# Patient Record
Sex: MALE | Race: WHITE | NOT HISPANIC OR LATINO | Employment: FULL TIME | URBAN - METROPOLITAN AREA
[De-identification: names, ages, dates, MRNs, and addresses within clinical notes are randomized per-mention and may not be internally consistent; named-entity substitution may affect disease eponyms.]

---

## 2017-01-12 ENCOUNTER — MYC REFILL (OUTPATIENT)
Dept: FAMILY MEDICINE | Facility: CLINIC | Age: 57
End: 2017-01-12

## 2017-01-12 DIAGNOSIS — I10 BENIGN ESSENTIAL HYPERTENSION: ICD-10-CM

## 2017-01-12 DIAGNOSIS — E78.5 HYPERLIPIDEMIA LDL GOAL <160: ICD-10-CM

## 2017-01-12 RX ORDER — METOPROLOL SUCCINATE 25 MG/1
25 TABLET, EXTENDED RELEASE ORAL DAILY
Qty: 90 TABLET | Refills: 1 | Status: SHIPPED | OUTPATIENT
Start: 2017-01-12 | End: 2017-04-19

## 2017-01-12 RX ORDER — SIMVASTATIN 40 MG
40 TABLET ORAL AT BEDTIME
Qty: 90 TABLET | Refills: 1 | Status: SHIPPED | OUTPATIENT
Start: 2017-01-12 | End: 2017-04-19

## 2017-01-12 NOTE — TELEPHONE ENCOUNTER
We talked with patient's spouse, she too uses same pharmacy and needed and Rx so we verified pharmacy.   Rx's sent. Pt will scheduled fasting OV on or shortly after after 3/3/17.    Óscar Otto RN

## 2017-01-12 NOTE — TELEPHONE ENCOUNTER
Message from MyChart:  Original authorizing provider: MD Kvein Rolon would like a refill of the following medications:  metoprolol (TOPROL-XL) 25 MG 24 hr tablet [Jody Blackburn MD]  simvastatin (ZOCOR) 40 MG tablet [Jody Blackburn MD]    Preferred pharmacy: Other - [facility] [place] [address]    Comment:  Please send renewal notices to my new rx drug company that started 01- for both medications. [facility] RX BIN: [id number] RX PCN: [facility]. [address] [place] [address] Thank you, Kevin Simeon

## 2017-04-19 ENCOUNTER — OFFICE VISIT (OUTPATIENT)
Dept: FAMILY MEDICINE | Facility: CLINIC | Age: 57
End: 2017-04-19
Payer: COMMERCIAL

## 2017-04-19 VITALS
SYSTOLIC BLOOD PRESSURE: 144 MMHG | HEIGHT: 71 IN | DIASTOLIC BLOOD PRESSURE: 84 MMHG | OXYGEN SATURATION: 96 % | BODY MASS INDEX: 25.76 KG/M2 | RESPIRATION RATE: 20 BRPM | TEMPERATURE: 97.4 F | HEART RATE: 90 BPM | WEIGHT: 184 LBS

## 2017-04-19 DIAGNOSIS — I10 BENIGN ESSENTIAL HYPERTENSION: ICD-10-CM

## 2017-04-19 DIAGNOSIS — E78.5 HYPERLIPIDEMIA LDL GOAL <160: ICD-10-CM

## 2017-04-19 DIAGNOSIS — M48.10 DISH (DIFFUSE IDIOPATHIC SKELETAL HYPEROSTOSIS): Primary | ICD-10-CM

## 2017-04-19 PROCEDURE — 99214 OFFICE O/P EST MOD 30 MIN: CPT | Performed by: FAMILY MEDICINE

## 2017-04-19 RX ORDER — METOPROLOL SUCCINATE 50 MG/1
50 TABLET, EXTENDED RELEASE ORAL DAILY
Qty: 90 TABLET | Refills: 1 | Status: SHIPPED | OUTPATIENT
Start: 2017-04-19 | End: 2017-06-06

## 2017-04-19 RX ORDER — SIMVASTATIN 40 MG
40 TABLET ORAL AT BEDTIME
Qty: 90 TABLET | Refills: 3 | Status: SHIPPED | OUTPATIENT
Start: 2017-04-19 | End: 2017-07-16

## 2017-04-19 NOTE — MR AVS SNAPSHOT
"              After Visit Summary   4/19/2017    Kevin Simeon    MRN: [id number]           Patient Information     Date Of Birth          1960        Visit Information        Provider Department      4/19/2017 8:00 AM Jody Blackburn MD [facility]        Today's Diagnoses     DISH (diffuse idiopathic skeletal hyperostosis)    -  1    Benign essential hypertension        Hyperlipidemia LDL goal <160           Follow-ups after your visit        Who to contact     If you have questions or need follow up information about today's clinic visit or your schedule please contact [facility] directly at [phone].  Normal or non-critical lab and imaging results will be communicated to you by [employer]hart, letter or phone within 4 business days after the clinic has received the results. If you do not hear from us within 7 days, please contact the clinic through [employer]hart or phone. If you have a critical or abnormal lab result, we will notify you by phone as soon as possible.  Submit refill requests through [employer] or call your pharmacy and they will forward the refill request to us. Please allow 3 business days for your refill to be completed.          Additional Information About Your Visit        MyChart Information     [employer] gives you secure access to your electronic health record. If you see a primary care provider, you can also send messages to your care team and make appointments. If you have questions, please call your primary care clinic.  If you do not have a primary care provider, please call [phone] and they will assist you.        Care EveryWhere ID     This is your Care EveryWhere ID. This could be used by other organizations to access your [place] medical records  [id number]        Your Vitals Were     Pulse Temperature Respirations Height Pulse Oximetry BMI (Body Mass Index)    90 97.4  F (36.3  C) (Oral) 20 5' 11\" (1.803 m) 96% 25.66 kg/m2       Blood Pressure from Last " 3 Encounters:   04/19/17 144/84   10/10/16 138/78   09/26/16 (!) 138/95    Weight from Last 3 Encounters:   04/19/17 184 lb (83.5 kg)   09/26/16 177 lb 3.2 oz (80.4 kg)   09/02/16 179 lb (81.2 kg)              Today, you had the following     No orders found for display         Today's Medication Changes          These changes are accurate as of: 4/19/17  8:33 AM.  If you have any questions, ask your nurse or doctor.               These medicines have changed or have updated prescriptions.        Dose/Directions    metoprolol 50 MG 24 hr tablet   Commonly known as:  TOPROL-XL   This may have changed:    - medication strength  - how much to take   Used for:  Benign essential hypertension   Changed by:  Jody Blackburn MD        Dose:  50 mg   Take 1 tablet (50 mg) by mouth daily   Quantity:  90 tablet   Refills:  1            Where to get your medicines      These medications were sent to [facility] [address] [place] [address] [address] AT [place] [address] & [facility]  [address], [facility] [address]     Phone:  [phone]     metoprolol 50 MG 24 hr tablet    simvastatin 40 MG tablet                Primary Care Provider Office Phone # Fax #    Jody Blackburn [phone]       [address] [address]        Thank you!     Thank you for choosing [facility]  for your care. Our goal is always to provide you with excellent care. Hearing back from our patients is one way we can continue to improve our services. Please take a few minutes to complete the written survey that you may receive in the mail after your visit with us. Thank you!             Your Updated Medication List - Protect others around you: Learn how to safely use, store and throw away your medicines at www.disposemymeds.org.          This list is accurate as of: 4/19/17  8:33 AM.  Always use your most recent med list.                   Brand Name Dispense  Instructions for use    metoprolol 50 MG 24 hr tablet    TOPROL-XL    90 tablet    Take 1 tablet (50 mg) by mouth daily       simvastatin 40 MG tablet    ZOCOR    90 tablet    Take 1 tablet (40 mg) by mouth At Bedtime

## 2017-04-19 NOTE — PROGRESS NOTES
SUBJECTIVE:                                                    Kevin Simeon is a 56 year old male who presents to clinic today for the following health issues:      Hyperlipidemia Follow-Up      Rate your low fat/cholesterol diet?: fair    Taking statin?  Yes, no muscle aches from statin    Other lipid medications/supplements?:  none     Hypertension Follow-up      Outpatient blood pressures are not being checked.    Low Salt Diet: low salt       Amount of exercise or physical activity: 6-7 days/week for an average of 45-60 minutes    Problems taking medications regularly: No    Medication side effects: none    Diet: regular (no restrictions)      He still complaining of back pain mainly in the Rt side of the mid back. Hx of chronic back pain. Aggravated by certain activities like golfing.    Problem list and histories reviewed & adjusted, as indicated.  Additional history: as documented    Patient Active Problem List   Diagnosis     Bloody diarrhea     CARDIOVASCULAR SCREENING; LDL GOAL LESS THAN 160     Swelling of joint     Inflammatory arthritis     Campylobacter diarrhea     Joint swelling     DISH (diffuse idiopathic skeletal hyperostosis)     Seborrheic keratosis     Benign essential hypertension     No past surgical history on file.    Social History   Substance Use Topics     Smoking status: Never Smoker     Smokeless tobacco: Never Used     Alcohol use 0.0 oz/week     0 Standard drinks or equivalent per week      Comment: 15 beers per week     Family History   Problem Relation Age of Onset     HEART DISEASE Mother      Hypertension Brother          Current Outpatient Prescriptions   Medication Sig Dispense Refill     metoprolol (TOPROL-XL) 50 MG 24 hr tablet Take 1 tablet (50 mg) by mouth daily 90 tablet 1     simvastatin (ZOCOR) 40 MG tablet Take 1 tablet (40 mg) by mouth At Bedtime 90 tablet 3     [DISCONTINUED] metoprolol (TOPROL-XL) 25 MG 24 hr tablet Take 1 tablet (25 mg) by mouth daily 90 tablet 1  "    [DISCONTINUED] simvastatin (ZOCOR) 40 MG tablet Take 1 tablet (40 mg) by mouth At Bedtime 90 tablet 1     Allergies   Allergen Reactions     Penicillins        Reviewed and updated as needed this visit by clinical staff  Tobacco  Allergies  Fam Hx  Soc Hx      Reviewed and updated as needed this visit by Provider         ROS:  C: NEGATIVE for fever, chills, change in weight  R: NEGATIVE for significant cough or SOB  CV: NEGATIVE for chest pain, palpitations or peripheral edema    OBJECTIVE:                                                    /84 (BP Location: Right arm, Patient Position: Chair, Cuff Size: Adult Large)  Pulse 90  Temp 97.4  F (36.3  C) (Oral)  Resp 20  Ht 5' 11\" (1.803 m)  Wt 184 lb (83.5 kg)  SpO2 96%  BMI 25.66 kg/m2  Body mass index is 25.66 kg/(m^2).  GENERAL: healthy, alert and no distress  NECK: no adenopathy, no asymmetry, masses, or scars and thyroid normal to palpation  RESP: lungs clear to auscultation - no rales, rhonchi or wheezes  CV: regular rate and rhythm, normal S1 S2, no S3 or S4, no murmur, click or rub, no peripheral edema and peripheral pulses strong  ABDOMEN: soft, nontender, no hepatosplenomegaly, no masses and bowel sounds normal  MS: no gross musculoskeletal defects noted, no edema         ASSESSMENT/PLAN:                                                            1. Benign essential hypertension  Increase dose to  - metoprolol (TOPROL-XL) 50 MG 24 hr tablet; Take 1 tablet (50 mg) by mouth daily  Dispense: 90 tablet; Refill: 1  Recheck BP in 1 week with nurse only.    2. Hyperlipidemia LDL goal <160  continue  - simvastatin (ZOCOR) 40 MG tablet; Take 1 tablet (40 mg) by mouth At Bedtime  Dispense: 90 tablet; Refill: 3    3. DISH (diffuse idiopathic skeletal hyperostosis)  Pt is still having back pain occasionally, mainly after exercise.        Supportive treatment with tylenol and NSAID's/ massage     Jody Blackburn MD  [facility]"

## 2017-04-19 NOTE — NURSING NOTE
"Chief Complaint   Patient presents with     Recheck Medication     fasting labs       Initial /84 (BP Location: Right arm, Patient Position: Chair, Cuff Size: Adult Large)  Pulse 90  Temp 97.4  F (36.3  C) (Oral)  Resp 20  Ht 5' 11\" (1.803 m)  Wt 184 lb (83.5 kg)  SpO2 96%  BMI 25.66 kg/m2 Estimated body mass index is 25.66 kg/(m^2) as calculated from the following:    Height as of this encounter: 5' 11\" (1.803 m).    Weight as of this encounter: 184 lb (83.5 kg).  Medication Reconciliation: complete   Radha Ortiz, LIYAH      "

## 2017-04-25 PROBLEM — E78.5 HYPERLIPIDEMIA LDL GOAL <130: Status: ACTIVE | Noted: 2017-04-25

## 2017-04-26 ENCOUNTER — ALLIED HEALTH/NURSE VISIT (OUTPATIENT)
Dept: NURSING | Facility: CLINIC | Age: 57
End: 2017-04-26
Payer: COMMERCIAL

## 2017-04-26 VITALS — DIASTOLIC BLOOD PRESSURE: 98 MMHG | SYSTOLIC BLOOD PRESSURE: 160 MMHG

## 2017-04-26 DIAGNOSIS — I10 BENIGN ESSENTIAL HYPERTENSION: Primary | ICD-10-CM

## 2017-04-26 PROCEDURE — 99207 ZZC NO CHARGE NURSE ONLY: CPT

## 2017-04-26 NOTE — MR AVS SNAPSHOT
After Visit Summary   4/26/2017    Kevin Simeon    MRN: [id number]           Patient Information     Date Of Birth          1960        Visit Information        Provider Department      4/26/2017 8:00 AM CR SILVER MA/LPN [facility]        Today's Diagnoses     Benign essential hypertension    -  1       Follow-ups after your visit        Your next 10 appointments already scheduled     Apr 29, 2017  8:30 AM CDT   SHORT with Jody Blackburn MD   [facility]    [address] [place] [address] [phone]              Who to contact     If you have questions or need follow up information about today's clinic visit or your schedule please contact [facility] directly at [phone].  Normal or non-critical lab and imaging results will be communicated to you by MyChart, letter or phone within 4 business days after the clinic has received the results. If you do not hear from us within 7 days, please contact the clinic through MyChart or phone. If you have a critical or abnormal lab result, we will notify you by phone as soon as possible.  Submit refill requests through [employer] or call your pharmacy and they will forward the refill request to us. Please allow 3 business days for your refill to be completed.          Additional Information About Your Visit        MyChart Information     [employer] gives you secure access to your electronic health record. If you see a primary care provider, you can also send messages to your care team and make appointments. If you have questions, please call your primary care clinic.  If you do not have a primary care provider, please call [phone] and they will assist you.        Care EveryWhere ID     This is your Care EveryWhere ID. This could be used by other organizations to access your [place] medical records  [id number]         Blood Pressure from Last 3  Encounters:   04/26/17 (!) 160/98   04/19/17 144/84   10/10/16 138/78    Weight from Last 3 Encounters:   04/19/17 184 lb (83.5 kg)   09/26/16 177 lb 3.2 oz (80.4 kg)   09/02/16 179 lb (81.2 kg)              Today, you had the following     No orders found for display       Primary Care Provider Office Phone # Fax #    Jody Blackburn [phone]       [facility] [address] [facility] [address]        Thank you!     Thank you for choosing [facility]  for your care. Our goal is always to provide you with excellent care. Hearing back from our patients is one way we can continue to improve our services. Please take a few minutes to complete the written survey that you may receive in the mail after your visit with us. Thank you!             Your Updated Medication List - Protect others around you: Learn how to safely use, store and throw away your medicines at www.disposemymeds.org.          This list is accurate as of: 4/26/17  9:01 AM.  Always use your most recent med list.                   Brand Name Dispense Instructions for use    metoprolol 50 MG 24 hr tablet    TOPROL-XL    90 tablet    Take 1 tablet (50 mg) by mouth daily       simvastatin 40 MG tablet    ZOCOR    90 tablet    Take 1 tablet (40 mg) by mouth At Bedtime

## 2017-04-26 NOTE — NURSING NOTE
Per Dr. Blackburn, continue current medication. No change. GI likely viral.  Follow up in one week for nurse only BP check.   Pt informed. He prefers to see PCP. Diarrhea every night a few hours after taking metoprolol. Pt believes this side effect of medication.   Scheduled OV 4/29/17.   Message handled by Nurse Triage with [employer] - provider name: Jody Blackburn MD.  Óscar Otto RN

## 2017-04-26 NOTE — PROGRESS NOTES
"Chief Complaint   Patient presents with     Allied Health Visit     bp       Initial BP (!) 160/98 Estimated body mass index is 25.66 kg/(m^2) as calculated from the following:    Height as of 4/19/17: 5' 11\" (1.803 m).    Weight as of 4/19/17: 184 lb (83.5 kg).  Medication Reconciliation: unable or not appropriate to perform      Kevin Simeon is a 56 year old male who comes in today for a Blood Pressure check because of medication change.    *Document pulse and BP  *Use new set of vitals button for multiple readings.  *Use extended vitals for orthostatic    Vitals as recorded, a regular cuff was used.    Patient is taking medication as prescribed  Patient is not tolerating medications well.  Patient is not monitoring Blood Pressure at home.  Average readings if yes are     Current complaints: diarrhea    Disposition: MD/AP notified while patient in the clinic      Shari Schoenberger, CMA    "

## 2017-04-28 NOTE — PROGRESS NOTES
SUBJECTIVE:                                                    Kevin Simeon is a 56 year old male who presents to clinic today for the following health issues:      Hypertension Follow-up      Outpatient blood pressures are not being checked.    Low Salt Diet: low salt       Amount of exercise or physical activity: 6-7 days/week for an average of 45-60 minutes    Problems taking medications regularly: No    Medication side effects: none    Diet: regular (no restrictions)      When he increased the dose to 50 mg, he had diarrhea, multiple nights, last night his symptoms got better.   Denies any fever, admits to gas .    Problem list and histories reviewed & adjusted, as indicated.  Additional history: as documented    Patient Active Problem List   Diagnosis     Bloody diarrhea     CARDIOVASCULAR SCREENING; LDL GOAL LESS THAN 160     Swelling of joint     Inflammatory arthritis     Campylobacter diarrhea     Joint swelling     DISH (diffuse idiopathic skeletal hyperostosis)     Seborrheic keratosis     Benign essential hypertension     Hyperlipidemia LDL goal <130     No past surgical history on file.    Social History   Substance Use Topics     Smoking status: Never Smoker     Smokeless tobacco: Never Used     Alcohol use 0.0 oz/week     0 Standard drinks or equivalent per week      Comment: 15 beers per week     Family History   Problem Relation Age of Onset     HEART DISEASE Mother      Hypertension Brother          Current Outpatient Prescriptions   Medication Sig Dispense Refill     metoprolol (TOPROL-XL) 50 MG 24 hr tablet Take 1 tablet (50 mg) by mouth daily 90 tablet 1     simvastatin (ZOCOR) 40 MG tablet Take 1 tablet (40 mg) by mouth At Bedtime 90 tablet 3     Allergies   Allergen Reactions     Penicillins        Reviewed and updated as needed this visit by clinical staff  Tobacco  Allergies  Fam Hx       Reviewed and updated as needed this visit by Provider         ROS:  C: NEGATIVE for fever, chills,  "change in weight  R: NEGATIVE for significant cough or SOB  CV: NEGATIVE for chest pain, palpitations or peripheral edema    OBJECTIVE:                                                    /74 (BP Location: Right arm, Patient Position: Chair, Cuff Size: Adult Large)  Pulse 89  Temp 97.8  F (36.6  C) (Oral)  Resp 20  Ht 5' 11\" (1.803 m)  Wt 181 lb (82.1 kg)  SpO2 98%  BMI 25.24 kg/m2  Body mass index is 25.24 kg/(m^2).  GENERAL: healthy, alert and no distress  NECK: no adenopathy, no asymmetry, masses, or scars and thyroid normal to palpation  RESP: lungs clear to auscultation - no rales, rhonchi or wheezes  CV: regular rate and rhythm, normal S1 S2, no S3 or S4, no murmur, click or rub, no peripheral edema and peripheral pulses strong  ABDOMEN: soft, nontender, no hepatosplenomegaly, no masses and bowel sounds normal  MS: no gross musculoskeletal defects noted, no edema         ASSESSMENT/PLAN:                                                            1. Benign essential hypertension  Controlled, continue on same dose.     2. Diarrhea, unspecified type  Mostly related to medication, symptoms has resolved, will continue monitoroing, Follow up in 5 days if symptoms persist, sooner if symptoms worsen or new ones develops, pt may contact us over the phone for any questions or concerns.            Jody Blackburn MD  [facility]"

## 2017-04-29 ENCOUNTER — OFFICE VISIT (OUTPATIENT)
Dept: FAMILY MEDICINE | Facility: CLINIC | Age: 57
End: 2017-04-29
Payer: COMMERCIAL

## 2017-04-29 VITALS
TEMPERATURE: 97.8 F | HEART RATE: 89 BPM | HEIGHT: 71 IN | DIASTOLIC BLOOD PRESSURE: 74 MMHG | WEIGHT: 181 LBS | OXYGEN SATURATION: 98 % | RESPIRATION RATE: 20 BRPM | BODY MASS INDEX: 25.34 KG/M2 | SYSTOLIC BLOOD PRESSURE: 124 MMHG

## 2017-04-29 DIAGNOSIS — R19.7 DIARRHEA, UNSPECIFIED TYPE: ICD-10-CM

## 2017-04-29 DIAGNOSIS — I10 BENIGN ESSENTIAL HYPERTENSION: Primary | ICD-10-CM

## 2017-04-29 PROCEDURE — 99214 OFFICE O/P EST MOD 30 MIN: CPT | Performed by: FAMILY MEDICINE

## 2017-04-29 NOTE — MR AVS SNAPSHOT
"              After Visit Summary   4/29/2017    Kevin Simeon    MRN: [id number]           Patient Information     Date Of Birth          1960        Visit Information        Provider Department      4/29/2017 8:30 AM Jody Blackburn MD [facility]        Today's Diagnoses     Benign essential hypertension    -  1    Diarrhea, unspecified type           Follow-ups after your visit        Who to contact     If you have questions or need follow up information about today's clinic visit or your schedule please contact [facility] directly at [phone].  Normal or non-critical lab and imaging results will be communicated to you by [employer], letter or phone within 4 business days after the clinic has received the results. If you do not hear from us within 7 days, please contact the clinic through [employer] or phone. If you have a critical or abnormal lab result, we will notify you by phone as soon as possible.  Submit refill requests through [employer] or call your pharmacy and they will forward the refill request to us. Please allow 3 business days for your refill to be completed.          Additional Information About Your Visit        MyChart Information     [employer] gives you secure access to your electronic health record. If you see a primary care provider, you can also send messages to your care team and make appointments. If you have questions, please call your primary care clinic.  If you do not have a primary care provider, please call [phone] and they will assist you.        Care EveryWhere ID     This is your Care EveryWhere ID. This could be used by other organizations to access your [place] medical records  [id number]        Your Vitals Were     Pulse Temperature Respirations Height Pulse Oximetry BMI (Body Mass Index)    89 97.8  F (36.6  C) (Oral) 20 5' 11\" (1.803 m) 98% 25.24 kg/m2       Blood Pressure from Last 3 Encounters:   04/29/17 124/74   04/26/17 (!) 160/98 "   04/19/17 144/84    Weight from Last 3 Encounters:   04/29/17 181 lb (82.1 kg)   04/19/17 184 lb (83.5 kg)   09/26/16 177 lb 3.2 oz (80.4 kg)              Today, you had the following     No orders found for display       Primary Care Provider Office Phone # Fax #    Jody Blackburn [phone]       [facility] [id number] [place] [id number]        Thank you!     Thank you for choosing [facility]  for your care. Our goal is always to provide you with excellent care. Hearing back from our patients is one way we can continue to improve our services. Please take a few minutes to complete the written survey that you may receive in the mail after your visit with us. Thank you!             Your Updated Medication List - Protect others around you: Learn how to safely use, store and throw away your medicines at www.disposemymeds.org.          This list is accurate as of: 4/29/17  8:56 AM.  Always use your most recent med list.                   Brand Name Dispense Instructions for use    metoprolol 50 MG 24 hr tablet    TOPROL-XL    90 tablet    Take 1 tablet (50 mg) by mouth daily       simvastatin 40 MG tablet    ZOCOR    90 tablet    Take 1 tablet (40 mg) by mouth At Bedtime

## 2017-04-29 NOTE — NURSING NOTE
"Chief Complaint   Patient presents with     Hypertension       Initial /74 (BP Location: Right arm, Patient Position: Chair, Cuff Size: Adult Large)  Pulse 89  Temp 97.8  F (36.6  C) (Oral)  Resp 20  Ht 5' 11\" (1.803 m)  Wt 181 lb (82.1 kg)  SpO2 98%  BMI 25.24 kg/m2 Estimated body mass index is 25.24 kg/(m^2) as calculated from the following:    Height as of this encounter: 5' 11\" (1.803 m).    Weight as of this encounter: 181 lb (82.1 kg).  Medication Reconciliation: complete   Radha Ortiz, LIYAH      "

## 2017-06-06 DIAGNOSIS — I10 BENIGN ESSENTIAL HYPERTENSION: ICD-10-CM

## 2017-06-06 NOTE — TELEPHONE ENCOUNTER
metoprolol (TOPROL-XL) 50 MG 24 hr tablet     Last Written Prescription Date: 04/19/17  Last Fill Quantity: 90, # refills: 0    Last Office Visit with [facility], [facility] or [facility] prescribing provider:  04/29/17     Future Office Visit:        BP Readings from Last 3 Encounters:   04/29/17 124/74   04/26/17 (!) 160/98   04/19/17 144/84

## 2017-06-09 RX ORDER — METOPROLOL SUCCINATE 50 MG/1
50 TABLET, EXTENDED RELEASE ORAL DAILY
Qty: 90 TABLET | Refills: 2 | Status: SHIPPED | OUTPATIENT
Start: 2017-06-09 | End: 2017-11-15

## 2017-06-09 NOTE — TELEPHONE ENCOUNTER
Prescription approved per [facility] Refill Protocol.   We called Kevin to verify this mail order pharmacy is correct, this writer not familiar with pharmacy.   Verified.   Óscar Otto RN

## 2017-07-16 ENCOUNTER — MYC REFILL (OUTPATIENT)
Dept: FAMILY MEDICINE | Facility: CLINIC | Age: 57
End: 2017-07-16

## 2017-07-16 DIAGNOSIS — E78.5 HYPERLIPIDEMIA LDL GOAL <160: ICD-10-CM

## 2017-07-17 RX ORDER — SIMVASTATIN 40 MG
40 TABLET ORAL AT BEDTIME
Qty: 90 TABLET | Refills: 2 | Status: SHIPPED | OUTPATIENT
Start: 2017-07-17 | End: 2018-03-29

## 2017-07-17 NOTE — TELEPHONE ENCOUNTER
Message from [employer]:  Original authorizing provider: MD Kevin Rolon would like a refill of the following medications:  simvastatin (ZOCOR) 40 MG tablet [Jody Blackburn MD]    [facility] [address]    Comment:

## 2017-09-14 ENCOUNTER — OFFICE VISIT (OUTPATIENT)
Dept: FAMILY MEDICINE | Facility: CLINIC | Age: 57
End: 2017-09-14
Payer: COMMERCIAL

## 2017-09-14 VITALS
SYSTOLIC BLOOD PRESSURE: 138 MMHG | DIASTOLIC BLOOD PRESSURE: 86 MMHG | RESPIRATION RATE: 12 BRPM | WEIGHT: 183 LBS | HEART RATE: 104 BPM | BODY MASS INDEX: 25.52 KG/M2 | TEMPERATURE: 98.4 F

## 2017-09-14 DIAGNOSIS — Z23 NEED FOR PROPHYLACTIC VACCINATION AND INOCULATION AGAINST INFLUENZA: ICD-10-CM

## 2017-09-14 DIAGNOSIS — M25.512 ACUTE PAIN OF LEFT SHOULDER: Primary | ICD-10-CM

## 2017-09-14 PROCEDURE — 90686 IIV4 VACC NO PRSV 0.5 ML IM: CPT | Performed by: FAMILY MEDICINE

## 2017-09-14 PROCEDURE — 90471 IMMUNIZATION ADMIN: CPT | Performed by: FAMILY MEDICINE

## 2017-09-14 PROCEDURE — 99214 OFFICE O/P EST MOD 30 MIN: CPT | Mod: 25 | Performed by: FAMILY MEDICINE

## 2017-09-14 PROCEDURE — 93000 ELECTROCARDIOGRAM COMPLETE: CPT | Performed by: FAMILY MEDICINE

## 2017-09-14 NOTE — MR AVS SNAPSHOT
After Visit Summary   9/14/2017    Kevin Simeon    MRN: [id number]           Patient Information     Date Of Birth          1960        Visit Information        Provider Department      9/14/2017 11:30 AM Jody Blackburn MD [facility]        Today's Diagnoses     Acute pain of left shoulder    -  1       Follow-ups after your visit        Future tests that were ordered for you today     Open Future Orders        Priority Expected Expires Ordered    Exercise Stress Echocardiogram Routine  9/14/2018 9/14/2017            Who to contact     If you have questions or need follow up information about today's clinic visit or your schedule please contact [facility] directly at [phone].  Normal or non-critical lab and imaging results will be communicated to you by MyChart, letter or phone within 4 business days after the clinic has received the results. If you do not hear from us within 7 days, please contact the clinic through [employer] or phone. If you have a critical or abnormal lab result, we will notify you by phone as soon as possible.  Submit refill requests through [employer] or call your pharmacy and they will forward the refill request to us. Please allow 3 business days for your refill to be completed.          Additional Information About Your Visit        MyChart Information     [employer] gives you secure access to your electronic health record. If you see a primary care provider, you can also send messages to your care team and make appointments. If you have questions, please call your primary care clinic.  If you do not have a primary care provider, please call [phone] and they will assist you.        Care EveryWhere ID     This is your Care EveryWhere ID. This could be used by other organizations to access your [facility] medical records  [id number]        Your Vitals Were     Pulse Temperature Respirations BMI (Body Mass Index)          104 98.4  F (36.9   C) 12 25.52 kg/m2         Blood Pressure from Last 3 Encounters:   09/14/17 138/86   04/29/17 124/74   04/26/17 (!) 160/98    Weight from Last 3 Encounters:   09/14/17 183 lb (83 kg)   04/29/17 181 lb (82.1 kg)   04/19/17 184 lb (83.5 kg)              We Performed the Following     EKG 12-lead complete w/read - Clinics        Primary Care Provider Office Phone # Fax #    Jody Blackburn [phone] [address] [facility] [address]        Equal Access to Services     [facility]: Hadii aad faina hadasho [employer], waaxda luqadaha, qaybta kaalmada adeabelyada, carlos persaud . So [facility] [phone].    ATENCIÓN: Si habla español, tiene a evans disposición servicios gratuitos de asistencia lingüística. Llame al [phone].    We comply with applicable federal civil rights laws and [place] laws. We do not discriminate on the basis of race, color, national origin, age, disability sex, sexual orientation or gender identity.            Thank you!     Thank you for choosing [facility]  for your care. Our goal is always to provide you with excellent care. Hearing back from our patients is one way we can continue to improve our services. Please take a few minutes to complete the written survey that you may receive in the mail after your visit with us. Thank you!             Your Updated Medication List - Protect others around you: Learn how to safely use, store and throw away your medicines at www.disposemymeds.org.          This list is accurate as of: 9/14/17 12:18 PM.  Always use your most recent med list.                   Brand Name Dispense Instructions for use Diagnosis    metoprolol 50 MG 24 hr tablet    TOPROL-XL    90 tablet    Take 1 tablet (50 mg) by mouth daily    Benign essential hypertension       simvastatin 40 MG tablet    ZOCOR    90 tablet    Take 1 tablet (40 mg) by mouth At Bedtime    Hyperlipidemia LDL goal <160

## 2017-09-14 NOTE — PROGRESS NOTES
Injectable Influenza Immunization Documentation    1.  Are you sick today? (Fever of 100.5 or higher on the day of the clinic)   No    2.  Have you ever had Guillain-Marion Syndrome within 6 weeks of an influenza vaccionation?  No    3. Do you have a life-threatening allergy to eggs?  No    4. Do you have a life-threatening allergy to a component of the vaccine? May include antibiotics, gelatin or latex.  No     5. Have you ever had a reaction to a dose of flu vaccine that needed immediate medical attention?  No     Form completed by Darci Stevens MA

## 2017-09-14 NOTE — NURSING NOTE
"Chief Complaint   Patient presents with     Shoulder Pain     Left shoulder pain X 7 days, Right shoulder pain as well, but not as bad as left shoulder       Initial /86 (BP Location: Right arm, Patient Position: Chair, Cuff Size: Adult Regular)  Pulse 104  Temp 98.4  F (36.9  C)  Resp 12  Wt 183 lb (83 kg)  BMI 25.52 kg/m2 Estimated body mass index is 25.52 kg/(m^2) as calculated from the following:    Height as of 4/29/17: 5' 11\" (1.803 m).    Weight as of this encounter: 183 lb (83 kg).  Medication Reconciliation: complete   Darci Stevens MA      "

## 2017-09-14 NOTE — PROGRESS NOTES
SUBJECTIVE:   Kevin Simeon is a 56 year old male who presents to clinic today for the following health issues:      Joint Pain    Onset: X 7 days    Description:   Location: left shoulder primarily, but Right shoulder as well  Character: Burning and nagging     Intensity: moderate, severe    Progression of Symptoms: same    Accompanying Signs & Symptoms:  Other symptoms: Left sided cervical pain    History:   Previous similar pain: no       Precipitating factors:   Trauma or overuse: YES- has injured shoulders bilaterally at work for repetitive motion, injured Left shoulder favoring his Right shoulder    Alleviating factors:  Improved by: ice, massage and Ibuprofen    Therapies Tried and outcome: rest seems to help              Problem list and histories reviewed & adjusted, as indicated.  Additional history: as documented    Patient Active Problem List   Diagnosis     Bloody diarrhea     CARDIOVASCULAR SCREENING; LDL GOAL LESS THAN 160     Swelling of joint     Inflammatory arthritis     Campylobacter diarrhea     Joint swelling     DISH (diffuse idiopathic skeletal hyperostosis)     Seborrheic keratosis     Benign essential hypertension     Hyperlipidemia LDL goal <130     History reviewed. No pertinent surgical history.    Social History   Substance Use Topics     Smoking status: Never Smoker     Smokeless tobacco: Never Used     Alcohol use 0.0 oz/week     0 Standard drinks or equivalent per week      Comment: 15 beers per week     Family History   Problem Relation Age of Onset     HEART DISEASE Mother      Hypertension Brother          Current Outpatient Prescriptions   Medication Sig Dispense Refill     simvastatin (ZOCOR) 40 MG tablet Take 1 tablet (40 mg) by mouth At Bedtime 90 tablet 2     metoprolol (TOPROL-XL) 50 MG 24 hr tablet Take 1 tablet (50 mg) by mouth daily 90 tablet 2     Allergies   Allergen Reactions     Penicillins          Reviewed and updated as needed this visit by clinical staffTobacco   Allergies  Med Hx  Surg Hx  Fam Hx  Soc Hx      Reviewed and updated as needed this visit by Provider         ROS:  C: NEGATIVE for fever, chills, change in weight   R: NEGATIVE for significant cough or SOB  CV: NEGATIVE for chest pain, palpitations or peripheral edema    OBJECTIVE:     /86 (BP Location: Right arm, Patient Position: Chair, Cuff Size: Adult Regular)  Pulse 104  Temp 98.4  F (36.9  C)  Resp 12  Wt 183 lb (83 kg)  BMI 25.52 kg/m2  Body mass index is 25.52 kg/(m^2).  GENERAL: healthy, alert and no distress  RESP: lungs clear to auscultation - no rales, rhonchi or wheezes  CV: regular rate and rhythm, normal S1 S2, no S3 or S4, no murmur, click or rub, no peripheral edema and peripheral pulses strong  MS: no gross musculoskeletal defects noted, no edema  Shoulder exam:inspection: normal.  Palpation : mild anterior joint tenderness.  ROM showed :Abduction:nl  Anterior rotation:nl  Internal rotation:nl   Rotator cuff/supraspinatous strength normal  Drop arm sign negative  Impingement test : Rony Crawford-  Instability test : sulcus sign (more than Half inch) -, apprehension,release sign :-        ASSESSMENT/PLAN:             1. Acute pain of left shoulder  Mostly musculoskeletal, pt is very worried about heart disease causing these symptoms, although unlikely, will check for EKG and stress echo  - EKG 12-lead complete w/read - Clinics  - Exercise Stress Echocardiogram; Future  Advised about rest, heat, and Motrin as needed, Follow up in 14 days if symptoms persist, sooner if symptoms worsen or new ones develops, pt may contact us over the phone for any questions or concerns.      2. Need for prophylactic vaccination and inoculation against influenza    - FLU VAC, SPLIT VIRUS IM > 3 YO (QUADRIVALENT) [87533]  - Vaccine Administration, Initial [33995]        Jody Blackburn MD  [facility]

## 2017-09-26 ENCOUNTER — TELEPHONE (OUTPATIENT)
Dept: FAMILY MEDICINE | Facility: CLINIC | Age: 57
End: 2017-09-26

## 2017-09-26 ENCOUNTER — HOSPITAL ENCOUNTER (OUTPATIENT)
Dept: CARDIOLOGY | Facility: CLINIC | Age: 57
Discharge: HOME | End: 2017-09-26
Attending: FAMILY MEDICINE | Admitting: FAMILY MEDICINE
Payer: COMMERCIAL

## 2017-09-26 DIAGNOSIS — M25.512 ACUTE PAIN OF LEFT SHOULDER: ICD-10-CM

## 2017-09-26 DIAGNOSIS — R94.39 ABNORMAL CARDIOVASCULAR STRESS TEST: Primary | ICD-10-CM

## 2017-09-26 PROCEDURE — 93321 DOPPLER ECHO F-UP/LMTD STD: CPT | Mod: 26 | Performed by: INTERNAL MEDICINE

## 2017-09-26 PROCEDURE — 93018 CV STRESS TEST I&R ONLY: CPT | Performed by: INTERNAL MEDICINE

## 2017-09-26 PROCEDURE — 93325 DOPPLER ECHO COLOR FLOW MAPG: CPT | Mod: 26 | Performed by: INTERNAL MEDICINE

## 2017-09-26 PROCEDURE — 93016 CV STRESS TEST SUPVJ ONLY: CPT | Performed by: INTERNAL MEDICINE

## 2017-09-26 PROCEDURE — 93350 STRESS TTE ONLY: CPT | Mod: TC

## 2017-09-26 PROCEDURE — 93350 STRESS TTE ONLY: CPT | Mod: 26 | Performed by: INTERNAL MEDICINE

## 2017-09-26 NOTE — TELEPHONE ENCOUNTER
Pt returned call, explained testing and referral  Will set up appt for further evaluation    Britney Delgado RN, BS  Clinical Nurse Triage.

## 2017-09-26 NOTE — TELEPHONE ENCOUNTER
Please call Kevin, there is some abnormality on the stress test done today, I do recommend that he sees Cardiology about it, I will put the order in and they should be calling him today.  Jody Blackburn MD  [facility]  [phone]

## 2017-09-29 ENCOUNTER — OFFICE VISIT (OUTPATIENT)
Dept: CARDIOLOGY | Facility: CLINIC | Age: 57
End: 2017-09-29
Attending: FAMILY MEDICINE
Payer: COMMERCIAL

## 2017-09-29 VITALS
SYSTOLIC BLOOD PRESSURE: 170 MMHG | DIASTOLIC BLOOD PRESSURE: 98 MMHG | OXYGEN SATURATION: 98 % | HEART RATE: 99 BPM | BODY MASS INDEX: 26.1 KG/M2 | HEIGHT: 71 IN | WEIGHT: 186.4 LBS

## 2017-09-29 DIAGNOSIS — I25.10 CORONARY ARTERY DISEASE INVOLVING NATIVE CORONARY ARTERY OF NATIVE HEART, ANGINA PRESENCE UNSPECIFIED: ICD-10-CM

## 2017-09-29 DIAGNOSIS — R94.39 ABNORMAL CARDIOVASCULAR STRESS TEST: Primary | ICD-10-CM

## 2017-09-29 DIAGNOSIS — I10 BENIGN ESSENTIAL HYPERTENSION: ICD-10-CM

## 2017-09-29 DIAGNOSIS — E78.2 MIXED HYPERLIPIDEMIA: ICD-10-CM

## 2017-09-29 PROCEDURE — 99204 OFFICE O/P NEW MOD 45 MIN: CPT | Performed by: INTERNAL MEDICINE

## 2017-09-29 RX ORDER — LISINOPRIL 10 MG/1
10 TABLET ORAL DAILY
Qty: 90 TABLET | Refills: 3 | Status: SHIPPED | OUTPATIENT
Start: 2017-09-29 | End: 2018-10-15

## 2017-09-29 RX ORDER — ASPIRIN 81 MG/1
81 TABLET ORAL DAILY
Qty: 1 TABLET | Refills: 0 | COMMUNITY
Start: 2017-09-29 | End: 2017-10-24

## 2017-09-29 NOTE — PROGRESS NOTES
HPI and Plan:   See dictation:[id number]    No orders of the defined types were placed in this encounter.      Orders Placed This Encounter   Medications     aspirin EC 81 MG EC tablet     Sig: Take 1 tablet (81 mg) by mouth daily     Dispense:  1 tablet     Refill:  0     lisinopril (PRINIVIL/ZESTRIL) 10 MG tablet     Sig: Take 1 tablet (10 mg) by mouth daily     Dispense:  90 tablet     Refill:  3       There are no discontinued medications.      Encounter Diagnoses   Name Primary?     Coronary artery disease involving native coronary artery of native heart, angina presence unspecified Yes     Benign essential hypertension      Abnormal cardiovascular stress test        CURRENT MEDICATIONS:  Current Outpatient Prescriptions   Medication Sig Dispense Refill     aspirin EC 81 MG EC tablet Take 1 tablet (81 mg) by mouth daily 1 tablet 0     lisinopril (PRINIVIL/ZESTRIL) 10 MG tablet Take 1 tablet (10 mg) by mouth daily 90 tablet 3     simvastatin (ZOCOR) 40 MG tablet Take 1 tablet (40 mg) by mouth At Bedtime 90 tablet 2     metoprolol (TOPROL-XL) 50 MG 24 hr tablet Take 1 tablet (50 mg) by mouth daily 90 tablet 2       ALLERGIES     Allergies   Allergen Reactions     Penicillins        PAST MEDICAL HISTORY:  Past Medical History:   Diagnosis Date     Campylobacter diarrhea 3/19/2012     DISH (diffuse idiopathic skeletal hyperostosis) 9/2/2016     Hyperlipidemia LDL goal <130 4/25/2017     Inflammatory arthritis 3/14/2012     Joint swelling 9/12/2013     Seborrheic keratosis 9/2/2016       PAST SURGICAL HISTORY:  History reviewed. No pertinent surgical history.    FAMILY HISTORY:  Family History   Problem Relation Age of Onset     HEART DISEASE Mother      Hypertension Brother        SOCIAL HISTORY:  Social History     Social History     Marital status:      Spouse name: N/A     Number of children: N/A     Years of education: N/A     Social History Main Topics     Smoking status: Never Smoker     Smokeless tobacco:  "Never Used     Alcohol use 0.0 oz/week     0 Standard drinks or equivalent per week      Comment: 15 beers per week     Drug use: No     Sexual activity: Yes     Partners: Female     Other Topics Concern     None     Social History Narrative       Review of Systems:  Skin:  Negative       Eyes:  Positive for glasses reading  ENT:  Positive for nasal congestion    Respiratory:  Negative       Cardiovascular:  Negative      Gastroenterology: Positive for abdominal pain feels pain in stomach area possibly is due to bp medication  Genitourinary:  Negative      Musculoskeletal:  Positive for joint pain;back pain;arthritis has brusitis in L shoulder  has arthritis in spine  Neurologic:  Negative      Psychiatric:  Positive for sleep disturbances wakes up early often  Heme/Lymph/Imm:  Negative      Endocrine:  Negative        Physical Exam:  Vitals: BP (!) 170/98 (BP Location: Right arm, Patient Position: Sitting, Cuff Size: Adult Regular)  Pulse 99  Ht 1.803 m (5' 11\")  Wt 84.6 kg (186 lb 6.4 oz)  SpO2 98%  BMI 26 kg/m2    Constitutional:  cooperative, alert and oriented, well developed, well nourished, in no acute distress        Skin:  warm and dry to the touch, no apparent skin lesions or masses noted        Head:  normocephalic, no masses or lesions        Eyes:  pupils equal and round, conjunctivae and lids unremarkable, sclera white, no xanthalasma, EOMS intact, no nystagmus        ENT:  no pallor or cyanosis, dentition good        Neck:  carotid pulses are full and equal bilaterally, JVP normal, no carotid bruit, no thyromegaly        Chest:  normal breath sounds, clear to auscultation, normal A-P diameter, normal symmetry, normal respiratory excursion, no use of accessory muscles          Cardiac: regular rhythm, normal S1/S2, no S3 or S4, apical impulse not displaced, no murmurs, gallops or rubs tachycardic                Abdomen:  abdomen soft, non-tender, BS normoactive, no mass, no HSM, no bruits    "     Vascular: pulses full and equal, no bruits auscultated                                        Extremities and Back:  no deformities, clubbing, cyanosis, erythema observed;no edema              Neurological:  affect appropriate, oriented to time, person and place;no gross motor deficits              CC  Jody Blackburn MD  [address] [place] [address]

## 2017-09-29 NOTE — MR AVS SNAPSHOT
After Visit Summary   9/29/2017    Kevin Simeon    MRN: [id number]           Patient Information     Date Of Birth          1960        Visit Information        Provider Department      9/29/2017 1:30 PM Noé Tejada MD [facility]        Today's Diagnoses     Abnormal cardiovascular stress test    -  1    Coronary artery disease involving native coronary artery of native heart, angina presence unspecified        Benign essential hypertension        Mixed hyperlipidemia           Follow-ups after your visit        Your next 10 appointments already scheduled     Oct 09, 2017  7:30 AM CDT   LAB with RU LAB   [facility] ([facility])    [address]  [facility] [address] [phone]           Patient must bring picture ID. Patient should be prepared to give a urine specimen  Please do not eat 10-12 hours before your appointment if you are coming in fasting for labs on lipids, cholesterol, or glucose (sugar). Pregnant women should follow their Care Team instructions. Water with medications is okay. Do not drink coffee or other fluids. If you have concerns about taking  your medications, please ask at office or if scheduling via [employer], send a message by clicking on Secure Messaging, Message Your Care Team.            Oct 10, 2017 11:30 AM CDT   (Arrive by 9:30 AM)   Cath 90 Minute with BIBI LECHUGA   [facility] ([facility])    [address]  [facility] [id number] [phone]            Oct 24, 2017 12:30 PM CDT   Return Visit with FABIAN Whitley CNP   [facility] ([facility])    [id number] [place] [address] [address]  [facility] [id number] [phone]              Future tests that were ordered for you today     Open Future Orders        Priority Expected Expires Ordered    Cardiac  "Cath: Coronary Angiography Adult Order Routine 10/6/2017 9/29/2018 9/29/2017            Who to contact     If you have questions or need follow up information about today's clinic visit or your schedule please contact [facility] [place] directly at [phone].  Normal or non-critical lab and imaging results will be communicated to you by MyChart, letter or phone within 4 business days after the clinic has received the results. If you do not hear from us within 7 days, please contact the clinic through [employer] or phone. If you have a critical or abnormal lab result, we will notify you by phone as soon as possible.  Submit refill requests through [employer] or call your pharmacy and they will forward the refill request to us. Please allow 3 business days for your refill to be completed.          Additional Information About Your Visit        MyChart Information     [employer] gives you secure access to your electronic health record. If you see a primary care provider, you can also send messages to your care team and make appointments. If you have questions, please call your primary care clinic.  If you do not have a primary care provider, please call [phone] and they will assist you.        Care EveryWhere ID     This is your Care EveryWhere ID. This could be used by other organizations to access your [facility] medical records  [id number]        Your Vitals Were     Pulse Height Pulse Oximetry BMI (Body Mass Index)          99 1.803 m (5' 11\") 98% 26 kg/m2         Blood Pressure from Last 3 Encounters:   09/29/17 (!) 170/98   09/14/17 138/86   04/29/17 124/74    Weight from Last 3 Encounters:   09/29/17 84.6 kg (186 lb 6.4 oz)   09/14/17 83 kg (183 lb)   04/29/17 82.1 kg (181 lb)                 Today's Medication Changes          These changes are accurate as of: 9/29/17  2:35 PM.  If you have any questions, ask your nurse or doctor.               Start taking these medicines.     "    Dose/Directions    lisinopril 10 MG tablet   Commonly known as:  PRINIVIL/ZESTRIL   Used for:  Benign essential hypertension   Started by:  Noé Tejada MD        Dose:  10 mg   Take 1 tablet (10 mg) by mouth daily   Quantity:  90 tablet   Refills:  3            Where to get your medicines      [facility] [place] [place] - [address]  [address], [facility] [address]    Hours:  24-hours Phone:  [phone]     lisinopril 10 MG tablet                Primary Care Provider Office Phone # Fax #    Jody Blackburn [phone] [address] [facility] [address]        Equal Access to Services     [facility] : Hadii shirin leono [employer], waaxda luqadaha, qaybta kaalmada adeegyada, carlos persaud . So [facility] [phone].    ATENCIÓN: Si habla español, tiene a evans disposición servicios gratuitos de asistencia lingüística. [facility] [phone].    We comply with applicable federal civil rights laws and [place] laws. We do not discriminate on the basis of race, color, national origin, age, disability, sex, sexual orientation, or gender identity.            Thank you!     Thank you for choosing [facility] [place]  for your care. Our goal is always to provide you with excellent care. Hearing back from our patients is one way we can continue to improve our services. Please take a few minutes to complete the written survey that you may receive in the mail after your visit with us. Thank you!             Your Updated Medication List - Protect others around you: Learn how to safely use, store and throw away your medicines at www.disposemymeds.org.          This list is accurate as of: 9/29/17  2:35 PM.  Always use your most recent med list.                   Brand Name Dispense Instructions for use Diagnosis    aspirin EC 81 MG EC tablet     1 tablet     Take 1 tablet (81 mg) by mouth daily    Coronary artery disease involving native coronary artery of native heart, angina presence unspecified       lisinopril 10 MG tablet    PRINIVIL/ZESTRIL    90 tablet    Take 1 tablet (10 mg) by mouth daily    Benign essential hypertension       metoprolol 50 MG 24 hr tablet    TOPROL-XL    90 tablet    Take 1 tablet (50 mg) by mouth daily    Benign essential hypertension       simvastatin 40 MG tablet    ZOCOR    90 tablet    Take 1 tablet (40 mg) by mouth At Bedtime    Hyperlipidemia LDL goal <160

## 2017-09-29 NOTE — LETTER
9/29/2017    Jody Blackburn MD  [address] [place] [address]    RE: Kevin Blanco       Dear Colleague,    PRIMARY CARE PHYSICIAN:  Dr. Jody Blackburn.      I had the pleasure of seeing your patient, Kevin Simeon, at [facility] for evaluation of left shoulder burning and throbbing and an abnormal stress echocardiogram.  This patient states that he has injured both shoulders while working as an .  More recently, the left arm became more tender especially to palpation.  He would have times where this was burning and throbbing and radiating down the arm.  He saw Dr. Blackburn and a stress echocardiogram was ordered.  The stress echocardiogram showed below average functional capacity with target heart rate achieved.  The baseline shoulder aching went away during exercise and he did not have chest pain.  There was abnormal stress EKG with 2 mm ST depression in the inferior and precordial lateral leads.  The resting echo showed no wall motion abnormalities and ejection fraction of 55%-60%.  There was mild lateral wall hypokinesis with exercise but ejection fraction improved to greater than 70%.  An EKG from 09/14/2017 showed heart rate of 91 beats per minute but no ST or T-wave abnormalities.  This was a normal EKG.  This patient has multiple cardiovascular risk factors including a mother with left arm pain which was diagnosed later as an anginal equivalent.  She went on to have bypass surgery at age 47 years old and eventually needed a cardiac transplant.  The patient has a 1-year history of hypertension and hyperlipidemia.  He has been placed on Toprol-XL 50 mg per day along with simvastatin 40 mg at bedtime.  His fasting lipid profile from 09/2016 showed total cholesterol 202, HDL 40,  and triglycerides 224.  The patient has never had a myocardial infarction or stroke.  He generally exercises twice a day walking anywhere from 20-25 minutes on weekdays and 45 minutes on weekends.  He is a  nonsmoker.  He drinks approximately 12-15 beers per week.      PAST MEDICAL HISTORY:  Includes surgery on a right finger with repair.  No other hospitalizations or surgery.      SOCIAL HISTORY:  The patient is  and has 6 stepchildren.  He is an .      PHYSICAL EXAMINATION:  As listed below.     Outpatient Encounter Prescriptions as of 9/29/2017   Medication Sig Dispense Refill     aspirin EC 81 MG EC tablet Take 1 tablet (81 mg) by mouth daily 1 tablet 0     lisinopril (PRINIVIL/ZESTRIL) 10 MG tablet Take 1 tablet (10 mg) by mouth daily 90 tablet 3     simvastatin (ZOCOR) 40 MG tablet Take 1 tablet (40 mg) by mouth At Bedtime 90 tablet 2     metoprolol (TOPROL-XL) 50 MG 24 hr tablet Take 1 tablet (50 mg) by mouth daily 90 tablet 2     No facility-administered encounter medications on file as of 9/29/2017.       ASSESSMENT:   1.  Kevin Simeon is a pleasant 57-year-old male with left shoulder pain at rest and with activities.  Some of this is undoubtedly musculoskeletal.  However, the patient has an abnormal stress echocardiogram with possible lateral wall ischemia.  Given these findings coronary angiography is suggested.  His blood pressure is elevated and perhaps that is adding to possible cardiac ischemia.  We will treat his blood pressure a bit more aggressively.   2.  Hypertension.  The patient's systolic and diastolic blood pressure is elevated.  I have taken the liberty of adding lisinopril 10 mg.  We can also increase his Toprol-XL dose to try to attain a better heart rate control.  We will wait to do so until he has started the lisinopril.   3.  Hyperlipidemia.  I do not know whether the LDL cholesterol was taken while he was on the simvastatin.  This should probably be done at some time in the near future.      PLAN:   1.  Coronary angiogram, possible angioplasty, stenting or bypass surgery.  All of this has been discussed with the patient.  Risk factors including death, heart attack, stroke,  bleeding diathesis including the need for blood transfusion, arrhythmia including the need for cardioversion, dye nephropathy, allergic reaction to the x-ray dye or infection have all been discussed.  We also discussed intracoronary stenting and the risks thereof.  The patient wishes to proceed.   2.  We will treat his blood pressure a bit more aggressively trying to attain blood pressure below 140/90 and heart rate 60s-70s.   3.  We have added aspirin 81 mg daily.   4.  I have added lisinopril 10 mg daily.  We did discuss possible allergic reaction to this including facial swelling or cough.      It is my pleasure to assist in the care of Kevin Simeon.  All his questions were answered to his satisfaction.  Followup will be determined based on the results of the coronary angiogram.     Sincerely,    Noé Tejada MD     [facility]

## 2017-09-29 NOTE — PROGRESS NOTES
PRIMARY CARE PHYSICIAN:  Dr. Jody Blackburn.      HISTORY OF PRESENT ILLNESS:  I had the pleasure of seeing your patient, Kevin Simeon, at [facility] for evaluation of left shoulder burning and throbbing and an abnormal stress echocardiogram.  This patient states that he has injured both shoulders while working as an .  More recently, the left arm became more tender especially to palpation.  He would have times where this was burning and throbbing and radiating down the arm.  He saw Dr. Blackburn and a stress echocardiogram was ordered.  The stress echocardiogram showed below average functional capacity with target heart rate achieved.  The baseline shoulder aching went away during exercise and he did not have chest pain.  There was abnormal stress EKG with 2 mm ST depression in the inferior and precordial lateral leads.  The resting echo showed no wall motion abnormalities and ejection fraction of 55%-60%.  There was mild lateral wall hypokinesis with exercise but ejection fraction improved to greater than 70%.  An EKG from 09/14/2017 showed heart rate of 91 beats per minute but no ST or T-wave abnormalities.  This was a normal EKG.  This patient has multiple cardiovascular risk factors including a mother with left arm pain which was diagnosed later as an anginal equivalent.  She went on to have bypass surgery at age 47 years old and eventually needed a cardiac transplant.  The patient has a 1-year history of hypertension and hyperlipidemia.  He has been placed on Toprol-XL 50 mg per day along with simvastatin 40 mg at bedtime.  His fasting lipid profile from 09/2016 showed total cholesterol 202, HDL 40,  and triglycerides 224.  The patient has never had a myocardial infarction or stroke.  He generally exercises twice a day walking anywhere from 20-25 minutes on weekdays and 45 minutes on weekends.  He is a nonsmoker.  He drinks approximately 12-15 beers per week.      PAST MEDICAL  HISTORY:  Includes surgery on a right finger with repair.  No other hospitalizations or surgery.      SOCIAL HISTORY:  The patient is  and has 6 stepchildren.  He is an .      PHYSICAL EXAMINATION:  As listed below.      ASSESSMENT:   1.  Kevin Simeon is a pleasant 57-year-old male with left shoulder pain at rest and with activities.  Some of this is undoubtedly musculoskeletal.  However, the patient has an abnormal stress echocardiogram with possible lateral wall ischemia.  Given these findings coronary angiography is suggested.  His blood pressure is elevated and perhaps that is adding to possible cardiac ischemia.  We will treat his blood pressure a bit more aggressively.   2.  Hypertension.  The patient's systolic and diastolic blood pressure is elevated.  I have taken the liberty of adding lisinopril 10 mg.  We can also increase his Toprol-XL dose to try to attain a better heart rate control.  We will wait to do so until he has started the lisinopril.   3.  Hyperlipidemia.  I do not know whether the LDL cholesterol was taken while he was on the simvastatin.  This should probably be done at some time in the near future.      PLAN:   1.  Coronary angiogram, possible angioplasty, stenting or bypass surgery.  All of this has been discussed with the patient.  Risk factors including death, heart attack, stroke, bleeding diathesis including the need for blood transfusion, arrhythmia including the need for cardioversion, dye nephropathy, allergic reaction to the x-ray dye or infection have all been discussed.  We also discussed intracoronary stenting and the risks thereof.  The patient wishes to proceed.   2.  We will treat his blood pressure a bit more aggressively trying to attain blood pressure below 140/90 and heart rate 60s-70s.   3.  We have added aspirin 81 mg daily.   4.  I have added lisinopril 10 mg daily.  We did discuss possible allergic reaction to this including facial swelling or cough.       It is my pleasure to assist in the care of Kevin Ferrari.  All his questions were answered to his satisfaction.  Followup will be determined based on the results of the coronary angiogram.      Bárbara Mae MD       cc:   [place] [address]         BÁRBARA MAE MD, [facility]             D: 2017 14:31   T: 2017 16:15   MT: RAEANN      Name:     KEVIN FERRARI   MRN:      [id number]        Account:      [id number]   :      1960           Service Date: 2017      Document: [id number]

## 2017-10-03 DIAGNOSIS — R94.39 ABNORMAL CARDIOVASCULAR STRESS TEST: Primary | ICD-10-CM

## 2017-10-06 ENCOUNTER — CARE COORDINATION (OUTPATIENT)
Dept: CARDIOLOGY | Facility: CLINIC | Age: 57
End: 2017-10-06

## 2017-10-06 RX ORDER — LIDOCAINE 40 MG/G
CREAM TOPICAL
Status: CANCELLED | OUTPATIENT
Start: 2017-10-06

## 2017-10-06 RX ORDER — POTASSIUM CHLORIDE 750 MG/1
20 TABLET, EXTENDED RELEASE ORAL
Status: CANCELLED | OUTPATIENT
Start: 2017-10-06

## 2017-10-06 RX ORDER — ASPIRIN 81 MG/1
81 TABLET ORAL DAILY
Status: CANCELLED | OUTPATIENT
Start: 2017-10-06

## 2017-10-06 RX ORDER — SODIUM CHLORIDE 9 MG/ML
INJECTION, SOLUTION INTRAVENOUS CONTINUOUS
Status: CANCELLED | OUTPATIENT
Start: 2017-10-06

## 2017-10-06 NOTE — PROGRESS NOTES
LEFT HEART CATH PREP INSTRUCTIONS    Patient is scheduled for a left heart cath at [facility], on 10/10/17.  Check in time is at 0930 and procedure time is at 1130.  Patient should not eat or drink after midnight on 10/9/17 .    Patient is not taking a diuretic, insulin, metformin or anticoagulant.   Patient to continue his current dose of Aspirin. Patient can take his medications in the morning with small sips of water.   Patient does not have an allergy to contrast dye.   Patient has someone available to drive him home from the hospital and can stay with him for 24 hours after the procedure.  This is a same day procedure. Patient should pack an overnight bag just in case he may need to stay overnight.  Patient is scheduled to have pre-cath labs drawn 10/9/17 at 0730. Patient is scheduled for a nurse only visit 10/13/17 and f/u visit w/ Tameka Álvarez CNP 10/24/17.  Cath order set entered.      Carroll WARREN  [facility]

## 2017-10-09 ENCOUNTER — TELEPHONE (OUTPATIENT)
Dept: CARDIOLOGY | Facility: CLINIC | Age: 57
End: 2017-10-09

## 2017-10-09 DIAGNOSIS — R94.39 ABNORMAL CARDIOVASCULAR STRESS TEST: ICD-10-CM

## 2017-10-09 LAB
ANION GAP SERPL CALCULATED.3IONS-SCNC: 3 MMOL/L (ref 3–14)
APTT PPP: 25 SEC (ref 22–37)
BUN SERPL-MCNC: 20 MG/DL (ref 7–30)
CALCIUM SERPL-MCNC: 8.8 MG/DL (ref 8.5–10.1)
CHLORIDE SERPL-SCNC: 103 MMOL/L (ref 94–109)
CO2 SERPL-SCNC: 31 MMOL/L (ref 20–32)
CREAT SERPL-MCNC: 0.92 MG/DL (ref 0.66–1.25)
ERYTHROCYTE [DISTWIDTH] IN BLOOD BY AUTOMATED COUNT: 12.1 % (ref 10–15)
GFR SERPL CREATININE-BSD FRML MDRD: 85 ML/MIN/1.7M2
GLUCOSE SERPL-MCNC: 99 MG/DL (ref 70–99)
HCT VFR BLD AUTO: 42.6 % (ref 40–53)
HGB BLD-MCNC: 14.5 G/DL (ref 13.3–17.7)
INR PPP: 1.02 (ref 0.86–1.14)
MCH RBC QN AUTO: 32.1 PG (ref 26.5–33)
MCHC RBC AUTO-ENTMCNC: 34 G/DL (ref 31.5–36.5)
MCV RBC AUTO: 94 FL (ref 78–100)
PLATELET # BLD AUTO: 252 10E9/L (ref 150–450)
POTASSIUM SERPL-SCNC: 4.2 MMOL/L (ref 3.4–5.3)
RBC # BLD AUTO: 4.52 10E12/L (ref 4.4–5.9)
SODIUM SERPL-SCNC: 137 MMOL/L (ref 133–144)
WBC # BLD AUTO: 7 10E9/L (ref 4–11)

## 2017-10-09 PROCEDURE — 85610 PROTHROMBIN TIME: CPT | Performed by: INTERNAL MEDICINE

## 2017-10-09 PROCEDURE — 85730 THROMBOPLASTIN TIME PARTIAL: CPT | Performed by: INTERNAL MEDICINE

## 2017-10-09 PROCEDURE — 36415 COLL VENOUS BLD VENIPUNCTURE: CPT | Performed by: INTERNAL MEDICINE

## 2017-10-09 PROCEDURE — 80048 BASIC METABOLIC PNL TOTAL CA: CPT | Performed by: INTERNAL MEDICINE

## 2017-10-09 PROCEDURE — 85027 COMPLETE CBC AUTOMATED: CPT | Performed by: INTERNAL MEDICINE

## 2017-10-09 NOTE — TELEPHONE ENCOUNTER
Patient is undergoing a coronary angiogram on 10-10-17. Patient will be gone the next week for vacation, patient is going to [place]. I was going to have patient come see me on Friday to check his cath insertion site. Instead, I will have patient call me if he is having any concerns about the healing of his insertion site. He is going to keep his appt with Tameka Álvarez NP on 10-24-17. Patient had no questions regarding his f/u.     Cuco WARREN  [facility]

## 2017-10-10 ENCOUNTER — APPOINTMENT (OUTPATIENT)
Dept: CARDIOLOGY | Facility: CLINIC | Age: 57
End: 2017-10-10
Attending: INTERNAL MEDICINE
Payer: COMMERCIAL

## 2017-10-10 ENCOUNTER — HOSPITAL ENCOUNTER (OUTPATIENT)
Facility: CLINIC | Age: 57
Discharge: HOME | End: 2017-10-10
Attending: INTERNAL MEDICINE | Admitting: INTERNAL MEDICINE
Payer: COMMERCIAL

## 2017-10-10 VITALS
DIASTOLIC BLOOD PRESSURE: 75 MMHG | BODY MASS INDEX: 26.08 KG/M2 | HEART RATE: 86 BPM | OXYGEN SATURATION: 96 % | HEIGHT: 71 IN | WEIGHT: 186.29 LBS | SYSTOLIC BLOOD PRESSURE: 125 MMHG | TEMPERATURE: 98 F

## 2017-10-10 DIAGNOSIS — R94.39 ABNORMAL CARDIOVASCULAR STRESS TEST: ICD-10-CM

## 2017-10-10 DIAGNOSIS — Z98.61 POSTSURGICAL PERCUTANEOUS TRANSLUMINAL CORONARY ANGIOPLASTY STATUS: ICD-10-CM

## 2017-10-10 DIAGNOSIS — Z98.61 STATUS POST CORONARY ANGIOPLASTY: ICD-10-CM

## 2017-10-10 DIAGNOSIS — I25.10 CORONARY ARTERY DISEASE INVOLVING NATIVE HEART, ANGINA PRESENCE UNSPECIFIED, UNSPECIFIED VESSEL OR LESION TYPE: ICD-10-CM

## 2017-10-10 PROBLEM — Z98.890 STATUS POST CORONARY ANGIOGRAM: Status: ACTIVE | Noted: 2017-10-10

## 2017-10-10 LAB
INTERPRETATION ECG - MUSE: NORMAL
INTERPRETATION ECG - MUSE: NORMAL
KCT BLD-ACNC: 303 SEC (ref 105–167)

## 2017-10-10 PROCEDURE — 93454 CORONARY ARTERY ANGIO S&I: CPT | Mod: 26 | Performed by: INTERNAL MEDICINE

## 2017-10-10 PROCEDURE — 93010 ELECTROCARDIOGRAM REPORT: CPT | Mod: 76 | Performed by: INTERNAL MEDICINE

## 2017-10-10 PROCEDURE — 25000125 ZZHC RX 250: Performed by: INTERNAL MEDICINE

## 2017-10-10 PROCEDURE — 93454 CORONARY ARTERY ANGIO S&I: CPT

## 2017-10-10 PROCEDURE — C9600 PERC DRUG-EL COR STENT SING: HCPCS | Mod: LD

## 2017-10-10 PROCEDURE — B2111ZZ FLUOROSCOPY OF MULTIPLE CORONARY ARTERIES USING LOW OSMOLAR CONTRAST: ICD-10-PCS | Performed by: INTERNAL MEDICINE

## 2017-10-10 PROCEDURE — 27210856 ZZH ACCESS HEART CATH CR2

## 2017-10-10 PROCEDURE — 027034Z DILATION OF CORONARY ARTERY, ONE ARTERY WITH DRUG-ELUTING INTRALUMINAL DEVICE, PERCUTANEOUS APPROACH: ICD-10-PCS | Performed by: INTERNAL MEDICINE

## 2017-10-10 PROCEDURE — C1874 STENT, COATED/COV W/DEL SYS: HCPCS

## 2017-10-10 PROCEDURE — 27210827 ZZH KIT ACIST INJECTOR CR6

## 2017-10-10 PROCEDURE — 27210946 ZZH KIT HC TOTES DISP CR8

## 2017-10-10 PROCEDURE — 25000128 H RX IP 250 OP 636: Performed by: INTERNAL MEDICINE

## 2017-10-10 PROCEDURE — C1725 CATH, TRANSLUMIN NON-LASER: HCPCS

## 2017-10-10 PROCEDURE — 99153 MOD SED SAME PHYS/QHP EA: CPT

## 2017-10-10 PROCEDURE — 85347 COAGULATION TIME ACTIVATED: CPT

## 2017-10-10 PROCEDURE — 25000132 ZZH RX MED GY IP 250 OP 250 PS 637: Performed by: INTERNAL MEDICINE

## 2017-10-10 PROCEDURE — C1769 GUIDE WIRE: HCPCS

## 2017-10-10 PROCEDURE — 99152 MOD SED SAME PHYS/QHP 5/>YRS: CPT | Performed by: INTERNAL MEDICINE

## 2017-10-10 PROCEDURE — 92928 PRQ TCAT PLMT NTRAC ST 1 LES: CPT | Mod: LD | Performed by: INTERNAL MEDICINE

## 2017-10-10 PROCEDURE — 27210759 ZZH DEVICE INFLATION CR6

## 2017-10-10 PROCEDURE — C1887 CATHETER, GUIDING: HCPCS

## 2017-10-10 PROCEDURE — 27210742 ZZH CATH CR1

## 2017-10-10 PROCEDURE — 99152 MOD SED SAME PHYS/QHP 5/>YRS: CPT

## 2017-10-10 PROCEDURE — C1894 INTRO/SHEATH, NON-LASER: HCPCS

## 2017-10-10 RX ORDER — ASPIRIN 81 MG/1
81 TABLET ORAL DAILY
Status: DISCONTINUED | OUTPATIENT
Start: 2017-10-10 | End: 2017-10-10 | Stop reason: HOSPADM

## 2017-10-10 RX ORDER — ACETAMINOPHEN 325 MG/1
325-650 TABLET ORAL EVERY 4 HOURS PRN
Status: DISCONTINUED | OUTPATIENT
Start: 2017-10-10 | End: 2017-10-10 | Stop reason: HOSPADM

## 2017-10-10 RX ORDER — ONDANSETRON 2 MG/ML
4 INJECTION INTRAMUSCULAR; INTRAVENOUS EVERY 4 HOURS PRN
Status: DISCONTINUED | OUTPATIENT
Start: 2017-10-10 | End: 2017-10-10 | Stop reason: HOSPADM

## 2017-10-10 RX ORDER — NITROGLYCERIN 20 MG/100ML
.07-2 INJECTION INTRAVENOUS CONTINUOUS PRN
Status: DISCONTINUED | OUTPATIENT
Start: 2017-10-10 | End: 2017-10-10 | Stop reason: HOSPADM

## 2017-10-10 RX ORDER — CLOPIDOGREL 300 MG/1
TABLET, FILM COATED ORAL
Status: DISCONTINUED
Start: 2017-10-10 | End: 2017-10-10 | Stop reason: HOSPADM

## 2017-10-10 RX ORDER — ADENOSINE 3 MG/ML
12-12000 INJECTION, SOLUTION INTRAVENOUS
Status: DISCONTINUED | OUTPATIENT
Start: 2017-10-10 | End: 2017-10-10 | Stop reason: HOSPADM

## 2017-10-10 RX ORDER — ENALAPRILAT 1.25 MG/ML
1.25-2.5 INJECTION INTRAVENOUS
Status: DISCONTINUED | OUTPATIENT
Start: 2017-10-10 | End: 2017-10-10 | Stop reason: HOSPADM

## 2017-10-10 RX ORDER — PROTAMINE SULFATE 10 MG/ML
1-5 INJECTION, SOLUTION INTRAVENOUS
Status: DISCONTINUED | OUTPATIENT
Start: 2017-10-10 | End: 2017-10-10 | Stop reason: HOSPADM

## 2017-10-10 RX ORDER — LIDOCAINE HYDROCHLORIDE 10 MG/ML
1-10 INJECTION, SOLUTION INFILTRATION; PERINEURAL
Status: COMPLETED | OUTPATIENT
Start: 2017-10-10 | End: 2017-10-10

## 2017-10-10 RX ORDER — FENTANYL CITRATE 50 UG/ML
INJECTION, SOLUTION INTRAMUSCULAR; INTRAVENOUS
Status: DISCONTINUED
Start: 2017-10-10 | End: 2017-10-10 | Stop reason: HOSPADM

## 2017-10-10 RX ORDER — CLOPIDOGREL BISULFATE 75 MG/1
75 TABLET ORAL
Status: DISCONTINUED | OUTPATIENT
Start: 2017-10-10 | End: 2017-10-10 | Stop reason: HOSPADM

## 2017-10-10 RX ORDER — FLUMAZENIL 0.1 MG/ML
0.2 INJECTION, SOLUTION INTRAVENOUS
Status: DISCONTINUED | OUTPATIENT
Start: 2017-10-10 | End: 2017-10-10 | Stop reason: HOSPADM

## 2017-10-10 RX ORDER — NALOXONE HYDROCHLORIDE 0.4 MG/ML
0.4 INJECTION, SOLUTION INTRAMUSCULAR; INTRAVENOUS; SUBCUTANEOUS EVERY 5 MIN PRN
Status: DISCONTINUED | OUTPATIENT
Start: 2017-10-10 | End: 2017-10-10 | Stop reason: HOSPADM

## 2017-10-10 RX ORDER — NITROGLYCERIN 0.4 MG/1
0.4 TABLET SUBLINGUAL EVERY 5 MIN PRN
Status: DISCONTINUED | OUTPATIENT
Start: 2017-10-10 | End: 2017-10-10 | Stop reason: HOSPADM

## 2017-10-10 RX ORDER — VERAPAMIL HYDROCHLORIDE 2.5 MG/ML
1-2.5 INJECTION, SOLUTION INTRAVENOUS
Status: COMPLETED | OUTPATIENT
Start: 2017-10-10 | End: 2017-10-10

## 2017-10-10 RX ORDER — FENTANYL CITRATE 50 UG/ML
25-50 INJECTION, SOLUTION INTRAMUSCULAR; INTRAVENOUS
Status: DISCONTINUED | OUTPATIENT
Start: 2017-10-10 | End: 2017-10-10 | Stop reason: HOSPADM

## 2017-10-10 RX ORDER — ATROPINE SULFATE 0.1 MG/ML
0.5 INJECTION INTRAVENOUS EVERY 5 MIN PRN
Status: DISCONTINUED | OUTPATIENT
Start: 2017-10-10 | End: 2017-10-10 | Stop reason: HOSPADM

## 2017-10-10 RX ORDER — LORAZEPAM 2 MG/ML
.5-2 INJECTION INTRAMUSCULAR EVERY 4 HOURS PRN
Status: DISCONTINUED | OUTPATIENT
Start: 2017-10-10 | End: 2017-10-10 | Stop reason: HOSPADM

## 2017-10-10 RX ORDER — NITROGLYCERIN 5 MG/ML
100-200 VIAL (ML) INTRAVENOUS
Status: DISCONTINUED | OUTPATIENT
Start: 2017-10-10 | End: 2017-10-10 | Stop reason: HOSPADM

## 2017-10-10 RX ORDER — NITROGLYCERIN 5 MG/ML
100-500 VIAL (ML) INTRAVENOUS
Status: COMPLETED | OUTPATIENT
Start: 2017-10-10 | End: 2017-10-10

## 2017-10-10 RX ORDER — PROMETHAZINE HYDROCHLORIDE 25 MG/ML
6.25-25 INJECTION, SOLUTION INTRAMUSCULAR; INTRAVENOUS EVERY 4 HOURS PRN
Status: DISCONTINUED | OUTPATIENT
Start: 2017-10-10 | End: 2017-10-10 | Stop reason: HOSPADM

## 2017-10-10 RX ORDER — FUROSEMIDE 10 MG/ML
20-100 INJECTION INTRAMUSCULAR; INTRAVENOUS
Status: DISCONTINUED | OUTPATIENT
Start: 2017-10-10 | End: 2017-10-10 | Stop reason: HOSPADM

## 2017-10-10 RX ORDER — POTASSIUM CHLORIDE 7.45 MG/ML
10 INJECTION INTRAVENOUS
Status: DISCONTINUED | OUTPATIENT
Start: 2017-10-10 | End: 2017-10-10 | Stop reason: HOSPADM

## 2017-10-10 RX ORDER — DIPHENHYDRAMINE HYDROCHLORIDE 50 MG/ML
25-50 INJECTION INTRAMUSCULAR; INTRAVENOUS
Status: DISCONTINUED | OUTPATIENT
Start: 2017-10-10 | End: 2017-10-10 | Stop reason: HOSPADM

## 2017-10-10 RX ORDER — NITROGLYCERIN 5 MG/ML
VIAL (ML) INTRAVENOUS
Status: DISCONTINUED
Start: 2017-10-10 | End: 2017-10-10 | Stop reason: HOSPADM

## 2017-10-10 RX ORDER — DOBUTAMINE HYDROCHLORIDE 200 MG/100ML
2-20 INJECTION INTRAVENOUS CONTINUOUS PRN
Status: DISCONTINUED | OUTPATIENT
Start: 2017-10-10 | End: 2017-10-10 | Stop reason: HOSPADM

## 2017-10-10 RX ORDER — ATROPINE SULFATE 0.1 MG/ML
.5-1 INJECTION INTRAVENOUS
Status: DISCONTINUED | OUTPATIENT
Start: 2017-10-10 | End: 2017-10-10 | Stop reason: HOSPADM

## 2017-10-10 RX ORDER — DOPAMINE HYDROCHLORIDE 160 MG/100ML
2-20 INJECTION, SOLUTION INTRAVENOUS CONTINUOUS PRN
Status: DISCONTINUED | OUTPATIENT
Start: 2017-10-10 | End: 2017-10-10 | Stop reason: HOSPADM

## 2017-10-10 RX ORDER — HEPARIN SODIUM 1000 [USP'U]/ML
INJECTION, SOLUTION INTRAVENOUS; SUBCUTANEOUS
Status: DISCONTINUED
Start: 2017-10-10 | End: 2017-10-10 | Stop reason: HOSPADM

## 2017-10-10 RX ORDER — IOPAMIDOL 755 MG/ML
100 INJECTION, SOLUTION INTRAVASCULAR ONCE
Status: COMPLETED | OUTPATIENT
Start: 2017-10-10 | End: 2017-10-10

## 2017-10-10 RX ORDER — VERAPAMIL HYDROCHLORIDE 2.5 MG/ML
INJECTION, SOLUTION INTRAVENOUS
Status: DISCONTINUED
Start: 2017-10-10 | End: 2017-10-10 | Stop reason: HOSPADM

## 2017-10-10 RX ORDER — MORPHINE SULFATE 4 MG/ML
1-2 INJECTION, SOLUTION INTRAMUSCULAR; INTRAVENOUS EVERY 5 MIN PRN
Status: DISCONTINUED | OUTPATIENT
Start: 2017-10-10 | End: 2017-10-10 | Stop reason: HOSPADM

## 2017-10-10 RX ORDER — ACETAMINOPHEN 325 MG/1
TABLET ORAL
Status: DISCONTINUED
Start: 2017-10-10 | End: 2017-10-10 | Stop reason: HOSPADM

## 2017-10-10 RX ORDER — PHENYLEPHRINE HCL IN 0.9% NACL 1 MG/10 ML
20-100 SYRINGE (ML) INTRAVENOUS
Status: DISCONTINUED | OUTPATIENT
Start: 2017-10-10 | End: 2017-10-10 | Stop reason: HOSPADM

## 2017-10-10 RX ORDER — PRASUGREL 10 MG/1
10-60 TABLET, FILM COATED ORAL
Status: DISCONTINUED | OUTPATIENT
Start: 2017-10-10 | End: 2017-10-10 | Stop reason: HOSPADM

## 2017-10-10 RX ORDER — LIDOCAINE HYDROCHLORIDE 10 MG/ML
30 INJECTION, SOLUTION EPIDURAL; INFILTRATION; INTRACAUDAL; PERINEURAL
Status: DISCONTINUED | OUTPATIENT
Start: 2017-10-10 | End: 2017-10-10 | Stop reason: HOSPADM

## 2017-10-10 RX ORDER — LIDOCAINE 40 MG/G
CREAM TOPICAL
Status: DISCONTINUED | OUTPATIENT
Start: 2017-10-10 | End: 2017-10-10 | Stop reason: HOSPADM

## 2017-10-10 RX ORDER — HYDROCODONE BITARTRATE AND ACETAMINOPHEN 5; 325 MG/1; MG/1
1-2 TABLET ORAL EVERY 4 HOURS PRN
Status: DISCONTINUED | OUTPATIENT
Start: 2017-10-10 | End: 2017-10-10 | Stop reason: HOSPADM

## 2017-10-10 RX ORDER — NICARDIPINE HYDROCHLORIDE 2.5 MG/ML
100 INJECTION INTRAVENOUS
Status: DISCONTINUED | OUTPATIENT
Start: 2017-10-10 | End: 2017-10-10 | Stop reason: HOSPADM

## 2017-10-10 RX ORDER — SODIUM NITROPRUSSIDE 25 MG/ML
100-200 INJECTION INTRAVENOUS
Status: DISCONTINUED | OUTPATIENT
Start: 2017-10-10 | End: 2017-10-10 | Stop reason: HOSPADM

## 2017-10-10 RX ORDER — NIFEDIPINE 10 MG/1
10 CAPSULE ORAL
Status: DISCONTINUED | OUTPATIENT
Start: 2017-10-10 | End: 2017-10-10 | Stop reason: HOSPADM

## 2017-10-10 RX ORDER — POTASSIUM CHLORIDE 1500 MG/1
20 TABLET, EXTENDED RELEASE ORAL
Status: DISCONTINUED | OUTPATIENT
Start: 2017-10-10 | End: 2017-10-10 | Stop reason: HOSPADM

## 2017-10-10 RX ORDER — CLOPIDOGREL BISULFATE 75 MG/1
75 TABLET ORAL ONCE
Status: DISCONTINUED | OUTPATIENT
Start: 2017-10-11 | End: 2017-10-10 | Stop reason: HOSPADM

## 2017-10-10 RX ORDER — CLOPIDOGREL BISULFATE 75 MG/1
300-600 TABLET ORAL
Status: COMPLETED | OUTPATIENT
Start: 2017-10-10 | End: 2017-10-10

## 2017-10-10 RX ORDER — NALOXONE HYDROCHLORIDE 0.4 MG/ML
.1-.4 INJECTION, SOLUTION INTRAMUSCULAR; INTRAVENOUS; SUBCUTANEOUS
Status: DISCONTINUED | OUTPATIENT
Start: 2017-10-10 | End: 2017-10-10 | Stop reason: HOSPADM

## 2017-10-10 RX ORDER — METHYLPREDNISOLONE SODIUM SUCCINATE 125 MG/2ML
125 INJECTION, POWDER, LYOPHILIZED, FOR SOLUTION INTRAMUSCULAR; INTRAVENOUS
Status: DISCONTINUED | OUTPATIENT
Start: 2017-10-10 | End: 2017-10-10 | Stop reason: HOSPADM

## 2017-10-10 RX ORDER — EPINEPHRINE 1 MG/ML
0.3 INJECTION, SOLUTION, CONCENTRATE INTRAVENOUS
Status: DISCONTINUED | OUTPATIENT
Start: 2017-10-10 | End: 2017-10-10 | Stop reason: HOSPADM

## 2017-10-10 RX ORDER — BUPIVACAINE HYDROCHLORIDE 2.5 MG/ML
1-10 INJECTION, SOLUTION EPIDURAL; INFILTRATION; INTRACAUDAL
Status: DISCONTINUED | OUTPATIENT
Start: 2017-10-10 | End: 2017-10-10 | Stop reason: HOSPADM

## 2017-10-10 RX ORDER — CLOPIDOGREL BISULFATE 75 MG/1
75 TABLET ORAL DAILY
Qty: 90 TABLET | Refills: 3 | Status: SHIPPED | OUTPATIENT
Start: 2017-10-11 | End: 2017-10-29

## 2017-10-10 RX ORDER — DEXTROSE MONOHYDRATE 25 G/50ML
12.5-5 INJECTION, SOLUTION INTRAVENOUS EVERY 30 MIN PRN
Status: DISCONTINUED | OUTPATIENT
Start: 2017-10-10 | End: 2017-10-10 | Stop reason: HOSPADM

## 2017-10-10 RX ORDER — ASPIRIN 81 MG/1
81-324 TABLET, CHEWABLE ORAL
Status: DISCONTINUED | OUTPATIENT
Start: 2017-10-10 | End: 2017-10-10 | Stop reason: HOSPADM

## 2017-10-10 RX ORDER — POTASSIUM CHLORIDE 29.8 MG/ML
20 INJECTION INTRAVENOUS
Status: DISCONTINUED | OUTPATIENT
Start: 2017-10-10 | End: 2017-10-10 | Stop reason: HOSPADM

## 2017-10-10 RX ORDER — SODIUM CHLORIDE 9 MG/ML
INJECTION, SOLUTION INTRAVENOUS CONTINUOUS
Status: DISCONTINUED | OUTPATIENT
Start: 2017-10-10 | End: 2017-10-10 | Stop reason: HOSPADM

## 2017-10-10 RX ORDER — ASPIRIN 325 MG
325 TABLET ORAL
Status: DISCONTINUED | OUTPATIENT
Start: 2017-10-10 | End: 2017-10-10 | Stop reason: HOSPADM

## 2017-10-10 RX ORDER — HEPARIN SODIUM 1000 [USP'U]/ML
1000-10000 INJECTION, SOLUTION INTRAVENOUS; SUBCUTANEOUS EVERY 5 MIN PRN
Status: DISCONTINUED | OUTPATIENT
Start: 2017-10-10 | End: 2017-10-10 | Stop reason: HOSPADM

## 2017-10-10 RX ORDER — METOPROLOL TARTRATE 1 MG/ML
5 INJECTION, SOLUTION INTRAVENOUS EVERY 5 MIN PRN
Status: DISCONTINUED | OUTPATIENT
Start: 2017-10-10 | End: 2017-10-10 | Stop reason: HOSPADM

## 2017-10-10 RX ORDER — NALOXONE HYDROCHLORIDE 0.4 MG/ML
.2-.4 INJECTION, SOLUTION INTRAMUSCULAR; INTRAVENOUS; SUBCUTANEOUS
Status: DISCONTINUED | OUTPATIENT
Start: 2017-10-10 | End: 2017-10-10 | Stop reason: HOSPADM

## 2017-10-10 RX ORDER — PROTAMINE SULFATE 10 MG/ML
25-100 INJECTION, SOLUTION INTRAVENOUS EVERY 5 MIN PRN
Status: DISCONTINUED | OUTPATIENT
Start: 2017-10-10 | End: 2017-10-10 | Stop reason: HOSPADM

## 2017-10-10 RX ORDER — HYDRALAZINE HYDROCHLORIDE 20 MG/ML
10-20 INJECTION INTRAMUSCULAR; INTRAVENOUS
Status: DISCONTINUED | OUTPATIENT
Start: 2017-10-10 | End: 2017-10-10 | Stop reason: HOSPADM

## 2017-10-10 RX ADMIN — MIDAZOLAM 1 MG: 1 INJECTION INTRAMUSCULAR; INTRAVENOUS at 11:50

## 2017-10-10 RX ADMIN — NITROGLYCERIN 300 MCG: 5 INJECTION, SOLUTION INTRAVENOUS at 12:18

## 2017-10-10 RX ADMIN — HEPARIN SODIUM 4000 UNITS: 1000 INJECTION, SOLUTION INTRAVENOUS; SUBCUTANEOUS at 11:55

## 2017-10-10 RX ADMIN — ACETAMINOPHEN 650 MG: 325 TABLET, FILM COATED ORAL at 14:38

## 2017-10-10 RX ADMIN — LIDOCAINE HYDROCHLORIDE 1 ML: 10 INJECTION, SOLUTION EPIDURAL; INFILTRATION; INTRACAUDAL; PERINEURAL at 11:52

## 2017-10-10 RX ADMIN — FENTANYL CITRATE 25 MCG: 50 INJECTION INTRAMUSCULAR; INTRAVENOUS at 12:12

## 2017-10-10 RX ADMIN — IOPAMIDOL 214 ML: 755 INJECTION, SOLUTION INTRAVASCULAR at 10:15

## 2017-10-10 RX ADMIN — NITROGLYCERIN 400 MCG: 5 INJECTION, SOLUTION INTRAVENOUS at 11:57

## 2017-10-10 RX ADMIN — FENTANYL CITRATE 50 MCG: 50 INJECTION INTRAMUSCULAR; INTRAVENOUS at 11:50

## 2017-10-10 RX ADMIN — MIDAZOLAM 0.5 MG: 1 INJECTION INTRAMUSCULAR; INTRAVENOUS at 12:12

## 2017-10-10 RX ADMIN — NITROGLYCERIN 400 MCG: 5 INJECTION, SOLUTION INTRAVENOUS at 12:22

## 2017-10-10 RX ADMIN — CLOPIDOGREL BISULFATE 600 MG: 300 TABLET, FILM COATED ORAL at 12:04

## 2017-10-10 RX ADMIN — SODIUM CHLORIDE: 9 INJECTION, SOLUTION INTRAVENOUS at 09:42

## 2017-10-10 RX ADMIN — VERAPAMIL HYDROCHLORIDE 2.5 MG: 2.5 INJECTION, SOLUTION INTRAVENOUS at 11:57

## 2017-10-10 RX ADMIN — SODIUM CHLORIDE: 9 INJECTION, SOLUTION INTRAVENOUS at 13:02

## 2017-10-10 RX ADMIN — HEPARIN SODIUM 5000 UNITS: 1000 INJECTION, SOLUTION INTRAVENOUS; SUBCUTANEOUS at 12:05

## 2017-10-10 NOTE — IP AVS SNAPSHOT
MRN:[id number]                      After Visit Summary   10/10/2017    Kevin Simeon    MRN: [id number]           Visit Information        Department      10/10/2017  9:26 AM [facility] Cath Lab          Review of your medicines      UNREVIEWED medicines. Ask your doctor about these medicines        Dose / Directions    * aspirin EC 81 MG EC tablet   This may have changed:  Another medication with the same name was added. Make sure you understand how and when to take each.   Used for:  Coronary artery disease involving native coronary artery of native heart, angina presence unspecified   Ask about: Which instructions should I use?        Dose:  81 mg   Take 1 tablet (81 mg) by mouth daily   Quantity:  1 tablet   Refills:  0       * aspirin 81 MG EC tablet   This may have changed:  You were already taking a medication with the same name, and this prescription was added. Make sure you understand how and when to take each.   Used for:  Coronary artery disease involving native heart, angina presence unspecified, unspecified vessel or lesion type, Status post coronary angioplasty   Ask about: Which instructions should I use?        Dose:  81 mg   Start taking on:  10/11/2017   Take 1 tablet (81 mg) by mouth daily Start tomorrow morning.   Quantity:  90 tablet   Refills:  3       lisinopril 10 MG tablet   Commonly known as:  PRINIVIL/ZESTRIL   Used for:  Benign essential hypertension        Dose:  10 mg   Take 1 tablet (10 mg) by mouth daily   Quantity:  90 tablet   Refills:  3       metoprolol 50 MG 24 hr tablet   Commonly known as:  TOPROL-XL   Used for:  Benign essential hypertension        Dose:  50 mg   Take 1 tablet (50 mg) by mouth daily   Quantity:  90 tablet   Refills:  2       simvastatin 40 MG tablet   Commonly known as:  ZOCOR   Used for:  Hyperlipidemia LDL goal <160        Dose:  40 mg   Take 1 tablet (40 mg) by mouth At Bedtime   Quantity:  90 tablet   Refills:  2       * Notice:  This  list has 2 medication(s) that are the same as other medications prescribed for you. Read the directions carefully, and ask your doctor or other care provider to review them with you.      START taking        Dose / Directions    clopidogrel 75 MG tablet   Commonly known as:  PLAVIX   Used for:  Coronary artery disease involving native heart, angina presence unspecified, unspecified vessel or lesion type, Status post coronary angioplasty        Dose:  75 mg   Start taking on:  10/11/2017   Take 1 tablet (75 mg) by mouth daily   Quantity:  90 tablet   Refills:  3            Where to get your medicines      Some of these will need a paper prescription and others can be bought over the counter. Ask your nurse if you have questions.     Bring a paper prescription for each of these medications     aspirin 81 MG EC tablet    clopidogrel 75 MG tablet               Prescriptions were sent or printed at these locations (2 Prescriptions)                   Other Prescriptions                Printed at Department/Unit printer (2 of 2)         aspirin 81 MG EC tablet               clopidogrel (PLAVIX) 75 MG tablet                 Protect others around you: Learn how to safely use, store and throw away your medicines at www.disposemymeds.org.         Follow-ups after your visit        Additional Services     CARDIAC REHAB REFERRAL       Please be aware that coverage of these services is subject to the terms and limitations of your health insurance plan.  Call member services at your health plan with any benefit or coverage questions.     Order is sent electronically to central rehab scheduling. Call [phone] if you haven't been contacted regarding these appointments within 2 business days of discharge.                  Your next 10 appointments already scheduled     Oct 24, 2017 12:30 PM CDT   Return Visit with FABIAN Whitley CNP   [facility] [place] ([facility])    [address]  [facility] [id number] [phone]               Care Instructions        After Care Instructions     Discharge Instructions - Follow up with [facility] Cardiologist       Follow -up with the [facility] of patient preference in 2-4 weeks            Discharge Instructions - Follow up with [facility] Nurse Practitioner        Follow up with [facility] Nurse Practitioner at [facility] patient preference in 7-10 days.            Discharge Instructions - IF on Metformin (Glucophage or Glucovance) or Metformin containing medications       IF on Metformin (Glucophage or Glucovance) or Metformin containing medications , schedule a Basic Metabolic Panel at [facility] or Primary Clinic in 48 - 72 hours post procedure and PRIOR TO resuming the Metformin or Metformin containing medications.  Hold Metformin (Glucophage or Glucovance) or Metformin containing medications until after the Basic Metabolic Panel on the 2nd or 3rd day following the procedure.  May resume after blood draw is complete.                  Further instructions from your care team         Going Home after an Angioplasty or Stent Placement (Cardiac)  ______________________________________________    Patient Name: Kevin Simeon  Date of Procedure: October 10, 2017    Doctor: daniel    After you go home:    Have an adult stay with you for 24 hours.    Drink plenty of fluids.    You may eat your normal diet, unless your doctor tells you otherwise.    For 24 hours:    Relax and take it easy.    Do NOT smoke.    Do NOT make any important or legal decisions.    Do NOT drive or operate machines at home or at work.    Do NOT drink alcohol.    Remove the Band-Aid after 24 hours. If there is minor oozing, apply another Band-aid and remove it after 12 hours.    For 2 days, do NOT have sex or do any heavy exercise.    Do NOT take a bath, or use a hot tub or pool for at least 3 days. You may shower        Care of wrist or arm site  It is  normal to have soreness at the puncture site and mild tingling in your hand for up to 3 days.    For 2 days, do not use your hand or arm to support your weight (such as rising from a chair) or bend your wrist (such as lifting a garage door).    For 2 days, do not lift more than 5 pounds or exercise your arm (tennis, golf or bowling).    If you start bleeding from the site in your arm:    Sit down and press firmly on the site with your fingers for 10 minutes. Call your doctor as soon as you can.    If the bleeding stops, sit still and keep your wrist straight for 2 hours.    Medicines    If you have started taking Plavix or Effient, do not stop taking it until you talk to your heart doctor (cardiologist).    If you are on metformin (Glucophage), do not restart it until you have blood tests (within 2 to 3 days after discharge). When your doctor tells you it is safe, you may restart the metformin.    If you have stopped any other medicines, check with your nurse or provider about when to restart them.    Call 911 right away if you have bleeding that is heavy or does not stop.    Call your doctor if:    You have a large or growing hard lump around the site.    The site is red, swollen, hot or tender.    Blood or fluid is draining from the site.    You have chills or a fever greater than 101 F (38 C).    Your leg or arm feels numb or cool.    You have hives, a rash or unusual itching.      [facility] [place]:  [phone] (7 days a week)      We will contact you tomorrow for follow-up. Number where we can reach you: ***       Additional Information About Your Visit        weave energy[employer] Information     [facility] gives you secure access to your electronic health record. If you see a primary care provider, you can also send messages to your care team and make appointments. If you have questions, please call your primary care clinic.  If you do not have a primary care provider, please call  "[phone] and they will assist you.        Care EveryWhere ID     This is your Care EveryWhere ID. This could be used by other organizations to access your [facility] medical records  [id number]        Your Vitals Were     Blood Pressure Pulse Temperature Height Weight Pulse Oximetry    113/80 (BP Location: Left arm) 87 98  F (36.7  C) 1.803 m (5' 10.98\") 84.5 kg (186 lb 4.6 oz) 93%    BMI (Body Mass Index)                   25.99 kg/m2            Primary Care Provider Office Phone # Fax #    Jody Blackburn [phone]      Equal Access to Services     [facility] TAYLOR : Lou Baker, corinne soto, jayro bartonmadavid hoffman, carlos anne. So [facility] [phone].    ATENCIÓN: Si habla español, tiene a evans disposición servicios gratuitos de asistencia lingüística. Llame al [phone].    We comply with applicable federal civil rights laws and [place] laws. We do not discriminate on the basis of race, color, national origin, age, disability, sex, sexual orientation, or gender identity.            Thank you!     Thank you for choosing [facility] for your care. Our goal is always to provide you with excellent care. Hearing back from our patients is one way we can continue to improve our services. Please take a few minutes to complete the written survey that you may receive in the mail after you visit. If you would like to speak to someone directly about your visit please contact Patient Relations at [phone]. Thank you!               Medication List: This is a list of all your medications and when to take them. Check marks below indicate your daily home schedule. Keep this list as a reference.      Medications           Morning Afternoon Evening Bedtime As Needed    clopidogrel 75 MG tablet   Commonly known as:  PLAVIX   Take 1 tablet (75 mg) by mouth daily   Start taking on:  10/11/2017   Last time this was given:  600 mg on 10/10/2017 12:04 " PM                                lisinopril 10 MG tablet   Commonly known as:  PRINIVIL/ZESTRIL   Take 1 tablet (10 mg) by mouth daily                                metoprolol 50 MG 24 hr tablet   Commonly known as:  TOPROL-XL   Take 1 tablet (50 mg) by mouth daily                                simvastatin 40 MG tablet   Commonly known as:  ZOCOR   Take 1 tablet (40 mg) by mouth At Bedtime                                  ASK your doctor about these medications           Morning Afternoon Evening Bedtime As Needed    * aspirin EC 81 MG EC tablet   Take 1 tablet (81 mg) by mouth daily   Ask about: Which instructions should I use?                                * aspirin 81 MG EC tablet   Take 1 tablet (81 mg) by mouth daily Start tomorrow morning.   Start taking on:  10/11/2017   Ask about: Which instructions should I use?                                * Notice:  This list has 2 medication(s) that are the same as other medications prescribed for you. Read the directions carefully, and ask your doctor or other care provider to review them with you.

## 2017-10-10 NOTE — DISCHARGE INSTRUCTIONS
Going Home after an Angioplasty or Stent Placement (Cardiac)  ______________________________________________    Patient Name: Kevin Simeon  Date of Procedure: October 10, 2017    Doctor: daniel    After you go home:    Have an adult stay with you for 24 hours.    Drink plenty of fluids.    You may eat your normal diet, unless your doctor tells you otherwise.    For 24 hours:    Relax and take it easy.    Do NOT smoke.    Do NOT make any important or legal decisions.    Do NOT drive or operate machines at home or at work.    Do NOT drink alcohol.    Remove the Band-Aid after 24 hours. If there is minor oozing, apply another Band-aid and remove it after 12 hours.    For 2 days, do NOT have sex or do any heavy exercise.    Do NOT take a bath, or use a hot tub or pool for at least 3 days. You may shower        Care of wrist or arm site  It is normal to have soreness at the puncture site and mild tingling in your hand for up to 3 days.    For 2 days, do not use your hand or arm to support your weight (such as rising from a chair) or bend your wrist (such as lifting a garage door).    For 2 days, do not lift more than 5 pounds or exercise your arm (tennis, golf or bowling).    If you start bleeding from the site in your arm:    Sit down and press firmly on the site with your fingers for 10 minutes. Call your doctor as soon as you can.    If the bleeding stops, sit still and keep your wrist straight for 2 hours.    Medicines    If you have started taking Plavix or Effient, do not stop taking it until you talk to your heart doctor (cardiologist).    If you are on metformin (Glucophage), do not restart it until you have blood tests (within 2 to 3 days after discharge). When your doctor tells you it is safe, you may restart the metformin.    If you have stopped any other medicines, check with your nurse or provider about when to restart them.    Call 911 right away if you have bleeding that is heavy or does not stop.    Call  your doctor if:    You have a large or growing hard lump around the site.    The site is red, swollen, hot or tender.    Blood or fluid is draining from the site.    You have chills or a fever greater than 101 F (38 C).    Your leg or arm feels numb or cool.    You have hives, a rash or unusual itching.      [facility] [place]:  [phone] (7 days a week)      We will contact you tomorrow for follow-up. Number where we can reach you: ***

## 2017-10-10 NOTE — PROGRESS NOTES
Right radial site, soft clean and dry.  DC instructions reviewed with pt and wife, all questions answered.  IV dc'd, vitals stable.  Pt ambulated to bathroom, gait steady.

## 2017-10-10 NOTE — PROGRESS NOTES
After he returned from walking to bathroom he developed scintillating lights, left eye with mild left sided headache. I examined patient immediately  Normal cranial nerves 2 through 12. Normal symmetric strength and sensation. Appropriate sensorium.  I am suspicious of migrainous like symptoms. Will treat symptomatically and observe for progression and or perseverance of symptoms.  I have asked nursing staff to notify me for change or new symptoms.    Carroll

## 2017-10-10 NOTE — CONSULTS
I have examined the patient, reviewed the history, medications and pre procedural tests. Shoulder pain with abnormal stress echo and CAD risk factors I have explained to the patient the risks of death, MI, stroke, hematoma, possible urgent bypass surgery for failed PCI, use of stents, thienopyridine agents, possible peripheral vascular complications, arrhythmia, the use of FFR in clinical decision-making and alternative of medical therapy alone in regards to left heart catheterization, left ventriculography, coronary angiography, and possible percutaneous coronary intervention. The patient voiced understanding and wishes to proceed. The patient has a good right radial pulse, normal ulnar pulse and a normal Benji's sign.

## 2017-10-10 NOTE — PROGRESS NOTES
Cardiology    Doing well now. No symptoms. I suspect he had migrainous type HA, now resolved. No focal deficits. He will go home and call if any recurrent problems.

## 2017-10-10 NOTE — PROGRESS NOTES
Pt return from bathroom with some flashing light in his eyes. Pt had some dizziness  70 heart rate 85.  here examine pt. Continue to observe.

## 2017-10-10 NOTE — PROCEDURES
Procedure  1) CAG  2) PCI D1  : 32 x 2.25 everolimus eluting PROMUS PREMIERE Stent    Approach RTR  Complications none  Indication unstable angina. Positive stress echo.    Findings  CX  Dominant. separate orifice no significant stenosis  RCA nondominant no focal stenosis  LAD Separate orifice. 99% D1 DAVID 1 flow  LMCA nonexistent    Post  PCI D1: no residual stenosis. TIIM3 flow.     [place]oles

## 2017-10-10 NOTE — IP AVS SNAPSHOT
[facility]    [address]    [facility] [address]    Phone:  [phone]                                       After Visit Summary   10/10/2017    Kevin Simeon    MRN: [id number]           After Visit Summary Signature Page     I have received my discharge instructions, and my questions have been answered. I have discussed any challenges I see with this plan with the nurse or doctor.    ..........................................................................................................................................  Patient/Patient Representative Signature      ..........................................................................................................................................  Patient Representative Print Name and Relationship to Patient    ..................................................               ................................................  Date                                            Time    ..........................................................................................................................................  Reviewed by Signature/Title    ...................................................              ..............................................  Date                                                            Time

## 2017-10-24 ENCOUNTER — OFFICE VISIT (OUTPATIENT)
Dept: CARDIOLOGY | Facility: CLINIC | Age: 57
End: 2017-10-24
Payer: COMMERCIAL

## 2017-10-24 ENCOUNTER — TELEPHONE (OUTPATIENT)
Dept: CARDIOLOGY | Facility: CLINIC | Age: 57
End: 2017-10-24

## 2017-10-24 VITALS
HEART RATE: 88 BPM | WEIGHT: 183.6 LBS | SYSTOLIC BLOOD PRESSURE: 138 MMHG | BODY MASS INDEX: 25.7 KG/M2 | DIASTOLIC BLOOD PRESSURE: 76 MMHG | HEIGHT: 71 IN

## 2017-10-24 DIAGNOSIS — I25.10 CORONARY ARTERY DISEASE INVOLVING NATIVE CORONARY ARTERY OF NATIVE HEART WITHOUT ANGINA PECTORIS: ICD-10-CM

## 2017-10-24 DIAGNOSIS — I25.10 CORONARY ARTERY DISEASE INVOLVING NATIVE CORONARY ARTERY OF NATIVE HEART WITHOUT ANGINA PECTORIS: Primary | ICD-10-CM

## 2017-10-24 LAB
ALT SERPL W P-5'-P-CCNC: 65 U/L (ref 0–70)
ANION GAP SERPL CALCULATED.3IONS-SCNC: 5 MMOL/L (ref 3–14)
BUN SERPL-MCNC: 13 MG/DL (ref 7–30)
CALCIUM SERPL-MCNC: 9 MG/DL (ref 8.5–10.1)
CHLORIDE SERPL-SCNC: 102 MMOL/L (ref 94–109)
CHOLEST SERPL-MCNC: 121 MG/DL
CO2 SERPL-SCNC: 30 MMOL/L (ref 20–32)
CREAT SERPL-MCNC: 0.85 MG/DL (ref 0.66–1.25)
GFR SERPL CREATININE-BSD FRML MDRD: >90 ML/MIN/1.7M2
GLUCOSE SERPL-MCNC: 80 MG/DL (ref 70–99)
HDLC SERPL-MCNC: 43 MG/DL
LDLC SERPL CALC-MCNC: 53 MG/DL
NONHDLC SERPL-MCNC: 78 MG/DL
POTASSIUM SERPL-SCNC: 3.9 MMOL/L (ref 3.4–5.3)
SODIUM SERPL-SCNC: 137 MMOL/L (ref 133–144)
TRIGL SERPL-MCNC: 124 MG/DL

## 2017-10-24 PROCEDURE — 99214 OFFICE O/P EST MOD 30 MIN: CPT | Performed by: NURSE PRACTITIONER

## 2017-10-24 PROCEDURE — 80048 BASIC METABOLIC PNL TOTAL CA: CPT | Performed by: NURSE PRACTITIONER

## 2017-10-24 PROCEDURE — 80061 LIPID PANEL: CPT | Performed by: NURSE PRACTITIONER

## 2017-10-24 PROCEDURE — 36415 COLL VENOUS BLD VENIPUNCTURE: CPT | Performed by: NURSE PRACTITIONER

## 2017-10-24 PROCEDURE — 84460 ALANINE AMINO (ALT) (SGPT): CPT | Performed by: NURSE PRACTITIONER

## 2017-10-24 RX ORDER — IBUPROFEN 200 MG
200 TABLET ORAL EVERY 4 HOURS PRN
COMMUNITY
End: 2019-12-09

## 2017-10-24 NOTE — LETTER
10/24/2017    Jody Blackburn MD  [address] [facility] [address]    RE: Kevin Blanco       Dear Colleague,    I had the pleasure of seeing Kevin Simeon in the [facility].    This is a 57-year-old male who presents to [facility] today for a followup visit.  He is a patient of Dr. Tejada's seen in our clinic for coronary artery disease, hypertension and hyperlipidemia.      Kevin has a strong family history of coronary artery disease.  He was experiencing some atypical-type left shoulder pain and recently was started on blood pressure medications due to hypertension.  He underwent a stress echocardiogram demonstrating inferior lateral hypokinesia.  He met with Dr. Tejada, aspirin was started and lisinopril was added due to persistent elevated blood pressure readings.  He was set up for coronary angiography on 10/10/2017.  This demonstrated is subtotal diagonal vessel.  He received a 2.25 x 32 mm drug-eluting stent reestablishing good flow, mild LAD disease, 70% mid right coronary artery disease.  Of note, he did have separate orifices for his LAD and circumflex.  There were no procedural complications.  He was started on Plavix.  He returns today for reassessment.      Kevin overall tells me he is doing well.  He actually has more energy.  He is doing his usual activities without any limitations.  He is no longer having any left shoulder pain.  Kevin denies any palpitations, lightheadedness, dizziness, orthopnea or peripheral edema.  He did come in fasting today.  He is planning on starting cardiac rehab Phase II tomorrow.      PHYSICAL EXAMINATION:  His blood pressure today is 138/76 mmHg, heart rate 88 beats per minute and is regular.  His lungs are clear.  There is no peripheral edema.  Right radial puncture site without bleeding, hematoma or bruit.  His weight is down 3 pounds.     Outpatient Encounter Prescriptions as of 10/24/2017   Medication  Sig Dispense Refill     ibuprofen (ADVIL/MOTRIN) 200 MG tablet Take 200 mg by mouth every 4 hours as needed for mild pain       aspirin 81 MG EC tablet Take 1 tablet (81 mg) by mouth daily Start tomorrow morning. 90 tablet 3     clopidogrel (PLAVIX) 75 MG tablet Take 1 tablet (75 mg) by mouth daily 90 tablet 3     lisinopril (PRINIVIL/ZESTRIL) 10 MG tablet Take 1 tablet (10 mg) by mouth daily 90 tablet 3     simvastatin (ZOCOR) 40 MG tablet Take 1 tablet (40 mg) by mouth At Bedtime 90 tablet 2     metoprolol (TOPROL-XL) 50 MG 24 hr tablet Take 1 tablet (50 mg) by mouth daily 90 tablet 2     [DISCONTINUED] aspirin EC 81 MG EC tablet Take 1 tablet (81 mg) by mouth daily 1 tablet 0     No facility-administered encounter medications on file as of 10/24/2017.       IMPRESSION AND PLAN:   1.  Coronary artery disease.  Recent evidence of a subtotal diagonal vessel, which was successfully revascularized with a drug-eluting stent.  He does have 70% remaining disease in the mid portion of the small dominant circumflex, otherwise mild LAD disease.  He is free from any angina symptoms and doing well in followup.  Will continue Plavix for 1 year, beta blockade and aspirin indefinitely, and ACE inhibitor therapy.   2.  Hypertension.  Blood pressure borderline today.  At this time, he has agreed to increase his metoprolol extended-release to 75 mg.  We will have him return in 1 month for reassessment.   3.  Hyperlipidemia.  It has been over a year since a cholesterol panel has been drawn.  He came in fasting today.  We will have him do this on the way out and review the results with him over the phone.      Thank you for allowing me to participate in this patient's care.     Sincerely,    FABIAN Hurd CNP     [facility]

## 2017-10-24 NOTE — MR AVS SNAPSHOT
After Visit Summary   10/24/2017    Kevin Simeon    MRN: [id number]           Patient Information     Date Of Birth          1960        Visit Information        Provider Department      10/24/2017 12:30 PM Tameka Álvarez APRN CNP [facility]        Today's Diagnoses     Coronary artery disease involving native coronary artery of native heart without angina pectoris    -  1      Care Instructions    Start cardiac rehab    Increase Metoprolol XR to 75mg at night  Continue taking the other medications as you are doing  Get labs drawn today.  We will call you with the results  Return in 1 month          Follow-ups after your visit        Additional Services     Follow-Up with Cardiac Advanced Practice Provider                 Your next 10 appointments already scheduled     Oct 26, 2017  7:30 AM CDT   Cardiac Evaluation with [facility] [address] [place] [address] ([facility] [place])    [address], [address]  [facility] [address] [phone]            Dec 04, 2017  3:50 PM CST   Return Visit with FABIAN Whitley CNP   [facility] ([facility])    [address]  [facility] [address] [phone]              Future tests that were ordered for you today     Open Future Orders        Priority Expected Expires Ordered    Follow-Up with Cardiac Advanced Practice Provider Routine 11/23/2017 10/24/2018 10/24/2017    Basic metabolic panel Routine 10/24/2017 10/24/2018 10/24/2017    Lipid Profile Routine 10/24/2017 10/24/2018 10/24/2017    ALT Routine 10/24/2017 10/24/2018 10/24/2017            Who to contact     If you have questions or need follow up information about today's clinic visit or your schedule please contact [facility] directly at [phone].  Normal or non-critical lab and imaging results will be communicated  "to you by [employer], letter or phone within 4 business days after the clinic has received the results. If you do not hear from us within 7 days, please contact the clinic through [employer] or phone. If you have a critical or abnormal lab result, we will notify you by phone as soon as possible.  Submit refill requests through [employer] or call your pharmacy and they will forward the refill request to us. Please allow 3 business days for your refill to be completed.          Additional Information About Your Visit        [employer] Information     [employer] gives you secure access to your electronic health record. If you see a primary care provider, you can also send messages to your care team and make appointments. If you have questions, please call your primary care clinic.  If you do not have a primary care provider, please call [phone] and they will assist you.        Care EveryWhere ID     This is your Care EveryWhere ID. This could be used by other organizations to access your [facility] medical records  [id number]        Your Vitals Were     Pulse Height BMI (Body Mass Index)             88 1.803 m (5' 11\") 25.61 kg/m2          Blood Pressure from Last 3 Encounters:   10/24/17 138/76   10/10/17 (P) 119/78   09/29/17 (!) 170/98    Weight from Last 3 Encounters:   10/24/17 83.3 kg (183 lb 9.6 oz)   10/10/17 84.5 kg (186 lb 4.6 oz)   09/29/17 84.6 kg (186 lb 6.4 oz)               Primary Care Provider Office Phone # Fax #    Jody Blackburn [phone] [address] [facility] [address]        Equal Access to Services     [facility]: Hadkatina Baker, corinne soto, carlos ramos. So [facility] [phone].    ATENCIÓN: Si habla español, tiene a evans disposición servicios gratuitos de asistencia lingüística. Marco al [phone].    We comply with applicable federal civil rights laws and [place] laws. We do not discriminate on the " basis of race, color, national origin, age, disability, sex, sexual orientation, or gender identity.            Thank you!     Thank you for choosing [facility] [place]  for your care. Our goal is always to provide you with excellent care. Hearing back from our patients is one way we can continue to improve our services. Please take a few minutes to complete the written survey that you may receive in the mail after your visit with us. Thank you!             Your Updated Medication List - Protect others around you: Learn how to safely use, store and throw away your medicines at www.disposemymeds.org.          This list is accurate as of: 10/24/17  1:06 PM.  Always use your most recent med list.                   Brand Name Dispense Instructions for use Diagnosis    aspirin 81 MG EC tablet     90 tablet    Take 1 tablet (81 mg) by mouth daily Start tomorrow morning.    Coronary artery disease involving native heart, angina presence unspecified, unspecified vessel or lesion type, Status post coronary angioplasty       clopidogrel 75 MG tablet    PLAVIX    90 tablet    Take 1 tablet (75 mg) by mouth daily    Coronary artery disease involving native heart, angina presence unspecified, unspecified vessel or lesion type, Status post coronary angioplasty       ibuprofen 200 MG tablet    ADVIL/MOTRIN     Take 200 mg by mouth every 4 hours as needed for mild pain        lisinopril 10 MG tablet    PRINIVIL/ZESTRIL    90 tablet    Take 1 tablet (10 mg) by mouth daily    Benign essential hypertension       metoprolol 50 MG 24 hr tablet    TOPROL-XL    90 tablet    Take 1 tablet (50 mg) by mouth daily    Benign essential hypertension       simvastatin 40 MG tablet    ZOCOR    90 tablet    Take 1 tablet (40 mg) by mouth At Bedtime    Hyperlipidemia LDL goal <160

## 2017-10-24 NOTE — PROGRESS NOTES
HPI and Plan: #[id number]  See dictation    Orders Placed This Encounter   Procedures     Basic metabolic panel     Lipid Profile     ALT     Follow-Up with Cardiac Advanced Practice Provider       Orders Placed This Encounter   Medications     ibuprofen (ADVIL/MOTRIN) 200 MG tablet     Sig: Take 200 mg by mouth every 4 hours as needed for mild pain       Medications Discontinued During This Encounter   Medication Reason     aspirin EC 81 MG EC tablet Medication Reconciliation Clean Up         Encounter Diagnosis   Name Primary?     Coronary artery disease involving native coronary artery of native heart without angina pectoris Yes       CURRENT MEDICATIONS:  Current Outpatient Prescriptions   Medication Sig Dispense Refill     ibuprofen (ADVIL/MOTRIN) 200 MG tablet Take 200 mg by mouth every 4 hours as needed for mild pain       aspirin 81 MG EC tablet Take 1 tablet (81 mg) by mouth daily Start tomorrow morning. 90 tablet 3     clopidogrel (PLAVIX) 75 MG tablet Take 1 tablet (75 mg) by mouth daily 90 tablet 3     lisinopril (PRINIVIL/ZESTRIL) 10 MG tablet Take 1 tablet (10 mg) by mouth daily 90 tablet 3     simvastatin (ZOCOR) 40 MG tablet Take 1 tablet (40 mg) by mouth At Bedtime 90 tablet 2     metoprolol (TOPROL-XL) 50 MG 24 hr tablet Take 1 tablet (50 mg) by mouth daily 90 tablet 2       ALLERGIES     Allergies   Allergen Reactions     Penicillins        PAST MEDICAL HISTORY:  Past Medical History:   Diagnosis Date     Campylobacter diarrhea 3/19/2012     DISH (diffuse idiopathic skeletal hyperostosis) 9/2/2016     Hyperlipidemia LDL goal <130 4/25/2017     Inflammatory arthritis 3/14/2012     Joint swelling 9/12/2013     Seborrheic keratosis 9/2/2016       PAST SURGICAL HISTORY:  Past Surgical History:   Procedure Laterality Date     HC LEFT HEART CATHETERIZATION  10/10/2017    percutaneous coronary intervention first diagonal branch of LAD: 32 x 2.25 mm length everolimus eluting Promus premiere stent       FAMILY  "HISTORY:  Family History   Problem Relation Age of Onset     HEART DISEASE Mother      Aneurysm Father      Other - See Comments Father      bipass in kidney area     Hypertension Brother        SOCIAL HISTORY:  Social History     Social History     Marital status:      Spouse name: N/A     Number of children: N/A     Years of education: N/A     Social History Main Topics     Smoking status: Never Smoker     Smokeless tobacco: Never Used     Alcohol use 0.0 oz/week     0 Standard drinks or equivalent per week      Comment: 15 beers per week     Drug use: No     Sexual activity: Yes     Partners: Female     Other Topics Concern     None     Social History Narrative       Review of Systems:  Skin:  Negative       Eyes:  Positive for glasses reading  ENT:  Negative      Respiratory:  Negative       Cardiovascular:    Positive for;lightheadedness;fatigue when getting up quickly occ  Gastroenterology: Positive for excessive gas or bloating    Genitourinary:  Negative      Musculoskeletal:  Positive for joint pain;back pain;arthritis;foot pain right foot into ankle; has brusitis in L shoulder,  has arthritis in spine; possible gout per pt  Neurologic:  Negative      Psychiatric:  Negative      Heme/Lymph/Imm:  Negative      Endocrine:  Negative        Physical Exam:  Vitals: /76 (BP Location: Right arm, Patient Position: Chair, Cuff Size: Adult Regular)  Pulse 88  Ht 1.803 m (5' 11\")  Wt 83.3 kg (183 lb 9.6 oz)  BMI 25.61 kg/m2    Constitutional:  cooperative;well developed        Skin:  warm and dry to the touch        Head:  normocephalic        Eyes:  pupils equal and round        ENT:  no pallor or cyanosis        Neck:  JVP normal        Chest:  clear to auscultation;normal respiratory excursion          Cardiac: regular rhythm;normal S1 and S2     no presence of murmur            Abdomen:  abdomen soft        Vascular: pulses full and equal                                        Extremities and " Back:  no deformities, clubbing, cyanosis, erythema observed;no edema              Neurological:  affect appropriate, oriented to time, person and place;no gross motor deficits              CC  No referring provider defined for this encounter.

## 2017-10-24 NOTE — TELEPHONE ENCOUNTER
Reviewed lab results.  BMP is WNL  LDL now at goal  Continue current medical regimen  Pls call to review  ThanksDEBBY

## 2017-10-24 NOTE — PROGRESS NOTES
HISTORY OF PRESENT ILLNESS:  This is a 57-year-old male who presents to [facility] today for a followup visit.  He is a patient of Dr. Tejada's seen in our clinic for coronary artery disease, hypertension and hyperlipidemia.      Kevin has a strong family history of coronary artery disease.  He was experiencing some atypical-type left shoulder pain and recently was started on blood pressure medications due to hypertension.  He underwent a stress echocardiogram demonstrating inferior lateral hypokinesia.  He met with Dr. Tejada, aspirin was started and lisinopril was added due to persistent elevated blood pressure readings.  He was set up for coronary angiography on 10/10/2017.  This demonstrated is subtotal diagonal vessel.  He received a 2.25 x 32 mm drug-eluting stent reestablishing good flow, mild LAD disease, 70% mid right coronary artery disease.  Of note, he did have separate orifices for his LAD and circumflex.  There were no procedural complications.  He was started on Plavix.  He returns today for reassessment.      Kevin overall tells me he is doing well.  He actually has more energy.  He is doing his usual activities without any limitations.  He is no longer having any left shoulder pain.  Kevin denies any palpitations, lightheadedness, dizziness, orthopnea or peripheral edema.  He did come in fasting today.  He is planning on starting cardiac rehab Phase II tomorrow.      PHYSICAL EXAMINATION:  His blood pressure today is 138/76 mmHg, heart rate 88 beats per minute and is regular.  His lungs are clear.  There is no peripheral edema.  Right radial puncture site without bleeding, hematoma or bruit.  His weight is down 3 pounds.      IMPRESSION AND PLAN:   1.  Coronary artery disease.  Recent evidence of a subtotal diagonal vessel, which was successfully revascularized with a drug-eluting stent.  He does have 70% remaining disease in the mid portion of the small  dominant circumflex, otherwise mild LAD disease.  He is free from any angina symptoms and doing well in followup.  Will continue Plavix for 1 year, beta blockade and aspirin indefinitely, and ACE inhibitor therapy.   2.  Hypertension.  Blood pressure borderline today.  At this time, he has agreed to increase his metoprolol extended-release to 75 mg.  We will have him return in 1 month for reassessment.   3.  Hyperlipidemia.  It has been over a year since a cholesterol panel has been drawn.  He came in fasting today.  We will have him do this on the way out and review the results with him over the phone.      Thank you for allowing me to participate in this patient's care.         FABIAN APARICIO, CNP             D: 10/24/2017 13:16   T: 10/24/2017 15:18   MT: RAEANN      Name:     BERNICE MAHER   MRN:      [id number]        Account:      [id number]   :      1960           Service Date: 10/24/2017      Document: [id number]

## 2017-10-24 NOTE — PATIENT INSTRUCTIONS
Start cardiac rehab    Increase Metoprolol XR to 75mg at night  Continue taking the other medications as you are doing  Get labs drawn today.  We will call you with the results  Return in 1 month

## 2017-10-25 NOTE — TELEPHONE ENCOUNTER
Lab results and Tameka's recommendations were reviewed with patient over the phone. patient had no questions.      Cuco WARREN  [facility]

## 2017-10-26 ENCOUNTER — HOSPITAL ENCOUNTER (OUTPATIENT)
Dept: CARDIAC REHAB | Facility: CLINIC | Age: 57
Setting detail: THERAPIES SERIES
End: 2017-10-26
Attending: INTERNAL MEDICINE
Payer: COMMERCIAL

## 2017-10-26 VITALS — WEIGHT: 183.4 LBS | HEIGHT: 71 IN | BODY MASS INDEX: 25.68 KG/M2

## 2017-10-26 DIAGNOSIS — Z98.61 STATUS POST CORONARY ANGIOPLASTY: ICD-10-CM

## 2017-10-26 DIAGNOSIS — I25.10 CORONARY ARTERY DISEASE INVOLVING NATIVE HEART, ANGINA PRESENCE UNSPECIFIED, UNSPECIFIED VESSEL OR LESION TYPE: ICD-10-CM

## 2017-10-26 PROCEDURE — 40000575 ZZH STATISTIC OP CARDIAC VISIT #2

## 2017-10-26 PROCEDURE — 93797 PHYS/QHP OP CAR RHAB WO ECG: CPT

## 2017-10-26 PROCEDURE — 40000116 ZZH STATISTIC OP CR VISIT

## 2017-10-26 PROCEDURE — 93798 PHYS/QHP OP CAR RHAB W/ECG: CPT

## 2017-10-26 ASSESSMENT — 6 MINUTE WALK TEST (6MWT)
FEMALE CALC: 1728.79
PREDICTED: 2055.92
MALE CALC: 2043.46
GENDER SELECTION: MALE
TOTAL DISTANCE WALKED (FT): 1765

## 2017-10-26 NOTE — PROGRESS NOTES
Kevin Simeon  57 year old  Stent    10/26/17 0700   Session   Session Initial Evaluation and Exercise Prescription   Certified through this date 11/24/17   Cardiac Rehab Assessment    I have established, reviewed and made necessary changes to the individualized treatment plan and exercise prescription for this patient.    Physician Name (printed): ________________________   Date: _______  Time: ______    Physician Signature: ___________________________________________     Cardiac Rehab Assessment 10/26/17 Patient presents for initial evaluation of cardiac rehab s/p SHANNON to D1. Patient was experiencing left shoulder pain, which he initially related to shoulder injury. Symptoms did not improve with pain medication and patient underwent stress testing. Stent was placed 10-10-17 and patient reports he's been feeling back to normal. Normal EF of 55-60%. He does experience lightheadedness on occasion and was encouraged to drink plenty of water. He has returned to work full-time as an  without any complaints. He is a regular walker and typically achieves up to 15,000-20,000 steps per day. Patient is interested and motivated to attend rehab for monitored exercise and to benefit from nutritional counseling. Patient will greatly benefit from skilled therapy to meet 1:1 with dietician to cover heart healthy guidelines, to monitor CV response to exercise and to educate patient on home exercise guidelines to progress patient towards goals.    The patient's history and clinical status including hemodynamics and ECG were evaluated. The patient was assessed to be stable and appropriate to begin exercise.   The patient's functional capacity and exercise prescription were determined by the completion of the 6 minute walk test. See results below. The patient was oriented to the program.  Risk factor profile was completed. Goals and objectives were discussed. CV response was WNL. No symptoms, complaints or pain were reported. Good  prognosis for reaching goals below. Skilled therapy is necessary in order to monitor CV response to exercise, to provide education on risk factors and behavior change counseling needed to achieve patient's goals.  Plan to progress to 30-40 minutes of exercise prior to discharge from cardiac rehab.  Initial THR of 20-30 beats above RHR; Effort rating of 4-6. Initiate muscle conditioning as appropriate. Provide risk factor education and behavior change counseling.    General Information   Treatment Diagnosis Stent   Date of Treatment Diagnosis 10/10/17   Significant Past CV History None   Comorbidities None   Other Medical History gout in right foot, pain in shoulders    Lead up symptoms pain in left shoulder    Hospital Location [facility]   Hospital Discharge Date 10/26/17   Signs and Symptoms Post Hospital Discharge Dizziness;Fatigue   Outpatient Cardiac Rehab Start Date 10/26/17   Primary Physician Dr. Jody Blackburn   Primary Physician Follow Up Advised to schedule appointment   Surgeon NA   Surgeon Follow Up NA   Cardiologist Dr. Tejada   Cardiologist Follow Up Completed   Ejection Fraction 55-60%   Risk Stratification Low   Summary of Cath Report   Summary of Cath Report Available   Date Performed 10/10/17   Left Main nonexistent- there are separate orifices for both LAD and LCx   LAD 30%-99%  (stent)   LCX no narrowing   RCA 70% of small nondominant vessel   Cath Report Comments 10/26/17 Placement of SHANNON in first diagonal branch of LAD. No ther significant focal coronary narrowing   Living and Work Status    Living Arrangements and Social Status house   Support System Live with an adult   Return to Employment Yes   Occupation    Preventative Medications   CMS recommended medications Ace inhibitors;Antiplatelets;Beta Blocker;Lipid Lowering;Influenza vaccination   Falls Screen   Have you fallen two or more times in the past year? No   Have you fallen and had an injury in the past year? No   Pain   Patient  "Currently in Pain Yes   Pain Location right foot   Pain Rating 3/10   Pain Description Ache   Physical Assessments   Incisions WNL   Edema None   Right Lung Sounds not assessed   Left Lung Sounds not assessed   Limitations Orthopedic   Individualized Treatment Plan   Monitored Sessions Scheduled 12   Monitored Sessions Attended 1   Oxygen   Supplemental Oxygen needed No   Nutrition Management - Weight Management   Assessment Initial Assessment   Age 57   Weight 83.2 kg (183 lb 6.4 oz)   Height 1.803 m (5' 10.98\")   BMI (Calculated) 25.64   Initial Rate Your Plate Score. Dietary tool to assess eating patterns. Scores range from 24 to 72. The higher the score the healthier the eating pattern. 49   Nutrition Management - Lipids   Lipids Labs Available   Date 10/24/17   Total Cholesterol 121   Triglycerides 124   HDL 43   LDL 53   Prescribed Lipid Medication Yes   Statin Intensity High Intensity   Nutrition Management - Diabetes   Diabetes No   Nutrition Management Summary   Dietary Recommendations Low Fat;Low Cholesterol;Low Sodium   Stages of Change for Diet Compliance Preparation   Interventions Planned Refer for Individual Diet Consultation;Complete Food Diary;Instruct on Label Reading;Attend Nutrition Education Class(es)   Patient Goals Goal #1   Goal #1 Description Patient will complete dietary food log for 3 days and then will follow up for 1:1 with dietician.   Goal #1 Target Date 12/11/17   Nutrition Summary Comments 10/26/17 Patient eats plenty of fresh fruits and vegetables and mostly grills his meat. Patient admits he does not always pick lean meat choices. He is interested in completing a food log and meeting with dietician, see goal above.    Psychosocial Management   Psychosocial Assessment Initial   Is there history of clinical depression or increased risk of depression? No previous history   Current Level of Stress per Patient Report Mild   Current Coping Skills Uses Stress Management/Relaxation " Techniques;Has Positive Support System  (walking the dog)   Initial Patient Health Questionnaire -9 Score (PHQ-9) for depression. 5-9 Minimal symptoms, 10-14 Minor depression, 15-19 Major depression, moderately severe, > 20 Major depression, severe  4   Initial [facility] Survey score.  Quality of Life:   If total score > 25 review individual areas where patient rated a 4 or 5.  Consider patients current medical condition and what role that plays on the score.   Adjust treatment protocol to improve areas of concern.  Consider the following:  PHQ9 score, DASI, and re-assessment within the next 30 days to assist with developing treatments.  20   Stages of Change Preparation   Interventions Planned Patient to verbalize understanding of behavioral assessment results;Patient to verbalize understanding of negative impact of stress to personal health   Patient Goal No   Psychosocial Comments 10/26/17 Patient admits work can be stressful at times. He does not have a formal stress management goal at this time, but plans to work on incorporating more prayer into his day as well as possibly meditating.    Other Core Components - Hypertension   History of or Diagnosis of Hypertension Yes   Currently taking Anti-Hypertensives Yes;Beta blocker;Ace Inhibitor   Other Core Components - Tobacco   History of Tobacco Use Never   Other Core Components Summary   Interventions Planned Instruct patient on the DASH diet;Attend education class on Blood Pressure;Attend education class(es) on Nutrition   Activity/Exercise History   Activity/Exercise Assessment Initial   Activity/Exercise Status prior to event? Was Physically Active;Participated in an Exercise Program   Number of Days Currently participating in Moderate Physical Activity? 7   Number of Days Currently performing  Aerobic Exercise (including rehab)? 7   Number of Minutes per Session Currently of Aerobic Exercise (average)? 40-60   Current Stage of Change (Physical Activity)  Maintenance   Current Stage of Change (Aerobic Exercise) Maintenance   Activity/Exercise Comments 10/26/17 Patient walks 20+ minutes every morning and evening. He increases his walking up to 60 minutes on weekends. He achieves 12,000-20,000 steps per day and reports he has returned to these levels. Patient is very physically active both at work and at home.   Exercise Assessment   6 Minute Walk Predicted - Gender Selection Male   6 Minute Walk Predicted (Male) 2043.46   6 Minute Walk Predicted (Female) 1728.79   Initial 6 Minute Walk Distance (Feet) 1765 ft   Resting HR 76 bpm   Exercise  bpm   Post Exercise HR 77 bpm   Resting /62   Exercise /68   Post Exercise /64   Effort Rating 4   Current MET Level 3.6   MET Level Goal 6-7   ECG Rhythm Normal sinus rhythm   Ectopy None   Current Symptoms Other (comments)  (foot pain related to gout)   Limitations/Restrictions Orthopedic (see comments)   Exercise Prescription   Mode Treadmill;Recumbant bike;Elliptical;Weights   Duration/Time 15-30 min   Frequency 2 days/week   THR (85% of age predicted max HR) 138.55   OMNI Effort Rating (0-10 Scale) 4-6/10   Progression Progress peak intensity by 1/2 MET per week;Total exercise time of 30-45 minutes   Recommended Home Exercise   Type of Exercise Walking   Frequency (days per week) 3-4   Duration (minutes per session) 15-30 min   Effort Rating Recommended 4-6/10   Current Home Exercise   Type of Exercise Walking   Frequency (days per week) 7   Duration (minutes per session) 40-60   Follow-up/On-going Support   Provider follow-up needed on the following No follow-up needed   Learning Assessment   Learner Patient   Primary Language English   Preferred Learning Style Listening;Reading   Barriers to Learning No barriers noted   Patient Education   Education recommended Anatomy and Physiology of the Heart;Blood Pressure;Exercise Principles;Nutrition;Medication Overview

## 2017-10-29 ENCOUNTER — MYC REFILL (OUTPATIENT)
Dept: CARDIOLOGY | Facility: CLINIC | Age: 57
End: 2017-10-29

## 2017-10-29 DIAGNOSIS — Z98.61 STATUS POST CORONARY ANGIOPLASTY: ICD-10-CM

## 2017-10-29 DIAGNOSIS — I25.10 CORONARY ARTERY DISEASE INVOLVING NATIVE HEART, ANGINA PRESENCE UNSPECIFIED, UNSPECIFIED VESSEL OR LESION TYPE: ICD-10-CM

## 2017-10-30 RX ORDER — CLOPIDOGREL BISULFATE 75 MG/1
75 TABLET ORAL DAILY
Qty: 90 TABLET | Refills: 3 | Status: SHIPPED | OUTPATIENT
Start: 2017-10-30 | End: 2018-10-26

## 2017-10-30 NOTE — TELEPHONE ENCOUNTER
Message from [facility] [employer]:  Original authorizing provider: MD Kevin Amanda would like a refill of the following medications:  clopidogrel (PLAVIX) 75 MG tablet [George Hodges MD]    Preferred pharmacy: [address]    Comment:  Please email a refill order to [facility] pharmacy, as I need a refill and will have to get this medication through my mail order pharmacy. Thank you. eKvin Simeon

## 2017-10-31 ENCOUNTER — HOSPITAL ENCOUNTER (OUTPATIENT)
Dept: CARDIAC REHAB | Facility: CLINIC | Age: 57
End: 2017-10-31
Attending: INTERNAL MEDICINE
Payer: COMMERCIAL

## 2017-10-31 PROCEDURE — 40000116 ZZH STATISTIC OP CR VISIT

## 2017-10-31 PROCEDURE — 93798 PHYS/QHP OP CAR RHAB W/ECG: CPT

## 2017-11-02 ENCOUNTER — HOSPITAL ENCOUNTER (OUTPATIENT)
Dept: CARDIAC REHAB | Facility: CLINIC | Age: 57
End: 2017-11-02
Attending: INTERNAL MEDICINE
Payer: COMMERCIAL

## 2017-11-02 PROCEDURE — 40000116 ZZH STATISTIC OP CR VISIT: Performed by: REHABILITATION PRACTITIONER

## 2017-11-02 PROCEDURE — 93798 PHYS/QHP OP CAR RHAB W/ECG: CPT | Performed by: REHABILITATION PRACTITIONER

## 2017-11-07 ENCOUNTER — HOSPITAL ENCOUNTER (OUTPATIENT)
Dept: CARDIAC REHAB | Facility: CLINIC | Age: 57
End: 2017-11-07
Attending: INTERNAL MEDICINE
Payer: COMMERCIAL

## 2017-11-07 PROCEDURE — 40000116 ZZH STATISTIC OP CR VISIT

## 2017-11-07 PROCEDURE — 93798 PHYS/QHP OP CAR RHAB W/ECG: CPT

## 2017-11-07 ASSESSMENT — 6 MINUTE WALK TEST (6MWT)
TOTAL DISTANCE WALKED (FT): 1765
GENDER SELECTION: MALE

## 2017-11-07 NOTE — PROGRESS NOTES
Kevin Simeon  57 year old  DX: stent 11/07/17 1500   Physician cosignature/electronic signature indicates approval of this ITP document. I have established, reviewed and made necessary changes to the individualized treatment plan and exercise prescription for this patient.   Session 30 Day Individualized Treatment Plan  (ITP forwarded for medical director review.)   Certified through this date 12/09/17   Cardiac Rehab Assessment   Cardiac Rehab Assessment 10/26/17 Patient presents for initial evaluation of cardiac rehab s/p SHANNON to D1. Patient was experiencing left shoulder pain, which he initially related to shoulder injury. Symptoms did not improve with pain medication and patient underwent stress testing. Stent was placed 10-10-17 and patient reports he's been feeling back to normal. He does experience lightheadedness on occasion and was encouraged to drink plenty of water. He has returned to work full-time as an  without any complaints. He is a regular walker and typically achieves up to 15,000-20,000 steps per day. Patient is interested and motivated to attend rehab for monitored exercise and to benefit from nutritional counseling. Patient will greatly benefit from skilled therapy to meet 1:1 with dietician to cover heart healthy guidelines, to monitor CV response to exercise and to educate patient on home exercise guidelines to progress patient towards goals.  11/7/17. No change in POC at this time.  ITP forwarded.   General Information   Treatment Diagnosis Stent   Date of Treatment Diagnosis 10/10/17   Significant Past CV History None   Comorbidities None   Other Medical History gout in right foot, pain in shoulders    Lead up symptoms pain in left shoulder    Hospital Location [facility]   Hospital Discharge Date 10/26/17   Signs and Symptoms Post Hospital Discharge Dizziness;Fatigue   Outpatient Cardiac Rehab Start Date 10/26/17   Primary Physician Dr. Jody Blackburn   Primary Physician Follow Up Advised to  schedule appointment   Surgeon NA   Surgeon Follow Up NA   Cardiologist Dr. Tejada   Cardiologist Follow Up Completed   Ejection Fraction 55-60%   Risk Stratification Low   Summary of Cath Report   Summary of Cath Report Available   Date Performed 10/10/17   Left Main nonexistent- there are separate orifices for both LAD and LCx   LAD 30%-99%  (stent)   LCX no narrowing   RCA 70% of small nondominant vessel   Cath Report Comments 10/26/17 Placement of SHANNON in first diagonal branch of LAD. No ther significant focal coronary narrowing   Living and Work Status    Living Arrangements and Social Status house   Support System Live with an adult   Return to Employment Yes   Occupation    Preventative Medications   CMS recommended medications Ace inhibitors;Antiplatelets;Beta Blocker;Lipid Lowering;Influenza vaccination   Falls Screen   Have you fallen two or more times in the past year? No   Have you fallen and had an injury in the past year? No   Pain   Patient Currently in Pain Yes   Pain Location right foot   Pain Rating 3/10   Pain Description Ache   Physical Assessments   Incisions WNL   Edema None   Right Lung Sounds not assessed   Left Lung Sounds not assessed   Limitations Orthopedic   Individualized Treatment Plan   Monitored Sessions Scheduled 12   Monitored Sessions Attended 1   Oxygen   Supplemental Oxygen needed No   Nutrition Management - Weight Management   Assessment Initial Assessment   Age 57   Initial Rate Your Plate Score. Dietary tool to assess eating patterns. Scores range from 24 to 72. The higher the score the healthier the eating pattern. 49   Nutrition Management - Lipids   Lipids Labs Available   Date 10/24/17   Total Cholesterol 121   Triglycerides 124   HDL 43   LDL 53   Prescribed Lipid Medication Yes   Statin Intensity High Intensity   Nutrition Management - Diabetes   Diabetes No   Nutrition Management Summary   Dietary Recommendations Low Fat;Low Cholesterol;Low Sodium   Stages of  Change for Diet Compliance Preparation   Interventions Planned Refer for Individual Diet Consultation;Complete Food Diary;Instruct on Label Reading;Attend Nutrition Education Class(es)   Patient Goals Goal #1   Goal #1 Description Patient will complete dietary food log for 3 days and then will follow up for 1:1 with dietician.   Goal #1 Target Date 12/11/17   Nutrition Summary Comments 10/26/17 Patient eats plenty of fresh fruits and vegetables and mostly grills his meat. Patient admits he does not always pick lean meat choices. He is interested in completing a food log and meeting with dietician, see goal above.    Psychosocial Management   Psychosocial Assessment Initial   Is there history of clinical depression or increased risk of depression? No previous history   Current Level of Stress per Patient Report Mild   Current Coping Skills Uses Stress Management/Relaxation Techniques;Has Positive Support System  (walking the dog)   Initial Patient Health Questionnaire -9 Score (PHQ-9) for depression. 5-9 Minimal symptoms, 10-14 Minor depression, 15-19 Major depression, moderately severe, > 20 Major depression, severe  4   Initial [facility] Survey score.  Quality of Life:   If total score > 25 review individual areas where patient rated a 4 or 5.  Consider patients current medical condition and what role that plays on the score.   Adjust treatment protocol to improve areas of concern.  Consider the following:  PHQ9 score, DASI, and re-assessment within the next 30 days to assist with developing treatments.  20   Stages of Change Preparation   Interventions Planned Patient to verbalize understanding of behavioral assessment results;Patient to verbalize understanding of negative impact of stress to personal health   Patient Goal No   Psychosocial Comments 10/26/17 Patient admits work can be stressful at times. He does not have a formal stress management goal at this time, but plans to work on incorporating more  prayer into his day as well as possibly meditating.    Other Core Components - Hypertension   History of or Diagnosis of Hypertension Yes   Currently taking Anti-Hypertensives Yes;Beta blocker;Ace Inhibitor   Other Core Components - Tobacco   History of Tobacco Use Never   Other Core Components Summary   Interventions Planned Instruct patient on the DASH diet;Attend education class on Blood Pressure;Attend education class(es) on Nutrition   Activity/Exercise History   Activity/Exercise Assessment Initial   Activity/Exercise Status prior to event? Was Physically Active;Participated in an Exercise Program   Number of Days Currently participating in Moderate Physical Activity? 7   Number of Days Currently performing  Aerobic Exercise (including rehab)? 7   Number of Minutes per Session Currently of Aerobic Exercise (average)? 40-60   Current Stage of Change (Physical Activity) Maintenance   Current Stage of Change (Aerobic Exercise) Maintenance   Activity/Exercise Comments 10/26/17 Patient walks 20+ minutes every morning and evening. He increases his walking up to 60 minutes on weekends. He achieves 12,000-20,000 steps per day and reports he has returned to these levels. Patient is very physically active both at work and at home.   Exercise Assessment   6 Minute Walk Predicted - Gender Selection Male   Initial 6 Minute Walk Distance (Feet) 1765 ft   Resting HR 76 bpm   Exercise  bpm   Post Exercise HR 77 bpm   Resting /62   Exercise /68   Post Exercise /64   Effort Rating 4   Current MET Level 3.6   MET Level Goal 6-7   ECG Rhythm Normal sinus rhythm   Ectopy None   Current Symptoms Other (comments)  (foot pain related to gout)   Limitations/Restrictions Orthopedic (see comments)   Exercise Prescription   Mode Treadmill;Recumbant bike;Elliptical;Weights   Duration/Time 15-30 min   Frequency 2 days/week   THR (85% of age predicted max HR) 138.55   OMNI Effort Rating (0-10 Scale) 4-6/10    Progression Progress peak intensity by 1/2 MET per week;Total exercise time of 30-45 minutes   Recommended Home Exercise   Type of Exercise Walking   Frequency (days per week) 3-4   Duration (minutes per session) 15-30 min   Effort Rating Recommended 4-6/10   Current Home Exercise   Type of Exercise Walking   Frequency (days per week) 7   Duration (minutes per session) 40-60   Follow-up/On-going Support   Provider follow-up needed on the following No follow-up needed   Learning Assessment   Learner Patient   Primary Language English   Preferred Learning Style Listening;Reading   Barriers to Learning No barriers noted   Patient Education   Education recommended Anatomy and Physiology of the Heart;Blood Pressure;Exercise Principles;Nutrition;Medication Overview

## 2017-11-09 ENCOUNTER — HOSPITAL ENCOUNTER (OUTPATIENT)
Dept: CARDIAC REHAB | Facility: CLINIC | Age: 57
End: 2017-11-09
Attending: INTERNAL MEDICINE
Payer: COMMERCIAL

## 2017-11-09 PROCEDURE — 93798 PHYS/QHP OP CAR RHAB W/ECG: CPT

## 2017-11-09 PROCEDURE — 40000116 ZZH STATISTIC OP CR VISIT

## 2017-11-14 ENCOUNTER — TELEPHONE (OUTPATIENT)
Dept: CARDIAC REHAB | Facility: CLINIC | Age: 57
End: 2017-11-14

## 2017-11-14 ENCOUNTER — HOSPITAL ENCOUNTER (OUTPATIENT)
Dept: CARDIAC REHAB | Facility: CLINIC | Age: 57
End: 2017-11-14
Attending: INTERNAL MEDICINE
Payer: COMMERCIAL

## 2017-11-14 VITALS — BODY MASS INDEX: 25.15 KG/M2 | WEIGHT: 179.6 LBS | HEIGHT: 71 IN

## 2017-11-14 DIAGNOSIS — I10 BENIGN ESSENTIAL HYPERTENSION: ICD-10-CM

## 2017-11-14 PROCEDURE — 93798 PHYS/QHP OP CAR RHAB W/ECG: CPT

## 2017-11-14 PROCEDURE — 40000116 ZZH STATISTIC OP CR VISIT

## 2017-11-14 ASSESSMENT — 6 MINUTE WALK TEST (6MWT)
GENDER SELECTION: MALE
MALE CALC: 2053.43
PREDICTED: 2065.95
FEMALE CALC: 1741.76
TOTAL DISTANCE WALKED (FT): 1765

## 2017-11-14 NOTE — PROGRESS NOTES
Kevin Simeon  57 year old  Stent   11/14/17 1600   Session   Session 60 Day Individualized Treatment Plan   Certified through this date 01/06/18   Cardiac Rehab Assessment    Physician cosignature/electronic signature indicates approval of this ITP document. I have established, reviewed and made necessary changes to the individualized treatment plan and exercise prescription for this patient.   Cardiac Rehab Assessment 10/26/17 Patient presents for initial evaluation of cardiac rehab s/p SHANNON to D1. Patient was experiencing left shoulder pain, which he initially related to shoulder injury. Symptoms did not improve with pain medication and patient underwent stress testing. Stent was placed 10-10-17 and patient reports he's been feeling back to normal. He does experience lightheadedness on occasion and was encouraged to drink plenty of water. He has returned to work full-time as an  without any complaints. He is a regular walker and typically achieves up to 15,000-20,000 steps per day. Patient is interested and motivated to attend rehab for monitored exercise and to benefit from nutritional counseling. Patient will greatly benefit from skilled therapy to meet 1:1 with dietician to cover heart healthy guidelines, to monitor CV response to exercise and to educate patient on home exercise guidelines to progress patient towards goals.  11/7/17. No change in POC at this time.  ITP forwarded. 11/14/17 PT is making great progress in cardiac rehab and currently tolerates a MET level of 4.4. He is walking daily up to 60 minutes. He has noticed his gout continues to be an issue for him and will considering contacting his primary care MD regarding the issue. Patient is progressing nicely towards goals and plans to meet with dietician tomorrow for a private consultation. He has been tracking his dietary intake for the past 3 weeks and will discuss this with the dietician. Patient continues to benefit from skilled therapy to  monitor CV response to exercise to educate on risk management, to provide heart healthy dietary education and to provide exercise guidelines to patient.   General Information   Treatment Diagnosis Stent   Date of Treatment Diagnosis 10/10/17   Significant Past CV History None   Comorbidities None   Other Medical History gout in right foot, pain in shoulders    Lead up symptoms pain in left shoulder    Hospital Location [facility]   Hospital Discharge Date 10/26/17   Signs and Symptoms Post Hospital Discharge Dizziness;Fatigue   Outpatient Cardiac Rehab Start Date 10/26/17   Primary Physician Dr. Jody Blackburn   Primary Physician Follow Up Advised to schedule appointment   Surgeon NA   Surgeon Follow Up NA   Cardiologist Dr. Tejada   Cardiologist Follow Up Completed   Ejection Fraction 55-60%   Risk Stratification Low   Summary of Cath Report   Summary of Cath Report Available   Date Performed 10/10/17   Left Main nonexistent- there are separate orifices for both LAD and LCx   LAD 30%-99%  (stent)   LCX no narrowing   RCA 70% of small nondominant vessel   Cath Report Comments 10/26/17 Placement of SHANNON in first diagonal branch of LAD. No ther significant focal coronary narrowing   Living and Work Status    Living Arrangements and Social Status house   Support System Live with an adult   Return to Employment Yes   Occupation    Preventative Medications   CMS recommended medications Ace inhibitors;Antiplatelets;Beta Blocker;Lipid Lowering;Influenza vaccination   Falls Screen   Have you fallen two or more times in the past year? No   Have you fallen and had an injury in the past year? No   Pain   Patient Currently in Pain Yes   Pain Location right foot   Pain Rating 3/10   Pain Description Ache   Pain Description Comment 11/14/17 No change in gout pain. PT considering talking to primary care MD about this.   Physical Assessments   Incisions WNL   Edema None   Right Lung Sounds not assessed   Left Lung Sounds not assessed  "  Limitations Orthopedic   Individualized Treatment Plan   Monitored Sessions Scheduled 12   Monitored Sessions Attended 6   Oxygen   Supplemental Oxygen needed No   Nutrition Management - Weight Management   Assessment Re-assessment   Age 57   Weight 81.5 kg (179 lb 9.6 oz)   Height 1.803 m (5' 10.98\")   BMI (Calculated) 25.11   Initial Rate Your Plate Score. Dietary tool to assess eating patterns. Scores range from 24 to 72. The higher the score the healthier the eating pattern. 49   Nutrition Management - Lipids   Lipids Labs Available   Date 10/24/17   Total Cholesterol 121   Triglycerides 124   HDL 43   LDL 53   Prescribed Lipid Medication Yes   Statin Intensity High Intensity   Nutrition Management - Diabetes   Diabetes No   Nutrition Management Summary   Dietary Recommendations Low Fat;Low Cholesterol;Low Sodium   Stages of Change for Diet Compliance Preparation   Interventions Planned Refer for Individual Diet Consultation;Complete Food Diary;Instruct on Label Reading;Attend Nutrition Education Class(es)   Patient Goals Goal #1   Goal #1 Description Patient will complete dietary food log for 3 days and then will follow up for 1:1 with dietician.   Goal #1 Target Date 12/11/17   Goal #1 Progress Towards Goal 11/14/17 PT has been tracking dietary intake the last 3 weeks to prepare for dietary consult tomorrow. He has a list of questions prepared for the appointment.   Nutrition Summary Comments 10/26/17 Patient eats plenty of fresh fruits and vegetables and mostly grills his meat. Patient admits he does not always pick lean meat choices. He is interested in completing a food log and meeting with dietician, see goal above.    Psychosocial Management   Psychosocial Assessment Re-assessment   Is there history of clinical depression or increased risk of depression? No previous history   Current Level of Stress per Patient Report Mild   Current Coping Skills Uses Stress Management/Relaxation Techniques;Has Positive " Support System  (walking the dog)   Initial Patient Health Questionnaire -9 Score (PHQ-9) for depression. 5-9 Minimal symptoms, 10-14 Minor depression, 15-19 Major depression, moderately severe, > 20 Major depression, severe  4   Initial [facility] [place] Survey score.  Quality of Life:   If total score > 25 review individual areas where patient rated a 4 or 5.  Consider patients current medical condition and what role that plays on the score.   Adjust treatment protocol to improve areas of concern.  Consider the following:  PHQ9 score, DASI, and re-assessment within the next 30 days to assist with developing treatments.  20   Stages of Change Preparation   Interventions Planned Patient to verbalize understanding of behavioral assessment results;Patient to verbalize understanding of negative impact of stress to personal health   Patient Goal No   Psychosocial Comments 10/26/17 Patient admits work can be stressful at times. He does not have a formal stress management goal at this time, but plans to work on incorporating more prayer into his day as well as possibly meditating. 11/14/17 PT continues to use stress management skills as needed to relieve work related stress.   Psychosocial Target Outcome Maximize coping skills   Other Core Components - Hypertension   History of or Diagnosis of Hypertension Yes   Currently taking Anti-Hypertensives Yes;Beta blocker;Ace Inhibitor   Other Core Components - Tobacco   History of Tobacco Use Never   Other Core Components Summary   Interventions Planned Instruct patient on the DASH diet;Attend education class on Blood Pressure;Attend education class(es) on Nutrition   Activity/Exercise History   Activity/Exercise Assessment Re-assessment   Activity/Exercise Status prior to event? Was Physically Active;Participated in an Exercise Program   Number of Days Currently participating in Moderate Physical Activity? 7   Number of Days Currently performing  Aerobic Exercise (including  rehab)? 7   Number of Minutes per Session Currently of Aerobic Exercise (average)? 50-60   Current Stage of Change (Physical Activity) Maintenance   Current Stage of Change (Aerobic Exercise) Maintenance   Activity/Exercise Comments 10/26/17 Patient walks 20+ minutes every morning and evening. He increases his walking up to 60 minutes on weekends. He achieves 12,000-20,000 steps per day and reports he has returned to these levels. Patient is very physically active both at work and at home. 11/14/17 PT is maintaining his physically active routine of walking 12,000-20,000 steps daily. He walks 25 minutes in the morning and again in the evening.    Exercise Assessment   6 Minute Walk Predicted - Gender Selection Male   6 Minute Walk Predicted (Male) 2053.43   6 Minute Walk Predicted (Female) 1741.76   Initial 6 Minute Walk Distance (Feet) 1765 ft   Resting HR 97 bpm   Exercise  bpm   Post Exercise  bpm   Resting /60   Exercise /72   Post Exercise /68   Effort Rating 4   Current MET Level 4.4   MET Level Goal 6-7   ECG Rhythm Normal sinus rhythm;Sinus tachycardia   Ectopy None   Current Symptoms Other (comments)  (foot pain related to gout)   Limitations/Restrictions Orthopedic (see comments)   Exercise Prescription   Mode Treadmill;Recumbant bike;Elliptical;Weights   Duration/Time 15-30 min   Frequency 2 days/week   THR (85% of age predicted max HR) 138.55   OMNI Effort Rating (0-10 Scale) 4-6/10   Progression Progress peak intensity by 1/2 MET per week;Total exercise time of 30-45 minutes   Recommended Home Exercise   Type of Exercise Walking   Frequency (days per week) 3-4   Duration (minutes per session) 45-60 min aerobic exercise   Effort Rating Recommended 4-6/10   Current Home Exercise   Type of Exercise Walking   Frequency (days per week) 7   Duration (minutes per session) 50-60   Follow-up/On-going Support   Provider follow-up needed on the following Heart Rate   Comments 11/14/17  Messaged Dr. Tejada regarding high resting HR and timing of Metoprolol dose.    Learning Assessment   Learner Patient   Primary Language English   Preferred Learning Style Listening;Reading   Barriers to Learning No barriers noted   Patient Education   Education recommended Anatomy and Physiology of the Heart;Blood Pressure;Exercise Principles;Nutrition;Medication Overview   Education Comments 11/14/17 PT mostly interested in nutrition education. He will not be able to attend other education classes because of his work schedule.

## 2017-11-14 NOTE — TELEPHONE ENCOUNTER
This message is regarding one of Dr. Tejada's patients that currently attends cardiac rehab. Patient's resting heart rate has varied from 102-107 BPM at rest and typically is 109-115 BPM after exercise. He currently takes 75 mg of metoprolol in the evening and does not exercise with us until 4:30 pm. Patient is wondering if he should switch the timing of this medication. Please let us know your recommendations.    Tariq Herrera   Cardiac Rehab Therapist

## 2017-11-14 NOTE — CONSULTS
NUTRITION ASSESSMENT & EDUCATION NOTE    REASON FOR ASSESSMENT  Kevin Simeon is a 57 year old male seen by Registered Dietitian for 1:1 Cardiac Rehab consult  - Stent placed 10/10/17     Nutrition History    Information obtained from patient    Patient has been instructed to follow the TLC, Mediterranean, low sodium, low saturated fat  Typical intake as follows:   - Breakfast: 3 cups black tea. Banana or fruit. Has been eating some cereal in the morning (cinnamon toast crunch, homemade granola, shredded wheat).    - Lunch:  Subway, DQ, Chinese, Pizza Ranch. Eats out everyday.    - Dinner: Kale salad mix from [employer], chicken cordon celeste (2). A lot of soups this time of year - white chili or chicken noodle w/ rotisserie chicken. Chicken breast meat, Naan bread w/ olive oil, canned gravy. Broccoli, green beans. Schwans - frozen veggies. 6 oz red meat 1x weekly.  [place] on grill. From [employer] - Pot Pies   - Snacks: not a big snacker. Popcorn (loaded w/ butter/salt, 1x monthly), popsicle, sometimes candy.    - Fluids: water, tea (feliz grey - uses one tea bag and makes 4-5 cups), beer 3 -4x weekly, sometimes drinks root beer, no juice.     Patient has attended no nutrition classes offered by cardiac rehab    Eats out once every two weeks or so - [place]. Steak     Changes to diet since cardiac event: fair amount of fruit    Barriers to dietary changes: gout, avoids tomatoes, potatoes, eggplant due to [place] intolerance. Sweet potatoes and yams OK. White sauce ok. Diet recall contains high amounts of Na.      Works as an , WORKING OUT WORKS.      Motivated to make changes, mother had a heart transplant.     Goals Include: putting cardiac guidelines together to form a healthier diet.       NUTRITION DIAGNOSIS  Food- and nutrition- related knowledge deficit related to cardiac diet guidelines as evidenced by diet recall high in Na.     INTERVENTIONS    Nutrition Prescription:  Cardiac diet: Low saturated fat, Na  <2400 mg  Fiber >25 g per day     Implementation    Assessed learning needs and learning preference.    Nutrition Education (Content):  a) Provided handouts:  a. Nutrition 1 slide show  b. Seasoning without salt  c. Nutrition facts for meat  d. Heart Healthy Recipe changes  e. Website info for Choose My Plate, Fruits and Veggies More Matters, American Heart Association, My fitness pal, Lose it.   b) Discussed TLC diet recommendations, including label reading, minimizing added salts/saturated fats/sugars, balanced meals TID, at least two food groups per snack, heart healthy substitutes, no trans fat    Nutrition Education (Application):  a) Discussed eating habits and recommended alternative food choices:  a. Emphasized fruits, vegetables, low sodium nuts/heart healthy fats, fish, low fat dairy  b. Reviewed recipes and new food ideas such as beans, lentils, vegetables and tofu.  c. Suggested other options for processed dinner meal (pot pies, cordon celeste)  d. Looked at [employer] menu to determine sodium/fat content of sandwich.   e. Reviewed fat/sat fat content of steak.     Goals/Follow up/Monitoring For Cardiac Rehab Staff    Adherence to nutrition related recommendations, follow with cardiac rehab    Additional questions/concerns related to TLC/heart healthy guidelines      Laisha Owusu RD, LD  [facility] Cardiac Rehab  Office: [phone]    DIRECT PATIENT CARE TIME: 60 MINUTES

## 2017-11-15 ENCOUNTER — HOSPITAL ENCOUNTER (OUTPATIENT)
Dept: CARDIAC REHAB | Facility: CLINIC | Age: 57
End: 2017-11-15
Attending: INTERNAL MEDICINE
Payer: COMMERCIAL

## 2017-11-15 RX ORDER — METOPROLOL SUCCINATE 50 MG/1
50 TABLET, EXTENDED RELEASE ORAL 2 TIMES DAILY
Qty: 180 TABLET | Refills: 3 | Status: SHIPPED | OUTPATIENT
Start: 2017-11-15 | End: 2017-12-12

## 2017-11-15 NOTE — TELEPHONE ENCOUNTER
Recommendations were reviewed with patient over the phone. Patient agreed to increase his Metoprolol XL from 75 mg a day to 50 mg BID. Patient had no questions. He will f/u as planned.     TGarbers RN  [facility]

## 2017-11-15 NOTE — TELEPHONE ENCOUNTER
Received message from Cardiac rehab: This message is regarding one of Dr. Tejada's patients that currently attends cardiac rehab. Patient's resting heart rate has varied from 102-107 BPM at rest and typically is 109-115 BPM after exercise. He currently takes 75 mg of metoprolol in the evening and does not exercise with us until 4:30 pm. Patient is wondering if he should switch the timing of this medication. Please let us know your recommendations.    BP Yesterday 11/14/17 at Cardiac rehab:   Resting /60   Exercise /72   Post Exercise /68       10/24/17 OV with Tameka Álvarez NP  1.  Coronary artery disease.  Recent evidence of a subtotal diagonal vessel, which was successfully revascularized with a drug-eluting stent.  He does have 70% remaining disease in the mid portion of the small dominant circumflex, otherwise mild LAD disease.  He is free from any angina symptoms and doing well in followup.  Will continue Plavix for 1 year, beta blockade and aspirin indefinitely, and ACE inhibitor therapy.    2.  Hypertension.  Blood pressure borderline today.  At this time, he has agreed to increase his metoprolol extended-release to 75 mg.  We will have him return in 1 month for reassessment.     Will route to Dr. Tejada and Tameka to review and make recommendations. Pt scheduled to see Tameka Álvarez 12/4/17.

## 2017-11-15 NOTE — TELEPHONE ENCOUNTER
Have patient increase metoprolol XR to 50mg BID  F/U as planned  Pls call pt to review  Thanks, DEBBY

## 2017-11-16 ENCOUNTER — HOSPITAL ENCOUNTER (OUTPATIENT)
Dept: CARDIAC REHAB | Facility: CLINIC | Age: 57
End: 2017-11-16
Attending: INTERNAL MEDICINE
Payer: COMMERCIAL

## 2017-11-16 PROCEDURE — 40000116 ZZH STATISTIC OP CR VISIT

## 2017-11-16 PROCEDURE — 93798 PHYS/QHP OP CAR RHAB W/ECG: CPT

## 2017-11-21 ENCOUNTER — HOSPITAL ENCOUNTER (OUTPATIENT)
Dept: CARDIAC REHAB | Facility: CLINIC | Age: 57
End: 2017-11-21
Attending: INTERNAL MEDICINE
Payer: COMMERCIAL

## 2017-11-21 PROCEDURE — 93798 PHYS/QHP OP CAR RHAB W/ECG: CPT

## 2017-11-21 PROCEDURE — 40000116 ZZH STATISTIC OP CR VISIT

## 2017-11-24 ENCOUNTER — HOSPITAL ENCOUNTER (OUTPATIENT)
Dept: CARDIAC REHAB | Facility: CLINIC | Age: 57
End: 2017-11-24
Attending: INTERNAL MEDICINE
Payer: COMMERCIAL

## 2017-11-24 PROCEDURE — 40000116 ZZH STATISTIC OP CR VISIT

## 2017-11-24 PROCEDURE — 93798 PHYS/QHP OP CAR RHAB W/ECG: CPT

## 2017-11-28 ENCOUNTER — HOSPITAL ENCOUNTER (OUTPATIENT)
Dept: CARDIAC REHAB | Facility: CLINIC | Age: 57
End: 2017-11-28
Attending: INTERNAL MEDICINE
Payer: COMMERCIAL

## 2017-11-28 PROCEDURE — 40000116 ZZH STATISTIC OP CR VISIT: Performed by: REHABILITATION PRACTITIONER

## 2017-11-28 PROCEDURE — 93798 PHYS/QHP OP CAR RHAB W/ECG: CPT | Performed by: REHABILITATION PRACTITIONER

## 2017-11-28 NOTE — ADDENDUM NOTE
Encounter addended by: Tariq Noel EP on: 11/28/2017  9:22 AM<BR>     Actions taken: Sign clinical note, Flowsheet accepted

## 2017-11-30 ENCOUNTER — HOSPITAL ENCOUNTER (OUTPATIENT)
Dept: CARDIAC REHAB | Facility: CLINIC | Age: 57
End: 2017-11-30
Attending: INTERNAL MEDICINE
Payer: COMMERCIAL

## 2017-11-30 PROCEDURE — 93798 PHYS/QHP OP CAR RHAB W/ECG: CPT

## 2017-11-30 PROCEDURE — 40000116 ZZH STATISTIC OP CR VISIT

## 2017-12-04 ENCOUNTER — OFFICE VISIT (OUTPATIENT)
Dept: CARDIOLOGY | Facility: CLINIC | Age: 57
End: 2017-12-04
Attending: NURSE PRACTITIONER
Payer: COMMERCIAL

## 2017-12-04 VITALS
WEIGHT: 183.4 LBS | HEIGHT: 71 IN | BODY MASS INDEX: 25.68 KG/M2 | DIASTOLIC BLOOD PRESSURE: 80 MMHG | HEART RATE: 84 BPM | SYSTOLIC BLOOD PRESSURE: 128 MMHG

## 2017-12-04 DIAGNOSIS — I25.10 CORONARY ARTERY DISEASE INVOLVING NATIVE CORONARY ARTERY OF NATIVE HEART WITHOUT ANGINA PECTORIS: ICD-10-CM

## 2017-12-04 PROCEDURE — 99214 OFFICE O/P EST MOD 30 MIN: CPT | Performed by: NURSE PRACTITIONER

## 2017-12-04 NOTE — LETTER
12/4/2017      Jody Blackburn [place]  [id number] [facility] [address]      RE: Kevin Simeon       Dear Colleague,    I had the pleasure of seeing Kevin Simeon in the [facility].    HISTORY OF PRESENT ILLNESS:  This is a 57-year-old male who presents to [facility] today for a followup visit.  He is a patient of Dr. Tejada's seen in our clinic for coronary artery disease, hypertension and hyperlipidemia.      Due to exertional shoulder pain, Kevin underwent a stress test which was abnormal.  This led to coronary angiography and evidence of a subtotal diagonal vessel.  He subsequently underwent stenting reestablishing good flow in October.  He was noted to have remaining 70% mid right coronary artery disease.  Of note, he does have 2 separate orifices of his LAD and circumflex.  Since then he has done well at cardiac rehab and has been faithful about taking his Plavix.  However, he was noted to have elevated systolic blood pressures, and when I saw him last month, I elected to increase his Toprol to 75 mg.  Cardiac rehab did contact us, and he was noted to have elevated heart rates and blood pressures, and therefore, I increased his Toprol to 50 mg twice a day.  He returns today for reassessment.      Kevin tells me he has been enjoying cardiac rehab and feels good exercising there.  He has not had any further exertional shoulder pain.  He is not short of breath.  He denies palpitations, lightheadedness, dizziness, orthopnea or peripheral edema.      PHYSICAL EXAMINATION:   VITAL SIGNS:  His blood pressure today is 124/80, heart rate 84 beats per minute and is regular.   LUNGS:  Clear.   EXTREMITIES:  There is no peripheral edema.      Kevin tells me he has had a fast resting heart rate for several years.  I reviewed cardiac rehab notes, and it appears appropriate heart rate and blood pressure response to activity.  He states his blood pressure has been much  better controlled there on the higher dose of Toprol.      IMPRESSION AND PLAN:   1.  Coronary artery disease.  Recent drug-eluting stent to a subtotal diagonal vessel after an abnormal stress test and atypical chest pain in October.  He is free from any angina symptoms and doing well in cardiac rehab.  We will continue beta blockade, aspirin, Plavix, ACE inhibitor therapy and a statin.   2.  Hypertension.  Blood pressure better controlled on the higher dose of Toprol.   3.  Hyperlipidemia.  Recent LDL level at goal.      Thank you for allowing me to participate in this patient's care.  We will have him return for followup with Dr. Tejada in 3 months.       Outpatient Encounter Prescriptions as of 12/4/2017   Medication Sig Dispense Refill     [DISCONTINUED] metoprolol (TOPROL-XL) 50 MG 24 hr tablet Take 1 tablet (50 mg) by mouth 2 times daily 180 tablet 3     clopidogrel (PLAVIX) 75 MG tablet Take 1 tablet (75 mg) by mouth daily 90 tablet 3     ibuprofen (ADVIL/MOTRIN) 200 MG tablet Take 200 mg by mouth every 4 hours as needed for mild pain       aspirin 81 MG EC tablet Take 1 tablet (81 mg) by mouth daily Start tomorrow morning. 90 tablet 3     lisinopril (PRINIVIL/ZESTRIL) 10 MG tablet Take 1 tablet (10 mg) by mouth daily 90 tablet 3     simvastatin (ZOCOR) 40 MG tablet Take 1 tablet (40 mg) by mouth At Bedtime 90 tablet 2     No facility-administered encounter medications on file as of 12/4/2017.        Again, thank you for allowing me to participate in the care of your patient.      Sincerely,    FABIAN Hurd [facility]

## 2017-12-04 NOTE — PROGRESS NOTES
HPI and Plan:   See dictation #[id number]    Orders Placed This Encounter   Procedures     Follow-Up with Cardiologist       No orders of the defined types were placed in this encounter.      There are no discontinued medications.      Encounter Diagnosis   Name Primary?     Coronary artery disease involving native coronary artery of native heart without angina pectoris        CURRENT MEDICATIONS:  Current Outpatient Prescriptions   Medication Sig Dispense Refill     metoprolol (TOPROL-XL) 50 MG 24 hr tablet Take 1 tablet (50 mg) by mouth 2 times daily 180 tablet 3     clopidogrel (PLAVIX) 75 MG tablet Take 1 tablet (75 mg) by mouth daily 90 tablet 3     ibuprofen (ADVIL/MOTRIN) 200 MG tablet Take 200 mg by mouth every 4 hours as needed for mild pain       aspirin 81 MG EC tablet Take 1 tablet (81 mg) by mouth daily Start tomorrow morning. 90 tablet 3     lisinopril (PRINIVIL/ZESTRIL) 10 MG tablet Take 1 tablet (10 mg) by mouth daily 90 tablet 3     simvastatin (ZOCOR) 40 MG tablet Take 1 tablet (40 mg) by mouth At Bedtime 90 tablet 2       ALLERGIES     Allergies   Allergen Reactions     Penicillins        PAST MEDICAL HISTORY:  Past Medical History:   Diagnosis Date     Campylobacter diarrhea 3/19/2012     DISH (diffuse idiopathic skeletal hyperostosis) 9/2/2016     Hyperlipidemia LDL goal <130 4/25/2017     Inflammatory arthritis 3/14/2012     Joint swelling 9/12/2013     Seborrheic keratosis 9/2/2016       PAST SURGICAL HISTORY:  Past Surgical History:   Procedure Laterality Date     HC LEFT HEART CATHETERIZATION  10/10/2017    percutaneous coronary intervention first diagonal branch of LAD: 32 x 2.25 mm length everolimus eluting Promus premiere stent       FAMILY HISTORY:  Family History   Problem Relation Age of Onset     HEART DISEASE Mother      Aneurysm Father      Other - See Comments Father      bipass in kidney area     Hypertension Brother        SOCIAL HISTORY:  Social History     Social History      "Marital status:      Spouse name: N/A     Number of children: N/A     Years of education: N/A     Social History Main Topics     Smoking status: Never Smoker     Smokeless tobacco: Never Used     Alcohol use 0.0 oz/week     0 Standard drinks or equivalent per week      Comment: 15 beers per week     Drug use: No     Sexual activity: Yes     Partners: Female     Other Topics Concern     None     Social History Narrative       Review of Systems:  Skin:  Negative       Eyes:  Positive for glasses reading  ENT:  Negative      Respiratory:  Negative       Cardiovascular:  Negative      Gastroenterology: Negative      Genitourinary:  Negative      Musculoskeletal:  Positive for joint pain;arthritis;gout;back pain has arthritis in spine; left hip  Neurologic:  Negative      Psychiatric:  Positive for sleep disturbances    Heme/Lymph/Imm:  Negative      Endocrine:  Negative        Physical Exam:  Vitals: /80 (BP Location: Right arm, Patient Position: Chair, Cuff Size: Adult Regular)  Pulse 84  Ht 1.803 m (5' 11\")  Wt 83.2 kg (183 lb 6.4 oz)  BMI 25.58 kg/m2    Constitutional:  cooperative;well developed        Skin:  warm and dry to the touch          Head:  normocephalic        Eyes:  pupils equal and round        Lymph:      ENT:  no pallor or cyanosis        Neck:  JVP normal        Respiratory:  clear to auscultation;normal respiratory excursion         Cardiac: regular rhythm;normal S1 and S2 tachycardic   no presence of murmur          pulses full and equal                                        GI:  abdomen soft        Extremities and Muscular Skeletal:  no deformities, clubbing, cyanosis, erythema observed;no edema              Neurological:           Psych:             CC  FABIAN Whitley CNP  [address]  [place] [address]                  "

## 2017-12-04 NOTE — MR AVS SNAPSHOT
After Visit Summary   12/4/2017    Kevin Simeon    MRN: [id number]           Patient Information     Date Of Birth          1960        Visit Information        Provider Department      12/4/2017 3:50 PM Tameka Álvarez APRN CNP [facility]        Today's Diagnoses     Coronary artery disease involving native coronary artery of native heart without angina pectoris           Follow-ups after your visit        Additional Services     Follow-Up with Cardiologist                 Your next 10 appointments already scheduled     Dec 05, 2017  4:30 PM CST   Cardiac Treatment with Rh Cardiac Rehab 2   [facility] ([facility])    [address]  [facility] [address]   [phone]            Dec 07, 2017  4:30 PM CST   Cardiac Treatment with Rh Cardiac Rehab 2   [facility] ([facility])    [address]  [facility] [address]   [phone]            Dec 14, 2017  4:30 PM CST   Cardiac Treatment with Rh Cardiac Rehab 2   [facility] ([facility])    [address]  [facility] [address]   [phone]            Dec 19, 2017  4:30 PM CST   Cardiac Treatment with Rh Cardiac Rehab 2   [facility] ([facility])    [address]  [facility] [address]   [phone]            Dec 21, 2017  4:30 PM CST   Cardiac Treatment with Rh Cardiac Rehab 2   [facility] ([facility])    [address]  [facility] [address]   [phone]            Dec 26, 2017  4:30 PM CST   Cardiac Treatment with Rh Cardiac Rehab 2   [facility] ([facility])    [address]  [facility] [address]   [phone]            Dec 28, 2017  7:30 AM CST   Discharge Appointment with Rh Cardiac Rehab  "2   [facility] ([facility])    [address]  [facility] [address] [phone]              Future tests that were ordered for you today     Open Future Orders        Priority Expected Expires Ordered    Follow-Up with Cardiologist Routine 3/4/2018 12/4/2018 12/4/2017            Who to contact     If you have questions or need follow up information about today's clinic visit or your schedule please contact [facility] directly at [phone].  Normal or non-critical lab and imaging results will be communicated to you by Spotsterhart, letter or phone within 4 business days after the clinic has received the results. If you do not hear from us within 7 days, please contact the clinic through Screenzt or phone. If you have a critical or abnormal lab result, we will notify you by phone as soon as possible.  Submit refill requests through [employer] or call your pharmacy and they will forward the refill request to us. Please allow 3 business days for your refill to be completed.          Additional Information About Your Visit        Spotsterhart Information     [employer] gives you secure access to your electronic health record. If you see a primary care provider, you can also send messages to your care team and make appointments. If you have questions, please call your primary care clinic.  If you do not have a primary care provider, please call [phone] and they will assist you.        Care EveryWhere ID     This is your Care EveryWhere ID. This could be used by other organizations to access your [place] medical records  [id number]        Your Vitals Were     Pulse Height BMI (Body Mass Index)             84 1.803 m (5' 11\") 25.58 kg/m2          Blood Pressure from Last 3 Encounters:   12/04/17 128/80   10/24/17 138/76   10/10/17 (P) 119/78    Weight from Last 3 Encounters:   12/04/17 83.2 kg (183 lb 6.4 oz)   11/14/17 81.5 kg " (179 lb 9.6 oz)   10/26/17 83.2 kg (183 lb 6.4 oz)              We Performed the Following     Follow-Up with Cardiac Advanced Practice Provider        Primary Care Provider Office Phone # Fax #    Jody Blackburn [phone] [address]  [facility] [address]        Equal Access to Services     [facility] Center[facility]IREE : Hadii aad ku hadasho Soomaali, waaxda luqadaha, qaybta kaalmada adeegyada, waxay idiin hayaan adeeg kharash la'aan . So [facility] [phone].    ATENCIÓN: Si habla español, tiene a evans disposición servicios gratuitos de asistencia lingüística. Llame al [phone].    We comply with applicable federal civil rights laws and [place] laws. We do not discriminate on the basis of race, color, national origin, age, disability, sex, sexual orientation, or gender identity.            Thank you!     Thank you for choosing [facility]  for your care. Our goal is always to provide you with excellent care. Hearing back from our patients is one way we can continue to improve our services. Please take a few minutes to complete the written survey that you may receive in the mail after your visit with us. Thank you!             Your Updated Medication List - Protect others around you: Learn how to safely use, store and throw away your medicines at www.disposemymeds.org.          This list is accurate as of: 12/4/17  4:05 PM.  Always use your most recent med list.                   Brand Name Dispense Instructions for use Diagnosis    aspirin 81 MG EC tablet     90 tablet    Take 1 tablet (81 mg) by mouth daily Start tomorrow morning.    Coronary artery disease involving native heart, angina presence unspecified, unspecified vessel or lesion type, Status post coronary angioplasty       clopidogrel 75 MG tablet    PLAVIX    90 tablet    Take 1 tablet (75 mg) by mouth daily    Coronary artery disease involving native heart, angina presence unspecified, unspecified  vessel or lesion type, Status post coronary angioplasty       ibuprofen 200 MG tablet    ADVIL/MOTRIN     Take 200 mg by mouth every 4 hours as needed for mild pain        lisinopril 10 MG tablet    PRINIVIL/ZESTRIL    90 tablet    Take 1 tablet (10 mg) by mouth daily    Benign essential hypertension       metoprolol 50 MG 24 hr tablet    TOPROL-XL    180 tablet    Take 1 tablet (50 mg) by mouth 2 times daily    Benign essential hypertension       simvastatin 40 MG tablet    ZOCOR    90 tablet    Take 1 tablet (40 mg) by mouth At Bedtime    Hyperlipidemia LDL goal <160

## 2017-12-05 ENCOUNTER — HOSPITAL ENCOUNTER (OUTPATIENT)
Dept: CARDIAC REHAB | Facility: CLINIC | Age: 57
End: 2017-12-05
Attending: INTERNAL MEDICINE
Payer: COMMERCIAL

## 2017-12-05 PROCEDURE — 93798 PHYS/QHP OP CAR RHAB W/ECG: CPT | Performed by: OCCUPATIONAL THERAPIST

## 2017-12-05 PROCEDURE — 40000116 ZZH STATISTIC OP CR VISIT: Performed by: OCCUPATIONAL THERAPIST

## 2017-12-05 NOTE — PROGRESS NOTES
HISTORY OF PRESENT ILLNESS:  This is a 57-year-old male who presents to [facility] today for a followup visit.  He is a patient of Dr. Tejada's seen in our clinic for coronary artery disease, hypertension and hyperlipidemia.      Due to exertional shoulder pain, Kevin underwent a stress test which was abnormal.  This led to coronary angiography and evidence of a subtotal diagonal vessel.  He subsequently underwent stenting reestablishing good flow in October.  He was noted to have remaining 70% mid right coronary artery disease.  Of note, he does have 2 separate orifices of his LAD and circumflex.  Since then he has done well at cardiac rehab and has been faithful about taking his Plavix.  However, he was noted to have elevated systolic blood pressures, and when I saw him last month, I elected to increase his Toprol to 75 mg.  Cardiac rehab did contact us, and he was noted to have elevated heart rates and blood pressures, and therefore, I increased his Toprol to 50 mg twice a day.  He returns today for reassessment.      Kevin tells me he has been enjoying cardiac rehab and feels good exercising there.  He has not had any further exertional shoulder pain.  He is not short of breath.  He denies palpitations, lightheadedness, dizziness, orthopnea or peripheral edema.      PHYSICAL EXAMINATION:   VITAL SIGNS:  His blood pressure today is 124/80, heart rate 84 beats per minute and is regular.   LUNGS:  Clear.   EXTREMITIES:  There is no peripheral edema.      Kevin tells me he has had a fast resting heart rate for several years.  I reviewed cardiac rehab notes, and it appears appropriate heart rate and blood pressure response to activity.  He states his blood pressure has been much better controlled there on the higher dose of Toprol.      IMPRESSION AND PLAN:   1.  Coronary artery disease.  Recent drug-eluting stent to a subtotal diagonal vessel after an abnormal stress test and  atypical chest pain in October.  He is free from any angina symptoms and doing well in cardiac rehab.  We will continue beta blockade, aspirin, Plavix, ACE inhibitor therapy and a statin.   2.  Hypertension.  Blood pressure better controlled on the higher dose of Toprol.   3.  Hyperlipidemia.  Recent LDL level at goal.      Thank you for allowing me to participate in this patient's care.  We will have him return for followup with Dr. eTjada in 3 months.         FABIAN APARICIO, CNP             D: 2017 16:08   T: 2017 21:38   MT: RAEANN      Name:     BERNICE FERRARI   MRN:      [id number]        Account:      [id number]   :      1960           Service Date: 2017      Document: [id number]

## 2017-12-07 ENCOUNTER — HOSPITAL ENCOUNTER (OUTPATIENT)
Dept: CARDIAC REHAB | Facility: CLINIC | Age: 57
End: 2017-12-07
Attending: INTERNAL MEDICINE
Payer: COMMERCIAL

## 2017-12-07 PROCEDURE — 40000116 ZZH STATISTIC OP CR VISIT

## 2017-12-07 PROCEDURE — 93798 PHYS/QHP OP CAR RHAB W/ECG: CPT

## 2017-12-12 ENCOUNTER — MYC MEDICAL ADVICE (OUTPATIENT)
Dept: CARDIOLOGY | Facility: CLINIC | Age: 57
End: 2017-12-12

## 2017-12-12 DIAGNOSIS — I10 BENIGN ESSENTIAL HYPERTENSION: ICD-10-CM

## 2017-12-12 RX ORDER — METOPROLOL SUCCINATE 50 MG/1
50 TABLET, EXTENDED RELEASE ORAL 2 TIMES DAILY
Qty: 180 TABLET | Refills: 3 | Status: SHIPPED | OUTPATIENT
Start: 2017-12-12 | End: 2017-12-12

## 2017-12-12 RX ORDER — METOPROLOL SUCCINATE 50 MG/1
50 TABLET, EXTENDED RELEASE ORAL 2 TIMES DAILY
Qty: 60 TABLET | Refills: 0 | Status: SHIPPED | OUTPATIENT
Start: 2017-12-12 | End: 2019-01-09

## 2017-12-14 ENCOUNTER — HOSPITAL ENCOUNTER (OUTPATIENT)
Dept: CARDIAC REHAB | Facility: CLINIC | Age: 57
End: 2017-12-14
Attending: INTERNAL MEDICINE
Payer: COMMERCIAL

## 2017-12-14 PROCEDURE — 93798 PHYS/QHP OP CAR RHAB W/ECG: CPT

## 2017-12-14 PROCEDURE — 40000116 ZZH STATISTIC OP CR VISIT

## 2017-12-19 ENCOUNTER — HOSPITAL ENCOUNTER (OUTPATIENT)
Dept: CARDIAC REHAB | Facility: CLINIC | Age: 57
End: 2017-12-19
Attending: INTERNAL MEDICINE
Payer: COMMERCIAL

## 2017-12-19 PROCEDURE — 40000116 ZZH STATISTIC OP CR VISIT: Performed by: REHABILITATION PRACTITIONER

## 2017-12-19 PROCEDURE — 93798 PHYS/QHP OP CAR RHAB W/ECG: CPT | Performed by: REHABILITATION PRACTITIONER

## 2017-12-21 ENCOUNTER — HOSPITAL ENCOUNTER (OUTPATIENT)
Dept: CARDIAC REHAB | Facility: CLINIC | Age: 57
End: 2017-12-21
Attending: INTERNAL MEDICINE
Payer: COMMERCIAL

## 2017-12-21 PROCEDURE — 93798 PHYS/QHP OP CAR RHAB W/ECG: CPT

## 2017-12-21 PROCEDURE — 40000116 ZZH STATISTIC OP CR VISIT

## 2017-12-26 ENCOUNTER — HOSPITAL ENCOUNTER (OUTPATIENT)
Dept: CARDIAC REHAB | Facility: CLINIC | Age: 57
End: 2017-12-26
Attending: INTERNAL MEDICINE
Payer: COMMERCIAL

## 2017-12-26 PROCEDURE — 40000116 ZZH STATISTIC OP CR VISIT

## 2017-12-26 PROCEDURE — 93798 PHYS/QHP OP CAR RHAB W/ECG: CPT

## 2017-12-28 ENCOUNTER — HOSPITAL ENCOUNTER (OUTPATIENT)
Dept: CARDIAC REHAB | Facility: CLINIC | Age: 57
End: 2017-12-28
Attending: INTERNAL MEDICINE
Payer: COMMERCIAL

## 2017-12-28 VITALS — WEIGHT: 179 LBS | BODY MASS INDEX: 25.06 KG/M2 | HEIGHT: 71 IN

## 2017-12-28 PROCEDURE — 93798 PHYS/QHP OP CAR RHAB W/ECG: CPT | Performed by: REHABILITATION PRACTITIONER

## 2017-12-28 PROCEDURE — 93797 PHYS/QHP OP CAR RHAB WO ECG: CPT | Performed by: REHABILITATION PRACTITIONER

## 2017-12-28 PROCEDURE — 40000116 ZZH STATISTIC OP CR VISIT: Performed by: REHABILITATION PRACTITIONER

## 2017-12-28 PROCEDURE — 40000575 ZZH STATISTIC OP CARDIAC VISIT #2: Performed by: REHABILITATION PRACTITIONER

## 2017-12-28 ASSESSMENT — 6 MINUTE WALK TEST (6MWT)
MALE CALC: 2055.01
FEMALE CALC: 1743.81
GENDER SELECTION: MALE
TOTAL DISTANCE WALKED (FT): 1765
TOTAL DISTANCE WALKED (FT): 2130
PREDICTED: 2067.54

## 2017-12-28 NOTE — PROGRESS NOTES
Kevin guicho  57 year old  DX: STENT 12/28/17 0900   Session   Session Discharge Note   Certified through this date 01/06/18   Physician cosignature/electronic signature indicates agreements with the ITP document and approval of discharge.   Cardiac Rehab Assessment 10/26/17 Patient presents for initial evaluation of cardiac rehab s/p SHANNON to D1. Patient was experiencing left shoulder pain, which he initially related to shoulder injury. Symptoms did not improve with pain medication and patient underwent stress testing. Stent was placed 10-10-17 and patient reports he's been feeling back to normal. He does experience lightheadedness on occasion and was encouraged to drink plenty of water. He has returned to work full-time as an  without any complaints. He is a regular walker and typically achieves up to 15,000-20,000 steps per day. Patient is interested and motivated to attend rehab for monitored exercise and to benefit from nutritional counseling. Patient will greatly benefit from skilled therapy to meet 1:1 with dietician to cover heart healthy guidelines, to monitor CV response to exercise and to educate patient on home exercise guidelines to progress patient towards goals.  11/7/17. No change in POC at this time.  ITP forwarded. 11/14/17 PT is making great progress in cardiac rehab and currently tolerates a MET level of 4.4. He is walking daily up to 60 minutes. He has noticed his gout continues to be an issue for him and will considering contacting his primary care MD regarding the issue. Patient is progressing nicely towards goals and plans to meet with dietician tomorrow for a private consultation. He has been tracking his dietary intake for the past 3 weeks and will discuss this with the dietician. Patient continues to benefit from skilled therapy to monitor CV response to exercise to educate on risk management, to provide heart healthy dietary education and to provide exercise guidelines to patient.   12/28/17  DISCHARGE SUMMARY can be seen in the daily session note.     General Information   Treatment Diagnosis Stent   Date of Treatment Diagnosis 10/10/17   Significant Past CV History None   Comorbidities None   Other Medical History gout in right foot, pain in shoulders    Lead up symptoms pain in left shoulder    Hospital Location [facility]   Hospital Discharge Date 10/26/17   Signs and Symptoms Post Hospital Discharge Dizziness;Fatigue   Outpatient Cardiac Rehab Start Date 10/26/17   Primary Physician Dr. Jody Blackburn   Primary Physician Follow Up Advised to schedule appointment   Surgeon NA   Surgeon Follow Up NA   Cardiologist Dr. Tejada   Cardiologist Follow Up Completed   Ejection Fraction 55-60%   Risk Stratification Low   Summary of Cath Report   Summary of Cath Report Available   Date Performed 10/10/17   Left Main nonexistent- there are separate orifices for both LAD and LCx   LAD 30%-99%  (stent)   LCX no narrowing   RCA 70% of small nondominant vessel   Cath Report Comments 10/26/17 Placement of SHANNON in first diagonal branch of LAD. No ther significant focal coronary narrowing   Living and Work Status    Living Arrangements and Social Status house   Support System Live with an adult   Return to Employment Yes   Occupation    Preventative Medications   CMS recommended medications Ace inhibitors;Antiplatelets;Beta Blocker;Lipid Lowering;Influenza vaccination   Pain   Patient Currently in Pain Yes   Pain Location right foot   Pain Rating 3/10   Pain Description Ache   Pain Description Comment 11/14/17 No change in gout pain. PT considering talking to primary care MD about this.  12/28/17  PT reports slight gout that is affecting him today.  Discussed medication usage to help.    Physical Assessments   Incisions WNL   Edema None   Right Lung Sounds not assessed   Left Lung Sounds not assessed   Limitations Orthopedic   Individualized Treatment Plan   Monitored Sessions Scheduled 12   Monitored Sessions  "Attended 17   Oxygen   Supplemental Oxygen needed No   Nutrition Management - Weight Management   Assessment Re-assessment   Age 57   Weight 81.2 kg (179 lb)   Height 1.803 m (5' 10.98\")   BMI (Calculated) 25.03   Initial Rate Your Plate Score. Dietary tool to assess eating patterns. Scores range from 24 to 72. The higher the score the healthier the eating pattern. 49   Discharge Rate Your Plate Score 63   Weight Management Comments 12/28/17  PT encouraged to maintain his current wt.  To focus on his exercise program and healthy eating.     Nutrition Management - Lipids   Lipids Labs Available   Date 10/24/17   Total Cholesterol 121   Triglycerides 124   HDL 43   LDL 53   Prescribed Lipid Medication Yes   Statin Intensity High Intensity   Lipid Comments 12/28/17  Reviewed numbers.  Discussed benefits of medications beyond lowering the numbers and discussed foods that contribute to various CHOL values.    Nutrition Management - Diabetes   Diabetes No   Nutrition Management Summary   Dietary Recommendations Low Fat;Low Cholesterol;Low Sodium   Stages of Change for Diet Compliance Action   Interventions Planned Refer for Individual Diet Consultation;Complete Food Diary;Instruct on Label Reading;Attend Nutrition Education Class(es)   Patient Goals Goal #1   Goal #1 Description Patient will complete dietary food log for 3 days and then will follow up for 1:1 with dietician.   Goal #1 Target Date 12/11/17   Goal #1 Progress Towards Goal 11/14/17 PT has been tracking dietary intake the last 3 weeks to prepare for dietary consult tomorrow. He has a list of questions prepared for the appointment.   Nutrition Summary Comments 10/26/17 Patient eats plenty of fresh fruits and vegetables and mostly grills his meat. Patient admits he does not always pick lean meat choices. He is interested in completing a food log and meeting with dietician, see goal above.   12/28/17  PT is eating heart healhty evident by his RYP score, wt loss. "  He is watching NA+ content very closely.  He reported that he studied his [employer] order and lowered the NA+ as much as possible.  He has found that when he eats soup or something that is higher in NA+ he does not particularly like it now.    Psychosocial Management   Psychosocial Assessment Re-assessment   Is there history of clinical depression or increased risk of depression? No previous history   Current Level of Stress per Patient Report Mild   Current Coping Skills Uses Stress Management/Relaxation Techniques;Has Positive Support System  (walking the dog)   Initial Patient Health Questionnaire -9 Score (PHQ-9) for depression. 5-9 Minimal symptoms, 10-14 Minor depression, 15-19 Major depression, moderately severe, > 20 Major depression, severe  4   Discharge PHQ-9 Score for Depression 2   Initial [facility] COOP Survey score.  Quality of Life:   If total score > 25 review individual areas where patient rated a 4 or 5.  Consider patients current medical condition and what role that plays on the score.   Adjust treatment protocol to improve areas of concern.  Consider the following:  PHQ9 score, DASI, and re-assessment within the next 30 days to assist with developing treatments.  20   Discharge [facility] COOP Survey Score 11   Stages of Change Preparation   Interventions Planned Patient to verbalize understanding of behavioral assessment results;Patient to verbalize understanding of negative impact of stress to personal health   Interventions In Progress or Completed Patient verbalizes understanding of behavioral assessment scores;Patient verbalizes understanding of negative impact of stress to personal health;Pt recognizes signs and symptoms of depression   Patient Goal No   Psychosocial Comments 10/26/17 Patient admits work can be stressful at times. He does not have a formal stress management goal at this time, but plans to work on incorporating more prayer into his day as well as possibly meditating. 11/14/17  PT continues to use stress management skills as needed to relieve work related stress.  12/28/17  THerapist offered a variety of phone APPs to assist with relaxation    Psychosocial Target Outcome Maximize coping skills   Other Core Components - Hypertension   History of or Diagnosis of Hypertension Yes   Currently taking Anti-Hypertensives Yes;Beta blocker;Ace Inhibitor   Hypertension Comments 12/28/17  PT beta blocker adjusted to twice daily.    Other Core Components - Tobacco   History of Tobacco Use Never   Other Core Components Summary   Interventions Planned Instruct patient on the DASH diet;Attend education class on Blood Pressure;Attend education class(es) on Nutrition   Activity/Exercise History   Activity/Exercise Assessment Re-assessment   Activity/Exercise Status prior to event? Was Physically Active;Participated in an Exercise Program   Number of Days Currently participating in Moderate Physical Activity? 7   Number of Days Currently performing  Aerobic Exercise (including rehab)? 7   Number of Minutes per Session Currently of Aerobic Exercise (average)? 50-60   Current Stage of Change (Physical Activity) Maintenance   Current Stage of Change (Aerobic Exercise) Maintenance   Activity/Exercise Comments 10/26/17 Patient walks 20+ minutes every morning and evening. He increases his walking up to 60 minutes on weekends. He achieves 12,000-20,000 steps per day and reports he has returned to these levels. Patient is very physically active both at work and at home. 11/14/17 PT is maintaining his physically active routine of walking 12,000-20,000 steps daily. He walks 25 minutes in the morning and again in the evening.   12/28/17  PT continues to walk daily with his dog.  30-60 min.  He has a route that has hills and can be challenged.    Exercise Assessment   6 Minute Walk Predicted - Gender Selection Male   6 Minute Walk Predicted (Male) 2055.01   6 Minute Walk Predicted (Female) 1743.81   Initial 6 Minute  Walk Distance (Feet) 1765 ft   Discharge 6 Minute Walk Distance (Feet) 2130   Resting  bpm   Exercise  bpm   Post Exercise  bpm   Resting /72   Exercise /72   Post Exercise /70   Effort Rating 5   Current MET Level 7.2   MET Level Goal 6-7   ECG Rhythm Normal sinus rhythm;Sinus tachycardia   Ectopy None   Current Symptoms Other (comments)  (foot pain related to gout)   Limitations/Restrictions Orthopedic (see comments)   Exercise Prescription   Mode Treadmill;Recumbant bike;Elliptical;Weights   Duration/Time 15-30 min   Frequency 2 days/week   THR (85% of age predicted max HR) 138.55   OMNI Effort Rating (0-10 Scale) 4-6/10   Progression Progress peak intensity by 1/2 MET per week;Total exercise time of 30-45 minutes   Recommended Home Exercise   Type of Exercise Walking   Frequency (days per week) 4-6   Duration (minutes per session) 45-60 min aerobic exercise   Effort Rating Recommended 4-6/10   30 Day Exercise Plan 12/28/17  PT instructed on progression of MET level, interval training, HR monitoring, RPE monitroing and progression of wt training.  PT to continue walking on his own outdoors year round.  We discussed when it is not safe to exercise outdoors.    Current Home Exercise   Type of Exercise Walking   Frequency (days per week) 7   Duration (minutes per session) 50-60   Follow-up/On-going Support   Provider follow-up needed on the following Heart Rate   Comments 11/14/17 Messaged Dr. Tejada regarding high resting HR and timing of Metoprolol dose.    Learning Assessment   Learner Patient   Primary Language English   Preferred Learning Style Listening;Reading   Barriers to Learning No barriers noted   Patient Education   Education recommended Anatomy and Physiology of the Heart;Blood Pressure;Exercise Principles;Nutrition;Medication Overview   Education Comments 11/14/17 PT mostly interested in nutrition education. He will not be able to attend other education classes  because of his work schedule.

## 2018-03-12 ENCOUNTER — MYC MEDICAL ADVICE (OUTPATIENT)
Dept: FAMILY MEDICINE | Facility: CLINIC | Age: 58
End: 2018-03-12

## 2018-03-21 LAB — AST SERPL-CCNC: 39 U/L (ref 5–34)

## 2018-03-29 DIAGNOSIS — E78.5 HYPERLIPIDEMIA LDL GOAL <160: ICD-10-CM

## 2018-03-29 NOTE — TELEPHONE ENCOUNTER
"Requested Prescriptions   Pending Prescriptions Disp Refills     simvastatin (ZOCOR) 40 MG tablet  Last Written Prescription Date:  7/17/2017  Last Fill Quantity: 90 tablet,  # refills: 2   Last office visit: 9/14/2017 with prescribing provider:  Alexey     Future Office Visit:   Next 5 appointments (look out 90 days)     Apr 30, 2018  4:15 PM CDT   Return Visit with Noé Tejada MD   [facility] ([facility])    [address] [address] [phone]               90 tablet 2     Sig: Take 1 tablet (40 mg) by mouth At Bedtime    Statins Protocol Passed    3/29/2018  4:34 PM       Passed - LDL on file in past 12 months    Recent Labs   Lab Test  10/24/17   1307   LDL  53            Passed - No abnormal creatine kinase in past 12 months    No lab results found.            Passed - Recent (12 mo) or future (30 days) visit within the authorizing provider's specialty    Patient had office visit in the last 12 months or has a visit in the next 30 days with authorizing provider or within the authorizing provider's specialty.  See \"Patient Info\" tab in inbasket, or \"Choose Columns\" in Meds & Orders section of the refill encounter.           Passed - Patient is age 18 or older          "

## 2018-03-30 RX ORDER — SIMVASTATIN 40 MG
40 TABLET ORAL AT BEDTIME
Qty: 90 TABLET | Refills: 0 | Status: SHIPPED | OUTPATIENT
Start: 2018-03-30 | End: 2018-07-25

## 2018-04-12 ENCOUNTER — TRANSFERRED RECORDS (OUTPATIENT)
Dept: HEALTH INFORMATION MANAGEMENT | Facility: CLINIC | Age: 58
End: 2018-04-12

## 2018-04-12 LAB
ALT SERPL-CCNC: 41 IU/L (ref 5–40)
AST SERPL-CCNC: 27 U/L (ref 5–34)
CREAT SERPL-MCNC: 0.8 MG/DL (ref 0.5–1.3)
GFR SERPL CREATININE-BSD FRML MDRD: 105.9 ML/MIN/1.73M2

## 2018-04-27 ENCOUNTER — PRE VISIT (OUTPATIENT)
Dept: CARDIOLOGY | Facility: CLINIC | Age: 58
End: 2018-04-27

## 2018-04-30 ENCOUNTER — OFFICE VISIT (OUTPATIENT)
Dept: CARDIOLOGY | Facility: CLINIC | Age: 58
End: 2018-04-30
Attending: NURSE PRACTITIONER
Payer: COMMERCIAL

## 2018-04-30 VITALS
SYSTOLIC BLOOD PRESSURE: 126 MMHG | WEIGHT: 183.1 LBS | HEIGHT: 71 IN | HEART RATE: 88 BPM | BODY MASS INDEX: 25.63 KG/M2 | DIASTOLIC BLOOD PRESSURE: 70 MMHG

## 2018-04-30 DIAGNOSIS — I25.10 CORONARY ARTERY DISEASE INVOLVING NATIVE CORONARY ARTERY OF NATIVE HEART WITHOUT ANGINA PECTORIS: Primary | ICD-10-CM

## 2018-04-30 DIAGNOSIS — Z95.5 STATUS POST INSERTION OF DRUG ELUTING CORONARY ARTERY STENT: ICD-10-CM

## 2018-04-30 DIAGNOSIS — Z98.61 STATUS POST CORONARY ANGIOPLASTY: ICD-10-CM

## 2018-04-30 DIAGNOSIS — E78.2 MIXED HYPERLIPIDEMIA: ICD-10-CM

## 2018-04-30 DIAGNOSIS — I10 BENIGN ESSENTIAL HYPERTENSION: ICD-10-CM

## 2018-04-30 PROCEDURE — 99214 OFFICE O/P EST MOD 30 MIN: CPT | Performed by: INTERNAL MEDICINE

## 2018-04-30 RX ORDER — ALLOPURINOL 300 MG/1
300 TABLET ORAL DAILY
COMMUNITY
End: 2021-11-02

## 2018-04-30 NOTE — LETTER
4/30/2018    Jody Blackburn MD  [address]  [facility] [address]    RE: Kevin Simeon       Dear Colleague,    I had the pleasure of seeing Kevin Simeon in the [facility].    HPI and Plan:   See dictation:[id number]    Orders Placed This Encounter   Procedures     Follow-Up with Cardiologist     Exercise Stress Echocardiogram       Orders Placed This Encounter   Medications     allopurinol (ZYLOPRIM) 300 MG tablet     Sig: Take 300 mg by mouth daily     PREDNISONE PO     Sig: Take 5 mg by mouth daily       There are no discontinued medications.      Encounter Diagnoses   Name Primary?     Coronary artery disease involving native coronary artery of native heart without angina pectoris      Benign essential hypertension Yes     Status post coronary angioplasty      Mixed hyperlipidemia        CURRENT MEDICATIONS:  Current Outpatient Prescriptions   Medication Sig Dispense Refill     allopurinol (ZYLOPRIM) 300 MG tablet Take 300 mg by mouth daily       aspirin 81 MG EC tablet Take 1 tablet (81 mg) by mouth daily Start tomorrow morning. 90 tablet 3     clopidogrel (PLAVIX) 75 MG tablet Take 1 tablet (75 mg) by mouth daily 90 tablet 3     ibuprofen (ADVIL/MOTRIN) 200 MG tablet Take 200 mg by mouth every 4 hours as needed for mild pain       lisinopril (PRINIVIL/ZESTRIL) 10 MG tablet Take 1 tablet (10 mg) by mouth daily 90 tablet 3     metoprolol (TOPROL-XL) 50 MG 24 hr tablet Take 1 tablet (50 mg) by mouth 2 times daily 60 tablet 0     PREDNISONE PO Take 5 mg by mouth daily       simvastatin (ZOCOR) 40 MG tablet Take 1 tablet (40 mg) by mouth At Bedtime 90 tablet 0       ALLERGIES     Allergies   Allergen Reactions     Penicillins        PAST MEDICAL HISTORY:  Past Medical History:   Diagnosis Date     Campylobacter diarrhea 3/19/2012     DISH (diffuse idiopathic skeletal hyperostosis) 9/2/2016     Hyperlipidemia LDL goal <130 4/25/2017     Inflammatory arthritis 3/14/2012     Joint swelling  "9/12/2013     Seborrheic keratosis 9/2/2016       PAST SURGICAL HISTORY:  Past Surgical History:   Procedure Laterality Date     HC LEFT HEART CATHETERIZATION  10/10/2017    percutaneous coronary intervention first diagonal branch of LAD: 32 x 2.25 mm length everolimus eluting Promus premiere stent       FAMILY HISTORY:  Family History   Problem Relation Age of Onset     HEART DISEASE Mother      Aneurysm Father      Other - See Comments Father      bipass in kidney area     Hypertension Brother        SOCIAL HISTORY:  Social History     Social History     Marital status:      Spouse name: N/A     Number of children: N/A     Years of education: N/A     Social History Main Topics     Smoking status: Never Smoker     Smokeless tobacco: Never Used     Alcohol use 0.0 oz/week     0 Standard drinks or equivalent per week      Comment: 15 beers per week     Drug use: No     Sexual activity: Yes     Partners: Female     Other Topics Concern     None     Social History Narrative       Review of Systems:  Skin:  Positive for lumps or bumps swelling under R arm   Eyes:  Positive for glasses    ENT:  Positive for nasal congestion    Respiratory:  Negative       Cardiovascular:    edema;Positive for L ankle swollen, has gout in foot  Gastroenterology: Negative      Genitourinary:  Negative      Musculoskeletal:  Positive for gout;joint pain    Neurologic:  not assessed      Psychiatric:  Positive for sleep disturbances    Heme/Lymph/Imm:  Negative      Endocrine:  Negative        Physical Exam:  Vitals: /70 (BP Location: Right arm, Patient Position: Sitting, Cuff Size: Adult Regular)  Pulse 88  Ht 1.803 m (5' 11\")  Wt 83.1 kg (183 lb 1.6 oz)  BMI 25.54 kg/m2    Constitutional:  cooperative, alert and oriented, well developed, well nourished, in no acute distress        Skin:  warm and dry to the touch, no apparent skin lesions or masses noted          Head:  normocephalic, no masses or lesions        Eyes:  " pupils equal and round;conjunctivae and lids unremarkable;sclera white;no xanthalasma        Lymph:      ENT:  no pallor or cyanosis, dentition good        Neck:  carotid pulses are full and equal bilaterally, JVP normal, no carotid bruit        Respiratory:  normal breath sounds, clear to auscultation, normal A-P diameter, normal symmetry, normal respiratory excursion, no use of accessory muscles         Cardiac: regular rhythm, normal S1/S2, no S3 or S4, apical impulse not displaced, no murmurs, gallops or rubs                pulses full and equal, no bruits auscultated                                        GI:  abdomen soft, non-tender, BS normoactive, no mass, no HSM, no bruits        Extremities and Muscular Skeletal:  no deformities, clubbing, cyanosis, erythema observed;no edema              Neurological:  no gross motor deficits;affect appropriate        Psych:  Alert and Oriented x 3        CC  FABIAN Whitley CNP  [address]  [place] [address]                Thank you for allowing me to participate in the care of your patient.      Sincerely,     Noé Tejada MD     [facility]    cc:   FABIAN Whitley CNP  [address]  [place] [address]

## 2018-04-30 NOTE — LETTER
4/30/2018      Jody Blackburn MD  [address] [address] [facility] [address]      RE: Kevin Simeon       Dear Colleague,    I had the pleasure of seeing Kevin Simeon in the [facility].    Service Date: 04/30/2018      PRIMARY CARE PHYSICIAN:  Dr. Jody Blackburn.      HISTORY OF PRESENT ILLNESS:  I again had the pleasure of seeing your patient, Keivn Simeon, at [facility] for evaluation of coronary artery disease.  This patient had exertional left shoulder pain, and a stress echocardiogram was abnormal.  Coronary angiography showed evidence of a subtotal first diagonal vessel.  This vessel was stented on 10/10/2017 with no significant residual stenosis.  He also had a 70% mid right coronary artery stenosis.  Of note his LAD and circumflex arteries had separate orifices and there was no common left main.  The patient was doing well with exercise until 2 months ago when he developed gout.  He believes this is secondary to a change in his diet where he increased certain foods.  He denies bleeding diathesis.  He remains on prednisone for his gout.  He has been faithful about taking his aspirin and Plavix and avoiding ibuprofen.  He has a tendency to be tachycardic and we have regulated his heart rate with Toprol-XL.  He denies recurrent shoulder pain, PND, orthopnea, peripheral edema, syncope, presyncope or palpitations.  He walks his dog each day but this is not intense exercise.  No hospitalizations or surgeries since I last saw him in September.  He works as an  and stays quite busy at his occupation.      PHYSICAL EXAMINATION:  As listed below.      ASSESSMENT:   1.  Coronary artery disease.  The patient is status post intracoronary stenting with a drug-eluting stent to the subtotal first diagonal branch artery.  He has been free from angina since that time.  We continue with his current medications.  His blood pressure seems reasonably well controlled.  He will remain on  aspirin and Plavix until October.  We will perform a stress echocardiogram in October to assess any cardiac ischemia.  If his stress echocardiogram in October is normal we will stop his Plavix and my plan is to see him 1 year later.  2.  Mixed hyperlipidemia currently well controlled with simvastatin.  A fasting lipid profile should be again performed in October of this year.  This can be performed through his primary care physician.   3.  Hypertension very well controlled.      It is my pleasure to assist in the care of Kevin Ferrari.  All his questions were answered to his satisfaction.      Bárbara Mae MD       cc:   [place] [address]         BÁRBARA MAE MD, [facility]             D: 2018   T: 2018   MT: RAEANN      Name:     KEVIN FERRARI   MRN:      [id number]        Account:      [id number]   :      1960           Service Date: 2018      Document: [id number]           Outpatient Encounter Prescriptions as of 2018   Medication Sig Dispense Refill     allopurinol (ZYLOPRIM) 300 MG tablet Take 300 mg by mouth daily       aspirin 81 MG EC tablet Take 1 tablet (81 mg) by mouth daily Start tomorrow morning. 90 tablet 3     clopidogrel (PLAVIX) 75 MG tablet Take 1 tablet (75 mg) by mouth daily 90 tablet 3     ibuprofen (ADVIL/MOTRIN) 200 MG tablet Take 200 mg by mouth every 4 hours as needed for mild pain       lisinopril (PRINIVIL/ZESTRIL) 10 MG tablet Take 1 tablet (10 mg) by mouth daily 90 tablet 3     metoprolol (TOPROL-XL) 50 MG 24 hr tablet Take 1 tablet (50 mg) by mouth 2 times daily 60 tablet 0     PREDNISONE PO Take 5 mg by mouth daily       simvastatin (ZOCOR) 40 MG tablet Take 1 tablet (40 mg) by mouth At Bedtime 90 tablet 0     No facility-administered encounter medications on file as of 2018.        Again, thank you for allowing me to participate in the care of your patient.       Sincerely,    Noé Tejada MD     [facility]

## 2018-04-30 NOTE — PROGRESS NOTES
HPI and Plan:   See dictation:[id number]    Orders Placed This Encounter   Procedures     Follow-Up with Cardiologist     Exercise Stress Echocardiogram       Orders Placed This Encounter   Medications     allopurinol (ZYLOPRIM) 300 MG tablet     Sig: Take 300 mg by mouth daily     PREDNISONE PO     Sig: Take 5 mg by mouth daily       There are no discontinued medications.      Encounter Diagnoses   Name Primary?     Coronary artery disease involving native coronary artery of native heart without angina pectoris      Benign essential hypertension Yes     Status post coronary angioplasty      Mixed hyperlipidemia        CURRENT MEDICATIONS:  Current Outpatient Prescriptions   Medication Sig Dispense Refill     allopurinol (ZYLOPRIM) 300 MG tablet Take 300 mg by mouth daily       aspirin 81 MG EC tablet Take 1 tablet (81 mg) by mouth daily Start tomorrow morning. 90 tablet 3     clopidogrel (PLAVIX) 75 MG tablet Take 1 tablet (75 mg) by mouth daily 90 tablet 3     ibuprofen (ADVIL/MOTRIN) 200 MG tablet Take 200 mg by mouth every 4 hours as needed for mild pain       lisinopril (PRINIVIL/ZESTRIL) 10 MG tablet Take 1 tablet (10 mg) by mouth daily 90 tablet 3     metoprolol (TOPROL-XL) 50 MG 24 hr tablet Take 1 tablet (50 mg) by mouth 2 times daily 60 tablet 0     PREDNISONE PO Take 5 mg by mouth daily       simvastatin (ZOCOR) 40 MG tablet Take 1 tablet (40 mg) by mouth At Bedtime 90 tablet 0       ALLERGIES     Allergies   Allergen Reactions     Penicillins        PAST MEDICAL HISTORY:  Past Medical History:   Diagnosis Date     Campylobacter diarrhea 3/19/2012     DISH (diffuse idiopathic skeletal hyperostosis) 9/2/2016     Hyperlipidemia LDL goal <130 4/25/2017     Inflammatory arthritis 3/14/2012     Joint swelling 9/12/2013     Seborrheic keratosis 9/2/2016       PAST SURGICAL HISTORY:  Past Surgical History:   Procedure Laterality Date     HC LEFT HEART CATHETERIZATION  10/10/2017    percutaneous coronary intervention  "first diagonal branch of LAD: 32 x 2.25 mm length everolimus eluting Promus premiere stent       FAMILY HISTORY:  Family History   Problem Relation Age of Onset     HEART DISEASE Mother      Aneurysm Father      Other - See Comments Father      bipass in kidney area     Hypertension Brother        SOCIAL HISTORY:  Social History     Social History     Marital status:      Spouse name: N/A     Number of children: N/A     Years of education: N/A     Social History Main Topics     Smoking status: Never Smoker     Smokeless tobacco: Never Used     Alcohol use 0.0 oz/week     0 Standard drinks or equivalent per week      Comment: 15 beers per week     Drug use: No     Sexual activity: Yes     Partners: Female     Other Topics Concern     None     Social History Narrative       Review of Systems:  Skin:  Positive for lumps or bumps swelling under R arm   Eyes:  Positive for glasses    ENT:  Positive for nasal congestion    Respiratory:  Negative       Cardiovascular:    edema;Positive for L ankle swollen, has gout in foot  Gastroenterology: Negative      Genitourinary:  Negative      Musculoskeletal:  Positive for gout;joint pain    Neurologic:  not assessed      Psychiatric:  Positive for sleep disturbances    Heme/Lymph/Imm:  Negative      Endocrine:  Negative        Physical Exam:  Vitals: /70 (BP Location: Right arm, Patient Position: Sitting, Cuff Size: Adult Regular)  Pulse 88  Ht 1.803 m (5' 11\")  Wt 83.1 kg (183 lb 1.6 oz)  BMI 25.54 kg/m2    Constitutional:  cooperative, alert and oriented, well developed, well nourished, in no acute distress        Skin:  warm and dry to the touch, no apparent skin lesions or masses noted          Head:  normocephalic, no masses or lesions        Eyes:  pupils equal and round;conjunctivae and lids unremarkable;sclera white;no xanthalasma        Lymph:      ENT:  no pallor or cyanosis, dentition good        Neck:  carotid pulses are full and equal bilaterally, " JVP normal, no carotid bruit        Respiratory:  normal breath sounds, clear to auscultation, normal A-P diameter, normal symmetry, normal respiratory excursion, no use of accessory muscles         Cardiac: regular rhythm, normal S1/S2, no S3 or S4, apical impulse not displaced, no murmurs, gallops or rubs                pulses full and equal, no bruits auscultated                                        GI:  abdomen soft, non-tender, BS normoactive, no mass, no HSM, no bruits        Extremities and Muscular Skeletal:  no deformities, clubbing, cyanosis, erythema observed;no edema              Neurological:  no gross motor deficits;affect appropriate        Psych:  Alert and Oriented x 3        CC  FABIAN Whitley CNP  [address]  [place] [address]

## 2018-04-30 NOTE — MR AVS SNAPSHOT
After Visit Summary   4/30/2018    Kevin Simeon    MRN: [id number]           Patient Information     Date Of Birth          1960        Visit Information        Provider Department      4/30/2018 4:15 PM Noé Tejada MD [facility]        Today's Diagnoses     Benign essential hypertension    -  1    Coronary artery disease involving native coronary artery of native heart without angina pectoris        Status post coronary angioplasty        Mixed hyperlipidemia           Follow-ups after your visit        Additional Services     Follow-Up with Cardiologist                 Future tests that were ordered for you today     Open Future Orders        Priority Expected Expires Ordered    Exercise Stress Echocardiogram Routine 10/17/2018 4/30/2019 4/30/2018    Follow-Up with Cardiologist Routine 10/19/2018 4/30/2019 4/30/2018            Who to contact     If you have questions or need follow up information about today's clinic visit or your schedule please contact [facility] directly at [phone].  Normal or non-critical lab and imaging results will be communicated to you by CardStarhart, letter or phone within 4 business days after the clinic has received the results. If you do not hear from us within 7 days, please contact the clinic through CardStarhart or phone. If you have a critical or abnormal lab result, we will notify you by phone as soon as possible.  Submit refill requests through [employer] or call your pharmacy and they will forward the refill request to us. Please allow 3 business days for your refill to be completed.          Additional Information About Your Visit        CardStarhart Information     [employer] gives you secure access to your electronic health record. If you see a primary care provider, you can also send messages to your care team and make appointments. If you have questions, please call your  "primary care clinic.  If you do not have a primary care provider, please call [phone] and they will assist you.        Care EveryWhere ID     This is your Care EveryWhere ID. This could be used by other organizations to access your [place] medical records  [id number]        Your Vitals Were     Pulse Height BMI (Body Mass Index)             88 1.803 m (5' 11\") 25.54 kg/m2          Blood Pressure from Last 3 Encounters:   04/30/18 126/70   12/04/17 128/80   10/24/17 138/76    Weight from Last 3 Encounters:   04/30/18 83.1 kg (183 lb 1.6 oz)   12/28/17 81.2 kg (179 lb)   12/04/17 83.2 kg (183 lb 6.4 oz)              We Performed the Following     Follow-Up with Cardiologist        Primary Care Provider Office Phone # Fax #    Jody Blackburn [phone] [address] [facility] [address]        Equal Access to Services     SH[facility] CenterDESIREE AH: Hadii aad ku hadasho Soomaali, waaxda luqadaha, qaybta kaalmada adeegyada, waxay mercyin haywaynen lita persaud . So [facility] [phone].    ATENCIÓN: Si habla español, tiene a evans disposición servicios gratuitos de asistencia lingüística. [facility] [phone].    We comply with applicable federal civil rights laws and [place] laws. We do not discriminate on the basis of race, color, national origin, age, disability, sex, sexual orientation, or gender identity.            Thank you!     Thank you for choosing [facility]  for your care. Our goal is always to provide you with excellent care. Hearing back from our patients is one way we can continue to improve our services. Please take a few minutes to complete the written survey that you may receive in the mail after your visit with us. Thank you!             Your Updated Medication List - Protect others around you: Learn how to safely use, store and throw away your medicines at www.disposemymeds.org.          This list is accurate as of 4/30/18  4:49 PM.  " Always use your most recent med list.                   Brand Name Dispense Instructions for use Diagnosis    allopurinol 300 MG tablet    ZYLOPRIM     Take 300 mg by mouth daily        aspirin 81 MG EC tablet     90 tablet    Take 1 tablet (81 mg) by mouth daily Start tomorrow morning.    Coronary artery disease involving native heart, angina presence unspecified, unspecified vessel or lesion type, Status post coronary angioplasty       clopidogrel 75 MG tablet    PLAVIX    90 tablet    Take 1 tablet (75 mg) by mouth daily    Coronary artery disease involving native heart, angina presence unspecified, unspecified vessel or lesion type, Status post coronary angioplasty       ibuprofen 200 MG tablet    ADVIL/MOTRIN     Take 200 mg by mouth every 4 hours as needed for mild pain        lisinopril 10 MG tablet    PRINIVIL/ZESTRIL    90 tablet    Take 1 tablet (10 mg) by mouth daily    Benign essential hypertension       metoprolol succinate 50 MG 24 hr tablet    TOPROL-XL    60 tablet    Take 1 tablet (50 mg) by mouth 2 times daily    Benign essential hypertension       PREDNISONE PO      Take 5 mg by mouth daily        simvastatin 40 MG tablet    ZOCOR    90 tablet    Take 1 tablet (40 mg) by mouth At Bedtime    Hyperlipidemia LDL goal <160

## 2018-05-01 NOTE — PROGRESS NOTES
Service Date: 04/30/2018      PRIMARY CARE PHYSICIAN:  Dr. Jody Blackburn.      HISTORY OF PRESENT ILLNESS:  I again had the pleasure of seeing your patient, Kevin Simeon, at [facility] for evaluation of coronary artery disease.  This patient had exertional left shoulder pain, and a stress echocardiogram was abnormal.  Coronary angiography showed evidence of a subtotal first diagonal vessel.  This vessel was stented on 10/10/2017 with no significant residual stenosis.  He also had a 70% mid right coronary artery stenosis.  Of note his LAD and circumflex arteries had separate orifices and there was no common left main.  The patient was doing well with exercise until 2 months ago when he developed gout.  He believes this is secondary to a change in his diet where he increased certain foods.  He denies bleeding diathesis.  He remains on prednisone for his gout.  He has been faithful about taking his aspirin and Plavix and avoiding ibuprofen.  He has a tendency to be tachycardic and we have regulated his heart rate with Toprol-XL.  He denies recurrent shoulder pain, PND, orthopnea, peripheral edema, syncope, presyncope or palpitations.  He walks his dog each day but this is not intense exercise.  No hospitalizations or surgeries since I last saw him in September.  He works as an  and stays quite busy at his occupation.      PHYSICAL EXAMINATION:  As listed below.      ASSESSMENT:   1.  Coronary artery disease.  The patient is status post intracoronary stenting with a drug-eluting stent to the subtotal first diagonal branch artery.  He has been free from angina since that time.  We continue with his current medications.  His blood pressure seems reasonably well controlled.  He will remain on aspirin and Plavix until October.  We will perform a stress echocardiogram in October to assess any cardiac ischemia.  If his stress echocardiogram in October is normal we will stop his Plavix and my plan is  to see him 1 year later.  2.  Mixed hyperlipidemia currently well controlled with simvastatin.  A fasting lipid profile should be again performed in October of this year.  This can be performed through his primary care physician.   3.  Hypertension very well controlled.      It is my pleasure to assist in the care of Kevin Ferrari.  All his questions were answered to his satisfaction.      Bárbara Mae MD       cc:   [place] [address]         [facility]RBARA MAE MD, [facility]             D: 2018   T: 2018   MT: RAEANN      Name:     KEVIN FERRARI   MRN:      [id number]        Account:      [id number]   :      1960           Service Date: 2018      Document: [id number]

## 2018-06-21 ENCOUNTER — TRANSFERRED RECORDS (OUTPATIENT)
Dept: HEALTH INFORMATION MANAGEMENT | Facility: CLINIC | Age: 58
End: 2018-06-21

## 2018-06-21 LAB
ALT SERPL-CCNC: 49 IU/L (ref 5–40)
CREAT SERPL-MCNC: 1.02 MG/DL (ref 0.5–1.3)
GFR SERPL CREATININE-BSD FRML MDRD: 80 ML/MIN/1.73M2

## 2018-07-25 DIAGNOSIS — E78.5 HYPERLIPIDEMIA LDL GOAL <160: ICD-10-CM

## 2018-07-25 RX ORDER — SIMVASTATIN 40 MG
40 TABLET ORAL AT BEDTIME
Qty: 90 TABLET | Refills: 0 | Status: SHIPPED | OUTPATIENT
Start: 2018-07-25 | End: 2018-11-10

## 2018-07-25 NOTE — TELEPHONE ENCOUNTER
"Requested Prescriptions   Pending Prescriptions Disp Refills     simvastatin (ZOCOR) 40 MG tablet    Last Written Prescription Date: 3/30/18  Last Fill Quantity: 90 tablets,  # refills: 0   Last office visit: 9/14/2017 with prescribing provider:  Alexey   Future Office Visit:     90 tablet 0     Sig: Take 1 tablet (40 mg) by mouth At Bedtime    Statins Protocol Passed    7/25/2018 10:08 AM       Passed - LDL on file in past 12 months    Recent Labs   Lab Test  10/24/17   1307   LDL  53            Passed - No abnormal creatine kinase in past 12 months    No lab results found.            Passed - Recent (12 mo) or future (30 days) visit within the authorizing provider's specialty    Patient had office visit in the last 12 months or has a visit in the next 30 days with authorizing provider or within the authorizing provider's specialty.  See \"Patient Info\" tab in inbasket, or \"Choose Columns\" in Meds & Orders section of the refill encounter.           Passed - Patient is age 18 or older          "

## 2018-07-25 NOTE — TELEPHONE ENCOUNTER
Medication is being filled for 1 time refill only due to:  Patient needs to be seen because due for fasting labs in October.    Prescription approved per [facility] Refill Protocol.    Vida KING RN, BSN, PHN  [facility] Flex RN

## 2018-10-15 DIAGNOSIS — I10 BENIGN ESSENTIAL HYPERTENSION: ICD-10-CM

## 2018-10-15 RX ORDER — LISINOPRIL 10 MG/1
10 TABLET ORAL DAILY
Qty: 90 TABLET | Refills: 0 | Status: SHIPPED | OUTPATIENT
Start: 2018-10-15 | End: 2019-01-08

## 2018-10-26 DIAGNOSIS — I25.10 CORONARY ARTERY DISEASE INVOLVING NATIVE HEART, ANGINA PRESENCE UNSPECIFIED, UNSPECIFIED VESSEL OR LESION TYPE: ICD-10-CM

## 2018-10-26 DIAGNOSIS — Z98.61 STATUS POST CORONARY ANGIOPLASTY: ICD-10-CM

## 2018-10-26 RX ORDER — CLOPIDOGREL BISULFATE 75 MG/1
75 TABLET ORAL DAILY
Qty: 30 TABLET | Refills: 1 | Status: SHIPPED | OUTPATIENT
Start: 2018-10-26 | End: 2018-10-30

## 2018-10-30 DIAGNOSIS — Z98.61 STATUS POST CORONARY ANGIOPLASTY: ICD-10-CM

## 2018-10-30 DIAGNOSIS — I25.10 CORONARY ARTERY DISEASE INVOLVING NATIVE HEART, ANGINA PRESENCE UNSPECIFIED, UNSPECIFIED VESSEL OR LESION TYPE: ICD-10-CM

## 2018-10-30 RX ORDER — CLOPIDOGREL BISULFATE 75 MG/1
75 TABLET ORAL DAILY
Qty: 90 TABLET | Refills: 0 | Status: SHIPPED | OUTPATIENT
Start: 2018-10-30 | End: 2018-11-27

## 2018-11-10 ENCOUNTER — MYC MEDICAL ADVICE (OUTPATIENT)
Dept: FAMILY MEDICINE | Facility: CLINIC | Age: 58
End: 2018-11-10

## 2018-11-10 DIAGNOSIS — E78.5 HYPERLIPIDEMIA LDL GOAL <160: ICD-10-CM

## 2018-11-10 RX ORDER — SIMVASTATIN 40 MG
40 TABLET ORAL AT BEDTIME
Qty: 30 TABLET | Refills: 0 | Status: SHIPPED | OUTPATIENT
Start: 2018-11-10 | End: 2018-11-10

## 2018-11-10 NOTE — TELEPHONE ENCOUNTER
We called Kevin.  While he is following with cardiology informed role of primary care. Best to see PCP at least once a year if requesting Dr. Blackburn's care, Rx's, etc.   Agrees to be seen. Fasting med check 11/14/18.  Will request 90 day refills at visit.  T'd up in encounter.   [facility].   Prescription approved per [facility] Refill Protocol.  Óscar Otto RN

## 2018-11-14 ENCOUNTER — OFFICE VISIT (OUTPATIENT)
Dept: FAMILY MEDICINE | Facility: CLINIC | Age: 58
End: 2018-11-14
Payer: COMMERCIAL

## 2018-11-14 VITALS
WEIGHT: 179 LBS | RESPIRATION RATE: 16 BRPM | TEMPERATURE: 97.6 F | HEIGHT: 71 IN | DIASTOLIC BLOOD PRESSURE: 82 MMHG | HEART RATE: 76 BPM | OXYGEN SATURATION: 98 % | SYSTOLIC BLOOD PRESSURE: 130 MMHG | BODY MASS INDEX: 25.06 KG/M2

## 2018-11-14 DIAGNOSIS — E78.5 HYPERLIPIDEMIA LDL GOAL <160: ICD-10-CM

## 2018-11-14 DIAGNOSIS — Z11.4 SCREENING FOR HIV (HUMAN IMMUNODEFICIENCY VIRUS): ICD-10-CM

## 2018-11-14 DIAGNOSIS — Z11.59 NEED FOR HEPATITIS C SCREENING TEST: ICD-10-CM

## 2018-11-14 DIAGNOSIS — Z23 NEED FOR PROPHYLACTIC VACCINATION AND INOCULATION AGAINST INFLUENZA: Primary | ICD-10-CM

## 2018-11-14 LAB
CHOLEST SERPL-MCNC: 168 MG/DL
HCV AB SERPL QL IA: NONREACTIVE
HDLC SERPL-MCNC: 47 MG/DL
HIV 1+2 AB+HIV1 P24 AG SERPL QL IA: NONREACTIVE
LDLC SERPL CALC-MCNC: 77 MG/DL
NONHDLC SERPL-MCNC: 121 MG/DL
TRIGL SERPL-MCNC: 218 MG/DL

## 2018-11-14 PROCEDURE — 99213 OFFICE O/P EST LOW 20 MIN: CPT | Mod: 25 | Performed by: FAMILY MEDICINE

## 2018-11-14 PROCEDURE — 90471 IMMUNIZATION ADMIN: CPT | Performed by: FAMILY MEDICINE

## 2018-11-14 PROCEDURE — 36415 COLL VENOUS BLD VENIPUNCTURE: CPT | Performed by: FAMILY MEDICINE

## 2018-11-14 PROCEDURE — 90682 RIV4 VACC RECOMBINANT DNA IM: CPT | Performed by: FAMILY MEDICINE

## 2018-11-14 PROCEDURE — 87389 HIV-1 AG W/HIV-1&-2 AB AG IA: CPT | Performed by: FAMILY MEDICINE

## 2018-11-14 PROCEDURE — 86803 HEPATITIS C AB TEST: CPT | Performed by: FAMILY MEDICINE

## 2018-11-14 PROCEDURE — 80061 LIPID PANEL: CPT | Performed by: FAMILY MEDICINE

## 2018-11-14 RX ORDER — SIMVASTATIN 40 MG
40 TABLET ORAL AT BEDTIME
Qty: 90 TABLET | Refills: 3 | Status: SHIPPED | OUTPATIENT
Start: 2018-11-14 | End: 2020-02-18

## 2018-11-14 NOTE — PROGRESS NOTES
SUBJECTIVE:   Kevin Simeon is a 58 year old male who presents to clinic today for the following health issues:      History of Present Illness     Hyperlipidemia:     Low fat/chol diet rating::  Fair    Taking Statins::  YES    Lipid Medications or Supplements::  None    Diet:  Regular (no restrictions)  Frequency of exercise:  6-7 days/week  Duration of exercise:  30-45 minutes  Taking medications regularly:  Yes  Medication side effects:  None  Additional concerns today:  No    Hyperlipidemia Follow-Up      Rate your low fat/cholesterol diet?: good    Taking statin?  Yes, no muscle aches from statin    Other lipid medications/supplements?:  none    Problem list and histories reviewed & adjusted, as indicated.  Additional history: as documented        Patient Active Problem List   Diagnosis     Bloody diarrhea     CARDIOVASCULAR SCREENING; LDL GOAL LESS THAN 160     Swelling of joint     Inflammatory arthritis     Campylobacter diarrhea     Joint swelling     DISH (diffuse idiopathic skeletal hyperostosis)     Seborrheic keratosis     Benign essential hypertension     Hyperlipidemia LDL goal <130     Abnormal cardiovascular stress test     Coronary artery disease involving native coronary artery of native heart without angina pectoris     Mixed hyperlipidemia     Status post insertion of drug eluting coronary artery stent     Past Surgical History:   Procedure Laterality Date     HC LEFT HEART CATHETERIZATION  10/10/2017    percutaneous coronary intervention first diagonal branch of LAD: 32 x 2.25 mm length everolimus eluting Promus premiere stent       Social History   Substance Use Topics     Smoking status: Never Smoker     Smokeless tobacco: Never Used     Alcohol use 0.0 oz/week     0 Standard drinks or equivalent per week      Comment: 15 beers per week     Family History   Problem Relation Age of Onset     HEART DISEASE Mother      Aneurysm Father      Other - See Comments Father      bipass in kidney area  "    Hypertension Brother            ROS:      OBJECTIVE:     /82 (BP Location: Right arm, Patient Position: Chair, Cuff Size: Adult Large)  Pulse 76  Temp 97.6  F (36.4  C) (Oral)  Resp 16  Ht 5' 11\" (1.803 m)  Wt 179 lb (81.2 kg)  SpO2 98%  BMI 24.97 kg/m2  Body mass index is 24.97 kg/(m^2).  GENERAL: healthy, alert and no distress  RESP: lungs clear to auscultation - no rales, rhonchi or wheezes  CV: regular rate and rhythm, normal S1 S2, no S3 or S4, no murmur, click or rub, no peripheral edema and peripheral pulses strong  MS: no gross musculoskeletal defects noted, no edema        ASSESSMENT/PLAN:             1. Hyperlipidemia LDL goal <160  Continue on   - simvastatin (ZOCOR) 40 MG tablet; Take 1 tablet (40 mg) by mouth At Bedtime  Dispense: 90 tablet; Refill: 3  Check   - Lipid panel reflex to direct LDL Fasting    2. Need for hepatitis C screening test    - Hepatitis C Screen Reflex to HCV RNA Quant and Genotype    3. Screening for HIV (human immunodeficiency virus)    - HIV Screening    4. Need for prophylactic vaccination and inoculation against influenza    - FLU VACCINE, (RIV4) RECOMBINANT HA  , IM (FluBlok, egg free) [05969]- >18 YRS (FMG recommended  50-64 YRS)  - Vaccine Administration, Initial [83612]    Follow up with cardiology for his CAD in 1 month.    Jody Blackburn MD  [facility]  Answers for HPI/ROS submitted by the patient on 11/13/2018   PHQ-2 Score: 0      Injectable Influenza Immunization Documentation    1.  Is the person to be vaccinated sick today?   No    2. Does the person to be vaccinated have an allergy to a component   of the vaccine?   No  Egg Allergy Algorithm Link    3. Has the person to be vaccinated ever had a serious reaction   to influenza vaccine in the past?   No    4. Has the person to be vaccinated ever had Guillain-Barré syndrome?   No    Form completed by Radha Ortiz CMA           "

## 2018-11-14 NOTE — MR AVS SNAPSHOT
After Visit Summary   11/14/2018    Kevin Simeon    MRN: [id number]           Patient Information     Date Of Birth          1960        Visit Information        Provider Department      11/14/2018 7:45 AM Jody Blackburn MD [facility]        Today's Diagnoses     Hyperlipidemia LDL goal <160        Need for hepatitis C screening test        Screening for HIV (human immunodeficiency virus)           Follow-ups after your visit        Follow-up notes from your care team     Return in about 1 year (around 11/14/2019).      Your next 10 appointments already scheduled     Nov 26, 2018  9:00 AM CST   Ech Stress Test with RHSTRESS   [facility] ([facility])    [address]  [facility] [address]   [phone]           1. Please bring or wear a comfortable two-piece outfit and walking shoes. 2. Stop eating 3 hours before the test. You may drink water or juice. 3. Stop all caffeine 12 hours before the test. This includes coffee, tea, soda pop, chocolate and certain medicines (such as Anacin and Excederin). Also avoid decaf coffee and tea, as these contain small amounts of caffeine. 4. No alcohol, smoking or use of other tobacco products for 12 hours before the test. 5. Refer to your provider instructions to see if you need to stop any medications (such as beta-blockers or nitrates) for this test. 6. For patients with diabetes: - If you take insulin, call your diabetes care team. Ask if you should take a   dose the morning of your test. - If you take diabetes medicine by mouth, dont take it on the morning of your test. Bring it with you to take after the test. (If you have questions, call your diabetes care team) 7. When you arrive, please tell us if: - You have diabetes. - You have taken Viagra, Cialis or Levitra in the past 48 hours. 8. For any questions that cannot be answered, please contact the ordering physician 9. Please do not wear perfumes  "or scented lotions on the day of your exam.            Dec 03, 2018  9:45 AM CST   Return Visit with Noé Tejada MD   [facility] ([facility])    [address]  [facility] [address] [phone]              Who to contact     If you have questions or need follow up information about today's clinic visit or your schedule please contact [facility] directly at [phone].  Normal or non-critical lab and imaging results will be communicated to you by [email or web address], letter or phone within 4 business days after the clinic has received the results. If you do not hear from us within 7 days, please contact the clinic through Freedom2t or phone. If you have a critical or abnormal lab result, we will notify you by phone as soon as possible.  Submit refill requests through Seesearch or call your pharmacy and they will forward the refill request to us. Please allow 3 business days for your refill to be completed.          Additional Information About Your Visit        [email or web address] Information     Seesearch gives you secure access to your electronic health record. If you see a primary care provider, you can also send messages to your care team and make appointments. If you have questions, please call your primary care clinic.  If you do not have a primary care provider, please call [phone] and they will assist you.        Care EveryWhere ID     This is your Care EveryWhere ID. This could be used by other organizations to access your [facility] medical records  [id number]        Your Vitals Were     Pulse Temperature Respirations Height Pulse Oximetry BMI (Body Mass Index)    76 97.6  F (36.4  C) (Oral) 16 5' 11\" (1.803 m) 98% 24.97 kg/m2       Blood Pressure from Last 3 Encounters:   11/14/18 130/82   04/30/18 126/70   12/04/17 128/80    Weight from Last 3 Encounters:   11/14/18 179 lb (81.2 kg)   04/30/18 183 lb 1.6 oz (83.1 kg)   12/28/17 " 179 lb (81.2 kg)              We Performed the Following     Hepatitis C Screen Reflex to HCV RNA Quant and Genotype     HIV Screening     Lipid panel reflex to direct LDL Fasting          Where to get your medicines      These medications were sent to [facility] - [place], [place] - [address]   [address] , [place] [address]     Phone:  [phone]     simvastatin 40 MG tablet          Primary Care Provider Office Phone # Fax #    Jody Blackburn [phone] [address] [address] [address]        Equal Access to Services     [facility]: Hadii aad ku hadasho Soomaali, waaxda luqadaha, qaybta kaalmada adeabelyadavid, carlos persaud . So [facility] [phone].    ATENCIÓN: Si habla español, tiene a evans disposición servicios gratuitos de asistencia lingüística. [facility] [phone].    We comply with applicable federal civil rights laws and [place] laws. We do not discriminate on the basis of race, color, national origin, age, disability, sex, sexual orientation, or gender identity.            Thank you!     Thank you for choosing [facility]  for your care. Our goal is always to provide you with excellent care. Hearing back from our patients is one way we can continue to improve our services. Please take a few minutes to complete the written survey that you may receive in the mail after your visit with us. Thank you!             Your Updated Medication List - Protect others around you: Learn how to safely use, store and throw away your medicines at www.disposemymeds.org.          This list is accurate as of 11/14/18  8:08 AM.  Always use your most recent med list.                   Brand Name Dispense Instructions for use Diagnosis    allopurinol 300 MG tablet    ZYLOPRIM     Take 300 mg by mouth daily        aspirin 81 MG EC tablet     90 tablet    Take 1 tablet (81 mg) by mouth daily Start tomorrow  morning.    Coronary artery disease involving native heart, angina presence unspecified, unspecified vessel or lesion type, Status post coronary angioplasty       clopidogrel 75 MG tablet    PLAVIX    90 tablet    Take 1 tablet (75 mg) by mouth daily    Coronary artery disease involving native heart, angina presence unspecified, unspecified vessel or lesion type, Status post coronary angioplasty       ibuprofen 200 MG tablet    ADVIL/MOTRIN     Take 200 mg by mouth every 4 hours as needed for mild pain        lisinopril 10 MG tablet    PRINIVIL/ZESTRIL    90 tablet    Take 1 tablet (10 mg) by mouth daily    Benign essential hypertension       metoprolol succinate 50 MG 24 hr tablet    TOPROL-XL    60 tablet    Take 1 tablet (50 mg) by mouth 2 times daily    Benign essential hypertension       PREDNISONE PO      Take 5 mg by mouth daily        simvastatin 40 MG tablet    ZOCOR    90 tablet    Take 1 tablet (40 mg) by mouth At Bedtime    Hyperlipidemia LDL goal <160

## 2018-11-26 ENCOUNTER — HOSPITAL ENCOUNTER (OUTPATIENT)
Dept: CARDIOLOGY | Facility: CLINIC | Age: 58
Discharge: HOME | End: 2018-11-26
Attending: INTERNAL MEDICINE | Admitting: INTERNAL MEDICINE
Payer: COMMERCIAL

## 2018-11-26 DIAGNOSIS — I25.10 CORONARY ARTERY DISEASE INVOLVING NATIVE CORONARY ARTERY OF NATIVE HEART WITHOUT ANGINA PECTORIS: ICD-10-CM

## 2018-11-26 DIAGNOSIS — Z98.61 STATUS POST CORONARY ANGIOPLASTY: ICD-10-CM

## 2018-11-26 PROCEDURE — 93321 DOPPLER ECHO F-UP/LMTD STD: CPT | Mod: TC

## 2018-11-26 PROCEDURE — 93016 CV STRESS TEST SUPVJ ONLY: CPT | Performed by: INTERNAL MEDICINE

## 2018-11-26 PROCEDURE — 93018 CV STRESS TEST I&R ONLY: CPT | Performed by: INTERNAL MEDICINE

## 2018-11-26 PROCEDURE — 93321 DOPPLER ECHO F-UP/LMTD STD: CPT | Mod: 26 | Performed by: INTERNAL MEDICINE

## 2018-11-26 PROCEDURE — 93350 STRESS TTE ONLY: CPT | Mod: 26 | Performed by: INTERNAL MEDICINE

## 2018-11-26 PROCEDURE — 25500064 ZZH RX 255 OP 636: Performed by: INTERNAL MEDICINE

## 2018-11-26 PROCEDURE — 93325 DOPPLER ECHO COLOR FLOW MAPG: CPT | Mod: 26 | Performed by: INTERNAL MEDICINE

## 2018-11-26 RX ADMIN — HUMAN ALBUMIN MICROSPHERES AND PERFLUTREN 5 ML: 10; .22 INJECTION, SOLUTION INTRAVENOUS at 09:15

## 2018-11-27 ENCOUNTER — TELEPHONE (OUTPATIENT)
Dept: CARDIOLOGY | Facility: CLINIC | Age: 58
End: 2018-11-27

## 2018-11-27 DIAGNOSIS — I25.10 CAD (CORONARY ARTERY DISEASE): Primary | ICD-10-CM

## 2018-11-27 NOTE — TELEPHONE ENCOUNTER
Notes Recorded by Noé Tejada MD on 11/26/2018 at 9:38 PM  Normal stress echo.  OK to stop his clopidogrel but continue ASA 81 mg daily.  Noé Tejada MD, [facility]  November 26, 2018 9:38 PM    OV 4/30/18 with Dr. Tejada   ASSESSMENT:   1.  Coronary artery disease.  The patient is status post intracoronary stenting with a drug-eluting stent to the subtotal first diagonal branch artery.  He has been free from angina since that time.  We continue with his current medications.  His blood pressure seems reasonably well controlled.  He will remain on aspirin and Plavix until October.  We will perform a stress echocardiogram in October to assess any cardiac ischemia.  If his stress echocardiogram in October is normal we will stop his Plavix and my plan is to see him 1 year later.  2.  Mixed hyperlipidemia currently well controlled with simvastatin.  A fasting lipid profile should be again performed in October of this year.  This can be performed through his primary care physician.     Tried to call patient. No answer. Left detailed VM informing patient that stress echo was normal and he can stop the clopidogrel but to stay on ASA 81 mg daily. Pt scheduled to see Dr. Tejada 12/3/18. Left patients scheduling number if he would like to cancel OV since stress echo was normal and that we can see him in 1 year. Left team 4 direct number to call with any questions or concerns.   Order placed for patient to follow up with Dr. Tejada in 1 year. Med list updated.

## 2019-01-08 ENCOUNTER — MYC REFILL (OUTPATIENT)
Dept: CARDIOLOGY | Facility: CLINIC | Age: 59
End: 2019-01-08

## 2019-01-08 DIAGNOSIS — I10 BENIGN ESSENTIAL HYPERTENSION: ICD-10-CM

## 2019-01-08 RX ORDER — METOPROLOL SUCCINATE 50 MG/1
50 TABLET, EXTENDED RELEASE ORAL 2 TIMES DAILY
Qty: 60 TABLET | Refills: 0 | Status: CANCELLED | OUTPATIENT
Start: 2019-01-08

## 2019-01-08 RX ORDER — LISINOPRIL 10 MG/1
10 TABLET ORAL DAILY
Qty: 90 TABLET | Refills: 0 | Status: SHIPPED | OUTPATIENT
Start: 2019-01-08 | End: 2019-01-09

## 2019-01-09 ENCOUNTER — TELEPHONE (OUTPATIENT)
Dept: CARDIOLOGY | Facility: CLINIC | Age: 59
End: 2019-01-09

## 2019-01-09 DIAGNOSIS — I10 BENIGN ESSENTIAL HYPERTENSION: ICD-10-CM

## 2019-01-09 RX ORDER — METOPROLOL SUCCINATE 50 MG/1
50 TABLET, EXTENDED RELEASE ORAL 2 TIMES DAILY
Qty: 180 TABLET | Refills: 3 | Status: SHIPPED | OUTPATIENT
Start: 2019-01-09 | End: 2020-02-17

## 2019-01-09 RX ORDER — LISINOPRIL 10 MG/1
10 TABLET ORAL DAILY
Qty: 30 TABLET | Refills: 0 | Status: SHIPPED | OUTPATIENT
Start: 2019-01-09 | End: 2019-01-09

## 2019-01-09 RX ORDER — LISINOPRIL 10 MG/1
10 TABLET ORAL DAILY
Qty: 90 TABLET | Refills: 3 | Status: SHIPPED | OUTPATIENT
Start: 2019-01-09 | End: 2020-02-17

## 2019-01-09 RX ORDER — METOPROLOL SUCCINATE 50 MG/1
50 TABLET, EXTENDED RELEASE ORAL 2 TIMES DAILY
Qty: 60 TABLET | Refills: 0 | Status: SHIPPED | OUTPATIENT
Start: 2019-01-09 | End: 2019-01-09

## 2019-01-09 NOTE — TELEPHONE ENCOUNTER
KleberKrey[employer] message received.   Reviewed refill request with patient over the phone.     Dr. Tejada's OV notes from 04-30-18 reviewed.     Toprol XL 50 mg BID  Prescriptions sent...  - 60 tablets/no refills to [facility]  - 180 tablets/3 refills to [facility]    Lisinopril 10 mg/d   Prescriptions sent.....  - 30 tablets/no refills to [facility]  - 90 tablets/3 refills to [facility].     TGarbers RN

## 2019-12-09 ENCOUNTER — OFFICE VISIT (OUTPATIENT)
Dept: CARDIOLOGY | Facility: CLINIC | Age: 59
End: 2019-12-09
Attending: INTERNAL MEDICINE
Payer: COMMERCIAL

## 2019-12-09 VITALS
HEART RATE: 92 BPM | BODY MASS INDEX: 25.9 KG/M2 | SYSTOLIC BLOOD PRESSURE: 128 MMHG | OXYGEN SATURATION: 96 % | DIASTOLIC BLOOD PRESSURE: 62 MMHG | HEIGHT: 71 IN | WEIGHT: 185 LBS

## 2019-12-09 DIAGNOSIS — I10 BENIGN ESSENTIAL HYPERTENSION: ICD-10-CM

## 2019-12-09 DIAGNOSIS — E78.2 MIXED HYPERLIPIDEMIA: ICD-10-CM

## 2019-12-09 DIAGNOSIS — I25.10 CORONARY ARTERY DISEASE INVOLVING NATIVE CORONARY ARTERY OF NATIVE HEART WITHOUT ANGINA PECTORIS: Primary | ICD-10-CM

## 2019-12-09 DIAGNOSIS — Z95.5 STATUS POST INSERTION OF DRUG ELUTING CORONARY ARTERY STENT: ICD-10-CM

## 2019-12-09 PROCEDURE — 99214 OFFICE O/P EST MOD 30 MIN: CPT | Performed by: INTERNAL MEDICINE

## 2019-12-09 ASSESSMENT — MIFFLIN-ST. JEOR: SCORE: 1676.28

## 2019-12-09 NOTE — LETTER
12/9/2019      Jody Blackburn MD  [address] [address] [address]      RE: Kevin Simeon       Dear Colleague,    I had the pleasure of seeing Kevin Simeon in the [facility].    Service Date: 12/09/2019      PRIMARY CARE PHYSICIAN:  Jody Blackburn MD      HISTORY OF PRESENT ILLNESS:  I again had the pleasure of seeing your patient, Kevin Simeon, at [facility] in [place] for evaluation of coronary artery disease.  This patient had exertional left shoulder pain and shortness of breath with an abnormal stress echocardiogram.  Coronary angiography showed evidence of a subtotal first diagonal vessel stenosis.  Vessel was stented on 10/10/2017 with no significant residual stenosis.  He also had a 70% mid right coronary artery stenosis.  Of note, his LAD and circumflex arteries had separate ostium without a common left main.  The patient has done well with exercise, walking his dog twice a day for 20 minutes on weekdays and 60 minutes on weekends.  He occasionally speeds up for a few minutes but does not generally have a lot of intensity to his walking as his dog is 14 years old.  His diet has changed including less red meat and more salads.  He denies bleeding diathesis.  He has a tendency for tachycardia and we have regulated his heart rate with Toprol-XL.  He is currently on a moderate dose of Toprol-XL still with a heart rate of 92 beats per minute.  I have asked him to increase the intensity of his exercise.  He denies palpitations, syncope or presyncope.  He also denies recurrent angina, PND, orthopnea or peripheral edema.  No hospitalizations or surgery since I saw him 1 year ago.  He works as an  and stays quite busy at his occupation.      PHYSICAL EXAMINATION:  Listed below.      ASSESSMENT/PLAN:   1.  Coronary artery disease.  The patient is status post intracoronary stenting with a drug-eluting stent to the subtotal first diagonal branch artery.  He has been  free of angina since then.  We continue to treat medically.  His blood pressure seems reasonably well-controlled.  He remains on aspirin, Toprol and lisinopril.  He has a home blood pressure cuff and I have asked him to take his blood pressure 3 times a week, morning and evening and any time during the weekend.   2.  Mixed hyperlipidemia with elevated triglycerides.  This will be rechecked in the next month.  The patient believes that his triglyceride elevation previously was due to diet and he has made improvements in that.   3.  Hypertension, very well-controlled.      It is my pleasure to assist in the care of Kevin Ferrari.  All his questions were answered to his satisfaction.  I will see him again in 1 year or earlier on a p.r.n. basis.      Bárbara Mae MD      cc:   Jody Blackburn MD   [address] [address]         BÁRBARA MAE MD, [facility]             D: 2019   T: 2019   MT: al      Name:     KEVIN FERRARI   MRN:      [id number]        Account:      [id number]   :      1960           Service Date: 2019      Document: [id number]         Outpatient Encounter Medications as of 2019   Medication Sig Dispense Refill     allopurinol (ZYLOPRIM) 300 MG tablet Take 300 mg by mouth daily       aspirin 81 MG EC tablet Take 1 tablet (81 mg) by mouth daily Start tomorrow morning. 90 tablet 3     lisinopril (PRINIVIL/ZESTRIL) 10 MG tablet Take 1 tablet (10 mg) by mouth daily 90 tablet 3     metoprolol succinate ER (TOPROL-XL) 50 MG 24 hr tablet Take 1 tablet (50 mg) by mouth 2 times daily 180 tablet 3     PREDNISONE PO Take 5 mg by mouth as needed        simvastatin (ZOCOR) 40 MG tablet Take 1 tablet (40 mg) by mouth At Bedtime 90 tablet 3     [DISCONTINUED] ibuprofen (ADVIL/MOTRIN) 200 MG tablet Take 200 mg by mouth every 4 hours as needed for mild pain       No facility-administered encounter medications on file as of 2019.         Again, thank you for allowing me to participate in the care of your patient.      Sincerely,    Noé Tejada MD     [facility]

## 2019-12-09 NOTE — LETTER
12/9/2019    Jody Blackburn MD  [address]  [facility] [address]    RE: Kevin Simeon       Dear Colleague,    I had the pleasure of seeing Kevin Simeon in the [facility].    HPI and Plan:   See dictation:[id number]    Orders Placed This Encounter   Procedures     Follow-Up with Cardiologist       No orders of the defined types were placed in this encounter.      Medications Discontinued During This Encounter   Medication Reason     ibuprofen (ADVIL/MOTRIN) 200 MG tablet Stopped by Patient         Encounter Diagnoses   Name Primary?     Coronary artery disease involving native coronary artery of native heart without angina pectoris Yes     Status post insertion of drug eluting coronary artery stent      Benign essential hypertension      Mixed hyperlipidemia        CURRENT MEDICATIONS:  Current Outpatient Medications   Medication Sig Dispense Refill     allopurinol (ZYLOPRIM) 300 MG tablet Take 300 mg by mouth daily       aspirin 81 MG EC tablet Take 1 tablet (81 mg) by mouth daily Start tomorrow morning. 90 tablet 3     lisinopril (PRINIVIL/ZESTRIL) 10 MG tablet Take 1 tablet (10 mg) by mouth daily 90 tablet 3     metoprolol succinate ER (TOPROL-XL) 50 MG 24 hr tablet Take 1 tablet (50 mg) by mouth 2 times daily 180 tablet 3     PREDNISONE PO Take 5 mg by mouth as needed        simvastatin (ZOCOR) 40 MG tablet Take 1 tablet (40 mg) by mouth At Bedtime 90 tablet 3       ALLERGIES     Allergies   Allergen Reactions     Penicillins        PAST MEDICAL HISTORY:  Past Medical History:   Diagnosis Date     Campylobacter diarrhea 3/19/2012     DISH (diffuse idiopathic skeletal hyperostosis) 9/2/2016     Hyperlipidemia LDL goal <130 4/25/2017     Inflammatory arthritis 3/14/2012     Joint swelling 9/12/2013     Seborrheic keratosis 9/2/2016       PAST SURGICAL HISTORY:  Past Surgical History:   Procedure Laterality Date     HC LEFT HEART CATHETERIZATION  10/10/2017    percutaneous coronary  intervention first diagonal branch of LAD: 32 x 2.25 mm length everolimus eluting Promus premiere stent       FAMILY HISTORY:  Family History   Problem Relation Age of Onset     Heart Disease Mother      Aneurysm Father      Other - See Comments Father         bipass in kidney area     Hypertension Brother        SOCIAL HISTORY:  Social History     Socioeconomic History     Marital status:      Spouse name: None     Number of children: None     Years of education: None     Highest education level: None   Occupational History     None   Social Needs     Financial resource strain: None     Food insecurity:     Worry: None     Inability: None     Transportation needs:     Medical: None     Non-medical: None   Tobacco Use     Smoking status: Never Smoker     Smokeless tobacco: Never Used   Substance and Sexual Activity     Alcohol use: Yes     Alcohol/week: 0.0 standard drinks     Comment: 15 beers per week     Drug use: No     Sexual activity: Yes     Partners: Female   Lifestyle     Physical activity:     Days per week: None     Minutes per session: None     Stress: None   Relationships     Social connections:     Talks on phone: None     Gets together: None     Attends Religion service: None     Active member of club or organization: None     Attends meetings of clubs or organizations: None     Relationship status: None     Intimate partner violence:     Fear of current or ex partner: None     Emotionally abused: None     Physically abused: None     Forced sexual activity: None   Other Topics Concern     None   Social History Narrative     None       Review of Systems:  Skin:  Negative       Eyes:  Positive for glasses reading  ENT:  Positive for      Respiratory:  Negative       Cardiovascular:  Negative      Gastroenterology: Negative      Genitourinary:  Negative      Musculoskeletal:  Positive for gout;joint pain;joint swelling;joint stiffness;back pain;arthritis bursitis in shoulder and left hip  "  Neurologic:  Positive for numbness or tingling of hands hands occasionally   Psychiatric:  Negative      Heme/Lymph/Imm:  Negative      Endocrine:  Negative        Physical Exam:  Vitals: /62 (BP Location: Right arm, Patient Position: Sitting, Cuff Size: Adult Large)   Pulse 92   Ht 1.803 m (5' 11\")   Wt 83.9 kg (185 lb)   SpO2 96%   BMI 25.80 kg/m       Constitutional:  cooperative, alert and oriented, well developed, well nourished, in no acute distress        Skin:  warm and dry to the touch, no apparent skin lesions or masses noted          Head:  normocephalic, no masses or lesions        Eyes:  pupils equal and round;conjunctivae and lids unremarkable;sclera white;no xanthalasma;EOMS intact        Lymph:      ENT:  no pallor or cyanosis, dentition good        Neck:  carotid pulses are full and equal bilaterally, JVP normal, no carotid bruit        Respiratory:  normal breath sounds, clear to auscultation, normal A-P diameter, normal symmetry, normal respiratory excursion, no use of accessory muscles         Cardiac: regular rhythm, normal S1/S2, no S3 or S4, apical impulse not displaced, no murmurs, gallops or rubs tachycardic   no presence of murmur          pulses full and equal, no bruits auscultated                                        GI:  abdomen soft, non-tender, BS normoactive, no mass, no HSM, no bruits        Extremities and Muscular Skeletal:  no deformities, clubbing, cyanosis, erythema observed;no edema              Neurological:  no gross motor deficits;affect appropriate        Psych:  Alert and Oriented x 3        CC  Jody Blackburn MD  [address] [id number]                Thank you for allowing me to participate in the care of your patient.      Sincerely,     Noé Tejada MD     [facility]    cc:   Jody Blackburn MD  [address] [place] [address]        "

## 2019-12-09 NOTE — PROGRESS NOTES
Service Date: 12/09/2019      PRIMARY CARE PHYSICIAN:  Jody Blackburn MD      HISTORY OF PRESENT ILLNESS:  I again had the pleasure of seeing your patient, Kevin Simeon, at [facility] in [place] for evaluation of coronary artery disease.  This patient had exertional left shoulder pain and shortness of breath with an abnormal stress echocardiogram.  Coronary angiography showed evidence of a subtotal first diagonal vessel stenosis.  Vessel was stented on 10/10/2017 with no significant residual stenosis.  He also had a 70% mid right coronary artery stenosis.  Of note, his LAD and circumflex arteries had separate ostium without a common left main.  The patient has done well with exercise, walking his dog twice a day for 20 minutes on weekdays and 60 minutes on weekends.  He occasionally speeds up for a few minutes but does not generally have a lot of intensity to his walking as his dog is 14 years old.  His diet has changed including less red meat and more salads.  He denies bleeding diathesis.  He has a tendency for tachycardia and we have regulated his heart rate with Toprol-XL.  He is currently on a moderate dose of Toprol-XL still with a heart rate of 92 beats per minute.  I have asked him to increase the intensity of his exercise.  He denies palpitations, syncope or presyncope.  He also denies recurrent angina, PND, orthopnea or peripheral edema.  No hospitalizations or surgery since I saw him 1 year ago.  He works as an  and stays quite busy at his occupation.      PHYSICAL EXAMINATION:  Listed below.      ASSESSMENT/PLAN:   1.  Coronary artery disease.  The patient is status post intracoronary stenting with a drug-eluting stent to the subtotal first diagonal branch artery.  He has been free of angina since then.  We continue to treat medically.  His blood pressure seems reasonably well-controlled.  He remains on aspirin, Toprol and lisinopril.  He has a home blood pressure cuff and I have  asked him to take his blood pressure 3 times a week, morning and evening and any time during the weekend.   2.  Mixed hyperlipidemia with elevated triglycerides.  This will be rechecked in the next month.  The patient believes that his triglyceride elevation previously was due to diet and he has made improvements in that.   3.  Hypertension, very well-controlled.      It is my pleasure to assist in the care of Kevin Ferrari.  All his questions were answered to his satisfaction.  I will see him again in 1 year or earlier on a p.r.n. basis.      Bárbara Mae MD      cc:   Jody Blackburn MD   [address] [address]         B[facility]ARA MAE MD, [facility]             D: 2019   T: 2019   MT: al      Name:     KEVIN FERRARI   MRN:      [id number]        Account:      [id number]   :      1960           Service Date: 2019      Document: [id number]

## 2019-12-09 NOTE — PROGRESS NOTES
HPI and Plan:   See dictation:[id number]    Orders Placed This Encounter   Procedures     Follow-Up with Cardiologist       No orders of the defined types were placed in this encounter.      Medications Discontinued During This Encounter   Medication Reason     ibuprofen (ADVIL/MOTRIN) 200 MG tablet Stopped by Patient         Encounter Diagnoses   Name Primary?     Coronary artery disease involving native coronary artery of native heart without angina pectoris Yes     Status post insertion of drug eluting coronary artery stent      Benign essential hypertension      Mixed hyperlipidemia        CURRENT MEDICATIONS:  Current Outpatient Medications   Medication Sig Dispense Refill     allopurinol (ZYLOPRIM) 300 MG tablet Take 300 mg by mouth daily       aspirin 81 MG EC tablet Take 1 tablet (81 mg) by mouth daily Start tomorrow morning. 90 tablet 3     lisinopril (PRINIVIL/ZESTRIL) 10 MG tablet Take 1 tablet (10 mg) by mouth daily 90 tablet 3     metoprolol succinate ER (TOPROL-XL) 50 MG 24 hr tablet Take 1 tablet (50 mg) by mouth 2 times daily 180 tablet 3     PREDNISONE PO Take 5 mg by mouth as needed        simvastatin (ZOCOR) 40 MG tablet Take 1 tablet (40 mg) by mouth At Bedtime 90 tablet 3       ALLERGIES     Allergies   Allergen Reactions     Penicillins        PAST MEDICAL HISTORY:  Past Medical History:   Diagnosis Date     Campylobacter diarrhea 3/19/2012     DISH (diffuse idiopathic skeletal hyperostosis) 9/2/2016     Hyperlipidemia LDL goal <130 4/25/2017     Inflammatory arthritis 3/14/2012     Joint swelling 9/12/2013     Seborrheic keratosis 9/2/2016       PAST SURGICAL HISTORY:  Past Surgical History:   Procedure Laterality Date     HC LEFT HEART CATHETERIZATION  10/10/2017    percutaneous coronary intervention first diagonal branch of LAD: 32 x 2.25 mm length everolimus eluting Promus premiere stent       FAMILY HISTORY:  Family History   Problem Relation Age of Onset     Heart Disease Mother      Aneurysm  Father      Other - See Comments Father         bipass in kidney area     Hypertension Brother        SOCIAL HISTORY:  Social History     Socioeconomic History     Marital status:      Spouse name: None     Number of children: None     Years of education: None     Highest education level: None   Occupational History     None   Social Needs     Financial resource strain: None     Food insecurity:     Worry: None     Inability: None     Transportation needs:     Medical: None     Non-medical: None   Tobacco Use     Smoking status: Never Smoker     Smokeless tobacco: Never Used   Substance and Sexual Activity     Alcohol use: Yes     Alcohol/week: 0.0 standard drinks     Comment: 15 beers per week     Drug use: No     Sexual activity: Yes     Partners: Female   Lifestyle     Physical activity:     Days per week: None     Minutes per session: None     Stress: None   Relationships     Social connections:     Talks on phone: None     Gets together: None     Attends Yazidi service: None     Active member of club or organization: None     Attends meetings of clubs or organizations: None     Relationship status: None     Intimate partner violence:     Fear of current or ex partner: None     Emotionally abused: None     Physically abused: None     Forced sexual activity: None   Other Topics Concern     None   Social History Narrative     None       Review of Systems:  Skin:  Negative       Eyes:  Positive for glasses reading  ENT:  Positive for      Respiratory:  Negative       Cardiovascular:  Negative      Gastroenterology: Negative      Genitourinary:  Negative      Musculoskeletal:  Positive for gout;joint pain;joint swelling;joint stiffness;back pain;arthritis bursitis in shoulder and left hip   Neurologic:  Positive for numbness or tingling of hands hands occasionally   Psychiatric:  Negative      Heme/Lymph/Imm:  Negative      Endocrine:  Negative        Physical Exam:  Vitals: /62 (BP Location: Right  "arm, Patient Position: Sitting, Cuff Size: Adult Large)   Pulse 92   Ht 1.803 m (5' 11\")   Wt 83.9 kg (185 lb)   SpO2 96%   BMI 25.80 kg/m      Constitutional:  cooperative, alert and oriented, well developed, well nourished, in no acute distress        Skin:  warm and dry to the touch, no apparent skin lesions or masses noted          Head:  normocephalic, no masses or lesions        Eyes:  pupils equal and round;conjunctivae and lids unremarkable;sclera white;no xanthalasma;EOMS intact        Lymph:      ENT:  no pallor or cyanosis, dentition good        Neck:  carotid pulses are full and equal bilaterally, JVP normal, no carotid bruit        Respiratory:  normal breath sounds, clear to auscultation, normal A-P diameter, normal symmetry, normal respiratory excursion, no use of accessory muscles         Cardiac: regular rhythm, normal S1/S2, no S3 or S4, apical impulse not displaced, no murmurs, gallops or rubs tachycardic   no presence of murmur          pulses full and equal, no bruits auscultated                                        GI:  abdomen soft, non-tender, BS normoactive, no mass, no HSM, no bruits        Extremities and Muscular Skeletal:  no deformities, clubbing, cyanosis, erythema observed;no edema              Neurological:  no gross motor deficits;affect appropriate        Psych:  Alert and Oriented x 3        CC  Jody Blackburn MD  [address] [place] [address]              "

## 2020-02-17 DIAGNOSIS — I10 BENIGN ESSENTIAL HYPERTENSION: ICD-10-CM

## 2020-02-17 RX ORDER — LISINOPRIL 10 MG/1
10 TABLET ORAL DAILY
Qty: 90 TABLET | Refills: 3 | Status: SHIPPED | OUTPATIENT
Start: 2020-02-17 | End: 2021-02-23

## 2020-02-17 RX ORDER — METOPROLOL SUCCINATE 50 MG/1
50 TABLET, EXTENDED RELEASE ORAL 2 TIMES DAILY
Qty: 180 TABLET | Refills: 3 | Status: SHIPPED | OUTPATIENT
Start: 2020-02-17 | End: 2021-02-23

## 2020-02-17 NOTE — TELEPHONE ENCOUNTER
Patient requested refills for Metoprolol Succinate 50mg, Lisinopril 10mg to be sent to [facility] in [facility]. LOV 12/9/19. 90 days supply with 3 refills sent. CORETTA Shell RN

## 2020-02-18 ENCOUNTER — MYC REFILL (OUTPATIENT)
Dept: FAMILY MEDICINE | Facility: CLINIC | Age: 60
End: 2020-02-18

## 2020-02-18 DIAGNOSIS — E78.5 HYPERLIPIDEMIA LDL GOAL <160: ICD-10-CM

## 2020-02-19 RX ORDER — SIMVASTATIN 40 MG
40 TABLET ORAL AT BEDTIME
Qty: 30 TABLET | Refills: 0 | Status: SHIPPED | OUTPATIENT
Start: 2020-02-19 | End: 2020-04-07

## 2020-02-19 NOTE — TELEPHONE ENCOUNTER
"Routing refill request to provider for review/approval because:  Labs not current:  LDL  A break in medication  Patient needs to be seen because it has been more than 1 year since last office visit.  T'd up 1 month for provider review.        Requested Prescriptions   Pending Prescriptions Disp Refills     simvastatin 40 MG PO tablet 90 tablet 3     Sig: Take 1 tablet (40 mg) by mouth At Bedtime   Last Written Prescription Date:  11/14/2018  Last Fill Quantity: 90,  # refills: 3   Last office visit: 11/14/2018 with prescribing provider:     Future Office Visit:        Statins Protocol Failed - 2/18/2020  6:39 AM        Failed - LDL on file in past 12 months     Recent Labs   Lab Test 11/14/18  0818   LDL 77           Failed - Recent (12 mo) or future (30 days) visit within the authorizing provider's specialty     Patient has had an office visit with the authorizing provider or a provider within the authorizing providers department within the previous 12 mos or has a future within next 30 days. See \"Patient Info\" tab in inbasket, or \"Choose Columns\" in Meds & Orders section of the refill encounter.              Passed - No abnormal creatine kinase in past 12 months     No lab results found.             Passed - Medication is active on med list        Passed - Patient is age 18 or older      Gayatri [facility] RN      "

## 2020-02-19 NOTE — TELEPHONE ENCOUNTER
Please call, pt needs to be seen.  Jody Blackburn MD  [facility]  [phone]

## 2020-03-02 ENCOUNTER — HEALTH MAINTENANCE LETTER (OUTPATIENT)
Age: 60
End: 2020-03-02

## 2020-04-07 ENCOUNTER — VIRTUAL VISIT (OUTPATIENT)
Dept: FAMILY MEDICINE | Facility: CLINIC | Age: 60
End: 2020-04-07
Payer: COMMERCIAL

## 2020-04-07 DIAGNOSIS — E78.5 HYPERLIPIDEMIA LDL GOAL <160: ICD-10-CM

## 2020-04-07 PROCEDURE — 99213 OFFICE O/P EST LOW 20 MIN: CPT | Mod: TEL | Performed by: FAMILY MEDICINE

## 2020-04-07 RX ORDER — SIMVASTATIN 40 MG
40 TABLET ORAL AT BEDTIME
Qty: 90 TABLET | Refills: 3 | Status: SHIPPED | OUTPATIENT
Start: 2020-04-07 | End: 2020-08-27

## 2020-04-07 NOTE — PROGRESS NOTES
"Subjective     Kevin Simeon is a 59 year old male who is being evaluated via a billable telephone visit.      The patient has been notified of following:     \"This telephone visit will be conducted via a call between you and your physician/provider. We have found that certain health care needs can be provided without the need for a physical exam.  This service lets us provide the care you need with a short phone conversation.  If a prescription is necessary we can send it directly to your pharmacy.  If lab work is needed we can place an order for that and you can then stop by our lab to have the test done at a later time.    If during the course of the call the physician/provider feels a telephone visit is not appropriate, you will not be charged for this service.\"     Patient has given verbal consent for Telephone visit?  Yes    Kevin Simeon complains of   Chief Complaint   Patient presents with     Lipids     Follow up        ALLERGIES  Penicillins    Hyperlipidemia Follow-Up      Are you regularly taking any medication or supplement to lower your cholesterol?   Yes- simvastatin    Are you having muscle aches or other side effects that you think could be caused by your cholesterol lowering medication?  No        Patient Active Problem List   Diagnosis     Bloody diarrhea     CARDIOVASCULAR SCREENING; LDL GOAL LESS THAN 160     Swelling of joint     Inflammatory arthritis     Campylobacter diarrhea     Joint swelling     DISH (diffuse idiopathic skeletal hyperostosis)     Seborrheic keratosis     Benign essential hypertension     Hyperlipidemia LDL goal <130     Abnormal cardiovascular stress test     Coronary artery disease involving native coronary artery of native heart without angina pectoris     Mixed hyperlipidemia     Status post insertion of drug eluting coronary artery stent     Past Surgical History:   Procedure Laterality Date     HC LEFT HEART CATHETERIZATION  10/10/2017    percutaneous coronary " intervention first diagonal branch of LAD: 32 x 2.25 mm length everolimus eluting Promus premiere stent       Social History     Tobacco Use     Smoking status: Never Smoker     Smokeless tobacco: Never Used   Substance Use Topics     Alcohol use: Yes     Alcohol/week: 0.0 standard drinks     Comment: 15 beers per week     Family History   Problem Relation Age of Onset     Heart Disease Mother      Aneurysm Father      Other - See Comments Father         bipass in kidney area     Hypertension Brother          Current Outpatient Medications   Medication Sig Dispense Refill     allopurinol (ZYLOPRIM) 300 MG tablet Take 300 mg by mouth daily       aspirin 81 MG EC tablet Take 1 tablet (81 mg) by mouth daily Start tomorrow morning. 90 tablet 3     lisinopril (ZESTRIL) 10 MG tablet Take 1 tablet (10 mg) by mouth daily 90 tablet 3     metoprolol succinate ER (TOPROL-XL) 50 MG 24 hr tablet Take 1 tablet (50 mg) by mouth 2 times daily 180 tablet 3     PREDNISONE PO Take 5 mg by mouth as needed        simvastatin 40 MG PO tablet Take 1 tablet (40 mg) by mouth At Bedtime Office visit due 30 tablet 0     Allergies   Allergen Reactions     Penicillins      Recent Labs   Lab Test 11/14/18 0818 06/21/18 04/12/18 10/24/17  1307 10/09/17  0733 09/02/16  0928   A1C  --   --   --   --   --  5.1   LDL 77  --   --  53  --  117*   HDL 47  --   --  43  --  40   TRIG 218*  --   --  124  --  224*   ALT  --  49* 41* 65  --   --    CR  --  1.020 0.800 0.85 0.92 0.90   GFRESTIMATED  --  80.0 105.9 >90 85 88   GFRESTBLACK  --   --   --  >90 >90 >90  African American GFR Calc     POTASSIUM  --   --   --  3.9 4.2 5.0   TSH  --   --   --   --   --  2.08        Reviewed and updated as needed this visit by Provider         Review of Systems   ROS COMP: CONSTITUTIONAL: NEGATIVE for fever, chills, change in weight  RESP: NEGATIVE for significant cough or SOB  CV: NEGATIVE for chest pain, palpitations or peripheral edema       Objective   Reported  vitals:  There were no vitals taken for this visit.   healthy, alert and no distress  Psych: Alert and oriented times 3;             Assessment/Plan:  1. Hyperlipidemia LDL goal <160  Controlled, Refill given   - simvastatin (ZOCOR) 40 MG tablet; Take 1 tablet (40 mg) by mouth At Bedtime Office visit due  Dispense: 90 tablet; Refill: 3    There is a spot on the lip that has not changed, but he want to get it checked. Started about 2 1/2 years ago.      Phone call duration:  5 minutes    Jody Blackburn MD

## 2020-07-16 ENCOUNTER — TELEPHONE (OUTPATIENT)
Dept: FAMILY MEDICINE | Facility: CLINIC | Age: 60
End: 2020-07-16

## 2020-07-16 NOTE — TELEPHONE ENCOUNTER
Name: Kevin Simeon MRN: [id number]     Date: 7/22/2020 Status: Scheduled     Time: 7:25 AM Length: 30     Visit Type: PREVENTATIVE - ADULT SB2-4 [2429] Copay: $0.00     Provider: Jody Blackburn MD Department:  FAMILY PRACTICE     Encounter #: [id number] Notes: -MYCHART MESSAGE SENT 7/16 NEEDS TO RESCHEDULE PROVIDER NOT F2F.CC switch to different day please (AA) fasting physical     MYCHART SENT TO PATIENT NEEDS TO RESCHEDULE PROVIDER NOT F2F     Shreya Ladd/

## 2020-08-27 ENCOUNTER — OFFICE VISIT (OUTPATIENT)
Dept: FAMILY MEDICINE | Facility: CLINIC | Age: 60
End: 2020-08-27
Payer: COMMERCIAL

## 2020-08-27 VITALS
WEIGHT: 172.2 LBS | SYSTOLIC BLOOD PRESSURE: 128 MMHG | DIASTOLIC BLOOD PRESSURE: 81 MMHG | HEART RATE: 93 BPM | BODY MASS INDEX: 22.82 KG/M2 | OXYGEN SATURATION: 99 % | RESPIRATION RATE: 16 BRPM | HEIGHT: 73 IN | TEMPERATURE: 98.2 F

## 2020-08-27 DIAGNOSIS — Z00.00 ROUTINE HISTORY AND PHYSICAL EXAMINATION OF ADULT: Primary | ICD-10-CM

## 2020-08-27 DIAGNOSIS — E78.5 HYPERLIPIDEMIA LDL GOAL <160: ICD-10-CM

## 2020-08-27 DIAGNOSIS — D18.01 HEMANGIOMA OF SKIN: ICD-10-CM

## 2020-08-27 DIAGNOSIS — Z86.0100 HISTORY OF COLONIC POLYPS: ICD-10-CM

## 2020-08-27 DIAGNOSIS — I25.10 CORONARY ARTERY DISEASE INVOLVING NATIVE CORONARY ARTERY OF NATIVE HEART WITHOUT ANGINA PECTORIS: ICD-10-CM

## 2020-08-27 DIAGNOSIS — Z12.5 SCREENING FOR PROSTATE CANCER: ICD-10-CM

## 2020-08-27 PROBLEM — E78.2 MIXED HYPERLIPIDEMIA: Status: RESOLVED | Noted: 2017-09-29 | Resolved: 2020-08-27

## 2020-08-27 PROBLEM — R94.39 ABNORMAL CARDIOVASCULAR STRESS TEST: Status: RESOLVED | Noted: 2017-09-29 | Resolved: 2020-08-27

## 2020-08-27 PROCEDURE — 90750 HZV VACC RECOMBINANT IM: CPT | Performed by: FAMILY MEDICINE

## 2020-08-27 PROCEDURE — G0103 PSA SCREENING: HCPCS | Performed by: FAMILY MEDICINE

## 2020-08-27 PROCEDURE — 36415 COLL VENOUS BLD VENIPUNCTURE: CPT | Performed by: FAMILY MEDICINE

## 2020-08-27 PROCEDURE — 80048 BASIC METABOLIC PNL TOTAL CA: CPT | Performed by: FAMILY MEDICINE

## 2020-08-27 PROCEDURE — 90471 IMMUNIZATION ADMIN: CPT | Performed by: FAMILY MEDICINE

## 2020-08-27 PROCEDURE — 80061 LIPID PANEL: CPT | Performed by: FAMILY MEDICINE

## 2020-08-27 RX ORDER — SIMVASTATIN 40 MG
40 TABLET ORAL AT BEDTIME
Qty: 90 TABLET | Refills: 3 | Status: SHIPPED | OUTPATIENT
Start: 2020-08-27 | End: 2021-10-12

## 2020-08-27 ASSESSMENT — ENCOUNTER SYMPTOMS
HEMATURIA: 0
FEVER: 0
JOINT SWELLING: 1
COUGH: 0
MYALGIAS: 1
HEMATOCHEZIA: 0
DYSURIA: 0
FREQUENCY: 0
PARESTHESIAS: 0
HEADACHES: 0
NERVOUS/ANXIOUS: 0
ARTHRALGIAS: 1
EYE PAIN: 0
NAUSEA: 0
ABDOMINAL PAIN: 0
HEARTBURN: 0
SHORTNESS OF BREATH: 0
PALPITATIONS: 0
SORE THROAT: 0
DIARRHEA: 0
WEAKNESS: 0
CHILLS: 0
CONSTIPATION: 0
DIZZINESS: 0

## 2020-08-27 ASSESSMENT — MIFFLIN-ST. JEOR: SCORE: 1646.79

## 2020-08-27 NOTE — PATIENT INSTRUCTIONS
Patient Education     Treating Plantar Fasciitis  First, your healthcare provider tries to find out the cause of your problem. He or she can then suggest ways to ease pain. If your pain is due to poor foot mechanics, occasionally custom-made shoe inserts (orthoses) may help.    Ease symptoms    To relieve mild symptoms, try aspirin, ibuprofen, or other medicines as directed. Rubbing ice on the area may also help.    To lessen severe pain and swelling, your healthcare provider may give you pills or injections. In some cases, you may need a walking cast. Physical therapy, such as ultrasound or a daily stretching program, may also be recommended. Surgery is rarely needed.    To ease symptoms caused by poor foot mechanics, your foot may be taped. This supports the arch and temporarily controls movement. Night splints may also help by stretching the fascia.    Control movement  If taping helps, your healthcare provider may prescribe orthoses. These are inserts built from plaster casts of your feet. They control the way your foot moves. As a result, your symptoms may go away.  Reduce overuse  Every time your foot strikes the ground, the plantar fascia is stretched. You can lessen the strain on the plantar fascia and the chance of overuse by:    Losing any excess weight    Not running on hard or uneven ground    Using orthoses, if recommended, in your shoes and house slippers  If surgery is needed  Your healthcare provider may consider surgery if other types of treatment don't control your pain. During surgery, the plantar fascia is partially cut to release tension. As you heal, fibrous tissue fills the space between the heel bone and the plantar fascia.  Date Last Reviewed: 1/1/2018 2000-2019 [employer]. [address] [place] [address], [place] [address]. All rights reserved. This information is not intended as a substitute for professional medical care. Always follow your healthcare professional's  instructions.

## 2020-08-27 NOTE — PROGRESS NOTES
SUBJECTIVE:   CC: Kevin Simeon is an 59 year old male who presents for preventative health visit.     Healthy Habits:     Getting at least 3 servings of Calcium per day:  NO    Bi-annual eye exam:  Yes    Dental care twice a year:  Yes    Sleep apnea or symptoms of sleep apnea:  None    Diet:  Regular (no restrictions)    Frequency of exercise:  6-7 days/week    Duration of exercise:  15-30 minutes    Taking medications regularly:  Yes    Medication side effects:  None    PHQ-2 Total Score: 1    Additional concerns today:  Yes          Patient has a sore on his left side on the bottom of lip. Left foot has been bothering him really bad.     Today's PHQ-2 Score:   PHQ-2 ( 1999 Pf[employer]) 8/27/2020   Q1: Little interest or pleasure in doing things 1   Q2: Feeling down, depressed or hopeless 0   PHQ-2 Score 1   Q1: Little interest or pleasure in doing things Several days   Q2: Feeling down, depressed or hopeless Not at all   PHQ-2 Score 1       Abuse: Current or Past(Physical, Sexual or Emotional)- No  Do you feel safe in your environment? Yes    Have you ever done Advance Care Planning? (For example, a Health Directive, POLST, or a discussion with a medical provider or your loved ones about your wishes):     Social History     Tobacco Use     Smoking status: Never Smoker     Smokeless tobacco: Never Used   Substance Use Topics     Alcohol use: Yes     Alcohol/week: 0.0 standard drinks     Comment: 15 beers per week         Alcohol Use 8/27/2020   Prescreen: >3 drinks/day or >7 drinks/week? No   Prescreen: >3 drinks/day or >7 drinks/week? -       Last PSA:   PSA   Date Value Ref Range Status   06/03/2015 1.62 0 - 4 ug/L Final       Reviewed orders with patient. Reviewed health maintenance and updated orders accordingly - Yes  Lab work is in process  Labs reviewed in EPIC  BP Readings from Last 3 Encounters:   08/27/20 128/81   12/09/19 128/62   11/14/18 130/82    Wt Readings from Last 3 Encounters:   08/27/20 78.1 kg (172  lb 3.2 oz)   12/09/19 83.9 kg (185 lb)   11/14/18 81.2 kg (179 lb)                  Patient Active Problem List   Diagnosis     CARDIOVASCULAR SCREENING; LDL GOAL LESS THAN 160     Inflammatory arthritis     Campylobacter diarrhea     Joint swelling     DISH (diffuse idiopathic skeletal hyperostosis)     Seborrheic keratosis     Benign essential hypertension     Hyperlipidemia LDL goal <130     Coronary artery disease involving native coronary artery of native heart without angina pectoris     Status post insertion of drug eluting coronary artery stent     Past Surgical History:   Procedure Laterality Date     COLONOSCOPY       HC LEFT HEART CATHETERIZATION  10/10/2017    percutaneous coronary intervention first diagonal branch of LAD: 32 x 2.25 mm length everolimus eluting Promus premiere stent       Social History     Tobacco Use     Smoking status: Never Smoker     Smokeless tobacco: Never Used   Substance Use Topics     Alcohol use: Yes     Alcohol/week: 0.0 standard drinks     Comment: 15 beers per week     Family History   Problem Relation Age of Onset     Heart Disease Mother      Hyperlipidemia Mother      Cerebrovascular Disease Mother      Aneurysm Father      Other - See Comments Father         bipass in kidney area     Hypertension Brother      Diabetes Brother          Current Outpatient Medications   Medication Sig Dispense Refill     allopurinol (ZYLOPRIM) 300 MG tablet Take 300 mg by mouth daily       aspirin 81 MG EC tablet Take 1 tablet (81 mg) by mouth daily Start tomorrow morning. 90 tablet 3     lisinopril (ZESTRIL) 10 MG tablet Take 1 tablet (10 mg) by mouth daily 90 tablet 3     metoprolol succinate ER (TOPROL-XL) 50 MG 24 hr tablet Take 1 tablet (50 mg) by mouth 2 times daily 180 tablet 3     PREDNISONE PO Take 5 mg by mouth as needed        simvastatin (ZOCOR) 40 MG tablet Take 1 tablet (40 mg) by mouth At Bedtime Office visit due 90 tablet 3     Allergies   Allergen Reactions      "Penicillins        Reviewed and updated as needed this visit by clinical staff  Tobacco  Allergies  Med Hx  Surg Hx  Soc Hx        Reviewed and updated as needed this visit by Provider        Past Medical History:   Diagnosis Date     Campylobacter diarrhea 3/19/2012     DISH (diffuse idiopathic skeletal hyperostosis) 9/2/2016     Heart disease      Hyperlipidemia LDL goal <130 4/25/2017     Hypertension      Inflammatory arthritis 3/14/2012     Joint swelling 9/12/2013     Seborrheic keratosis 9/2/2016      Past Surgical History:   Procedure Laterality Date     COLONOSCOPY       HC LEFT HEART CATHETERIZATION  10/10/2017    percutaneous coronary intervention first diagonal branch of LAD: 32 x 2.25 mm length everolimus eluting Promus premiere stent     Vascular Disease Follow-up      How often do you take nitroglycerin? Never    Do you take an aspirin every day? Yes        Review of Systems   Constitutional: Negative for chills and fever.   HENT: Negative for congestion, ear pain, hearing loss and sore throat.    Eyes: Negative for pain and visual disturbance.   Respiratory: Negative for cough and shortness of breath.    Cardiovascular: Negative for chest pain, palpitations and peripheral edema.   Gastrointestinal: Negative for abdominal pain, constipation, diarrhea, heartburn, hematochezia and nausea.   Genitourinary: Negative for discharge, dysuria, frequency, genital sores, hematuria, impotence and urgency.   Musculoskeletal: Positive for arthralgias, joint swelling and myalgias.   Skin: Negative for rash.   Neurological: Negative for dizziness, weakness, headaches and paresthesias.   Psychiatric/Behavioral: Negative for mood changes. The patient is not nervous/anxious.          OBJECTIVE:   /81 (BP Location: Right arm, Patient Position: Sitting, Cuff Size: Adult Regular)   Pulse 93   Temp 98.2  F (36.8  C) (Oral)   Resp 16   Ht 1.849 m (6' 0.8\")   Wt 78.1 kg (172 lb 3.2 oz)   SpO2 99%   BMI 22.84 " "kg/m      Physical Exam  GENERAL: healthy, alert and no distress  EYES: Eyes grossly normal to inspection, PERRL and conjunctivae and sclerae normal  HENT: ear canals and TM's normal, nose and mouth without ulcers or lesions  NECK: no adenopathy, no asymmetry, masses, or scars and thyroid normal to palpation  RESP: lungs clear to auscultation - no rales, rhonchi or wheezes  CV: regular rate and rhythm, normal S1 S2, no S3 or S4, no murmur, click or rub, no peripheral edema and peripheral pulses strong  ABDOMEN: soft, nontender, no hepatosplenomegaly, no masses and bowel sounds normal  MS: no gross musculoskeletal defects noted, no edema  SKIN: small hemangioma on the bottom of the lower lip.  NEURO: Normal strength and tone, mentation intact and speech normal  PSYCH: mentation appears normal, affect normal/bright        ASSESSMENT/PLAN:   1. Hyperlipidemia LDL goal <160  Controlled.   - simvastatin (ZOCOR) 40 MG tablet; Take 1 tablet (40 mg) by mouth At Bedtime Office visit due  Dispense: 90 tablet; Refill: 3    2. Routine history and physical examination of adult  Doing very well.    3. Coronary artery disease involving native coronary artery of native heart without angina pectoris  Stable, denies any complains.  - Lipid panel reflex to direct LDL Fasting  - Basic metabolic panel  (Ca, Cl, CO2, Creat, Gluc, K, Na, BUN)    4. Hemangioma of skin  Of the lower lip. May use drysol as needed.    5. Screening for prostate cancer  check  - Prostate spec antigen screen    6. History of colonic polyps  Noticed on his last colonoscopy, will schedule for   - GASTROENTEROLOGY ADULT REF PROCEDURE ONLY; Future    COUNSELING:   Reviewed preventive health counseling, as reflected in patient instructions       Regular exercise       Healthy diet/nutrition    Estimated body mass index is 22.84 kg/m  as calculated from the following:    Height as of this encounter: 1.849 m (6' 0.8\").    Weight as of this encounter: 78.1 kg (172 lb " 3.2 oz).         He reports that he has never smoked. He has never used smokeless tobacco.      Counseling Resources:  ATP IV Guidelines  Pooled Cohorts Equation Calculator  FRAX Risk Assessment  ICSI Preventive Guidelines  Dietary Guidelines for Americans, 2010  USDA's MyPlate  ASA Prophylaxis  Lung CA Screening    Jody Blackburn MD  [facility] Appointments   Date Time Provider Department Center   8/27/2020  3:30 PM Jody Blackburn MD [facility]     Appointment Notes for this encounter:      appt conf w/pt 8/25 jlb  please call and confirm (AA) fasting physical    Health Maintenance Due   Topic Date Due     ANNUAL REVIEW OF HM ORDERS  1960     ADVANCE CARE PLANNING  1960     ZOSTER IMMUNIZATION (1 of 2) 09/25/2010     PREVENTIVE CARE VISIT  09/02/2017     LIPID  05/14/2019     PHQ-2  01/01/2020     INFLUENZA VACCINE (1) 09/01/2020     Health Maintenance addressed:  Immunizations    Immunizations Pt will update today    MyChart Status:  Active and Using

## 2020-08-28 LAB
ANION GAP SERPL CALCULATED.3IONS-SCNC: 5 MMOL/L (ref 3–14)
BUN SERPL-MCNC: 19 MG/DL (ref 7–30)
CALCIUM SERPL-MCNC: 10 MG/DL (ref 8.5–10.1)
CHLORIDE SERPL-SCNC: 102 MMOL/L (ref 94–109)
CHOLEST SERPL-MCNC: 127 MG/DL
CO2 SERPL-SCNC: 27 MMOL/L (ref 20–32)
CREAT SERPL-MCNC: 0.87 MG/DL (ref 0.66–1.25)
GFR SERPL CREATININE-BSD FRML MDRD: >90 ML/MIN/{1.73_M2}
GLUCOSE SERPL-MCNC: 92 MG/DL (ref 70–99)
HDLC SERPL-MCNC: 50 MG/DL
LDLC SERPL CALC-MCNC: 52 MG/DL
NONHDLC SERPL-MCNC: 77 MG/DL
POTASSIUM SERPL-SCNC: 4.4 MMOL/L (ref 3.4–5.3)
PSA SERPL-ACNC: 1.86 UG/L (ref 0–4)
SODIUM SERPL-SCNC: 134 MMOL/L (ref 133–144)
TRIGL SERPL-MCNC: 125 MG/DL

## 2020-12-14 ENCOUNTER — HEALTH MAINTENANCE LETTER (OUTPATIENT)
Age: 60
End: 2020-12-14

## 2021-02-23 DIAGNOSIS — I10 BENIGN ESSENTIAL HYPERTENSION: ICD-10-CM

## 2021-02-23 RX ORDER — METOPROLOL SUCCINATE 50 MG/1
50 TABLET, EXTENDED RELEASE ORAL 2 TIMES DAILY
Qty: 180 TABLET | Refills: 0 | Status: SHIPPED | OUTPATIENT
Start: 2021-02-23 | End: 2021-05-25

## 2021-02-23 RX ORDER — LISINOPRIL 10 MG/1
10 TABLET ORAL DAILY
Qty: 90 TABLET | Refills: 0 | Status: SHIPPED | OUTPATIENT
Start: 2021-02-23 | End: 2021-05-25

## 2021-04-05 ENCOUNTER — TRANSFERRED RECORDS (OUTPATIENT)
Dept: HEALTH INFORMATION MANAGEMENT | Facility: CLINIC | Age: 61
End: 2021-04-05

## 2021-04-12 ENCOUNTER — OFFICE VISIT (OUTPATIENT)
Dept: CARDIOLOGY | Facility: CLINIC | Age: 61
End: 2021-04-12
Payer: COMMERCIAL

## 2021-04-12 VITALS
HEIGHT: 72 IN | SYSTOLIC BLOOD PRESSURE: 124 MMHG | WEIGHT: 180 LBS | BODY MASS INDEX: 24.38 KG/M2 | HEART RATE: 77 BPM | DIASTOLIC BLOOD PRESSURE: 78 MMHG

## 2021-04-12 DIAGNOSIS — E78.2 MIXED HYPERLIPIDEMIA: ICD-10-CM

## 2021-04-12 DIAGNOSIS — Z95.5 STATUS POST INSERTION OF DRUG ELUTING CORONARY ARTERY STENT: ICD-10-CM

## 2021-04-12 DIAGNOSIS — R74.01 TRANSAMINITIS: ICD-10-CM

## 2021-04-12 DIAGNOSIS — I10 BENIGN ESSENTIAL HYPERTENSION: ICD-10-CM

## 2021-04-12 DIAGNOSIS — I25.10 CORONARY ARTERY DISEASE INVOLVING NATIVE CORONARY ARTERY OF NATIVE HEART WITHOUT ANGINA PECTORIS: Primary | ICD-10-CM

## 2021-04-12 PROCEDURE — 99214 OFFICE O/P EST MOD 30 MIN: CPT | Performed by: INTERNAL MEDICINE

## 2021-04-12 RX ORDER — VIT C/B6/B5/MAGNESIUM/HERB 173 50-5-6-5MG
CAPSULE ORAL
COMMUNITY
End: 2024-01-23

## 2021-04-12 ASSESSMENT — MIFFLIN-ST. JEOR: SCORE: 1664.47

## 2021-04-12 NOTE — LETTER
4/12/2021    Jody Blackburn MD  [id number] [facility] [place] [address]    RE: Kevin Simeon       Dear Colleague,    I had the pleasure of seeing Kevin Simeon in the [facility].    CARDIOLOGY CLINIC FOLLOW-UP NOTE      REASON FOR VISIT:   Follow-up CAD    PRIMARY CARE PHYSICIAN:  Jody Blackburn        History of Present Illness   Kevin Simeon is an extremely pleasant 60 year old male, previously a patient of Dr. Tejada who was last seen on 12/9/2019, here for routine follow-up of CAD.  He initially presented with exertional angina in October 2017 and had an abnormal stress echocardiogram.  He was then referred for coronary angiography, which she underwent on 10/10/2017.  I personally reviewed the films, and this showed a subtotal occlusion of a large first diagonal branch, and mild CAD elsewhere except for a 90% stenosis in a very small and circuitous, nondominant RCA.  Of note, the LAD and circumflex have separate ostia.  He underwent successful stenting of the D1 vessel, and has done very well ever since.    Since his last visit, Kevin reports that he continues to do well without any new issues.  He remains active walking his dog, though his dog is getting older and so he is not walking quite as far as before.  That said, he denies any chest pain, shortness of breath, lower extremity swelling, palpitations, lightheadedness, bleeding, or any other cardiac issues.  His only recent noncardiac issue is that he had his colonoscopy recently and several polyps were found.  Thankfully, these were reportedly benign, but he does have a repeat colonoscopy in a year.    On review of his labs, his most recent labs are from 8/27/2020.  These show normal electrolytes, creatinine of 0.87 with estimated GFR greater than 90, total cholesterol 127, HDL 50, LDL 52, and triglycerides 125.  Interestingly, his most recent set of transaminases was from June 2018.  At that time he had an ALT of  49, which was up from 41 in April 2018.  These have not been rechecked since.  His last echocardiogram was an exercise stress echo from 11/26/2018, where he exercised for 9 minutes and 22 seconds on a Dez protocol.  There were no inducible ECG or echocardiographic wall motion abnormalities.  Resting LVEF was normal.        Assessment & Plan     1. CAD s/p SHANNON-D1 in 10/2017, CCS 0 angina  2. Hypertension  3. Hyperlipidemia  4. Mild transaminitis      Overall, Kevin continues to do extremely well.  I will not plan to make any changes to his medication regimen at this point.  The only thing that I do want to check is that his transaminases were slightly elevated and trending up back in 2018, and do not appear to have been checked since.  While overall the risk of statin induced hepatic injury is quite low, since his transaminases were elevated in the past we should recheck this and make sure that they are not worsening.  If it does appear that his numbers are in fact worsening, we will consider changing him from simvastatin to a different type of statin, as well as initiate a further hepatic work-up.      -Check lipid panel and CMP  -Continue aspirin 81 mg daily  -Continue simvastatin 40 mg daily for now  -Continue lisinopril and metoprolol      Follow-up: 1 year, or sooner as needed        Paulo Davis MD  Interventional Cardiology  April 12, 2021        Medications   Current Outpatient Medications   Medication     allopurinol (ZYLOPRIM) 300 MG tablet     aspirin 81 MG EC tablet     Ginger, Zingiber officinalis, (GINGER PO)     lisinopril (ZESTRIL) 10 MG tablet     metoprolol succinate ER (TOPROL-XL) 50 MG 24 hr tablet     simvastatin (ZOCOR) 40 MG tablet     Turmeric 500 MG CAPS     No current facility-administered medications for this visit.      Allergies   Allergies   Allergen Reactions     Penicillins          Physical Exam       BP: 124/78 Pulse: 77            Vital Signs with Ranges  Pulse:  [77] 77  BP:  (124)/(78) 124/78  180 lbs 0 oz    Constitutional: Well-appearing, no acute distress  Respiratory: Normal respiratory effort, CTAB  Cardiovascular: RRR, no m/r/g.  JVP < 7 cm H2O.  There is no LE edema.  Normal carotid upstrokes, no carotid bruits.        Thank you for allowing me to participate in the care of your patient.      Sincerely,     Paulo Davis MD     [facility]    cc:   No referring provider defined for this encounter.

## 2021-04-12 NOTE — PROGRESS NOTES
CARDIOLOGY CLINIC FOLLOW-UP NOTE      REASON FOR VISIT:   Follow-up CAD    PRIMARY CARE PHYSICIAN:  Jody Blackburn        History of Present Illness   Kevin Simeon is an extremely pleasant 60 year old male, previously a patient of Dr. Tejada who was last seen on 12/9/2019, here for routine follow-up of CAD.  He initially presented with exertional angina in October 2017 and had an abnormal stress echocardiogram.  He was then referred for coronary angiography, which she underwent on 10/10/2017.  I personally reviewed the films, and this showed a subtotal occlusion of a large first diagonal branch, and mild CAD elsewhere except for a 90% stenosis in a very small and circuitous, nondominant RCA.  Of note, the LAD and circumflex have separate ostia.  He underwent successful stenting of the D1 vessel, and has done very well ever since.    Since his last visit, Kevin reports that he continues to do well without any new issues.  He remains active walking his dog, though his dog is getting older and so he is not walking quite as far as before.  That said, he denies any chest pain, shortness of breath, lower extremity swelling, palpitations, lightheadedness, bleeding, or any other cardiac issues.  His only recent noncardiac issue is that he had his colonoscopy recently and several polyps were found.  Thankfully, these were reportedly benign, but he does have a repeat colonoscopy in a year.    On review of his labs, his most recent labs are from 8/27/2020.  These show normal electrolytes, creatinine of 0.87 with estimated GFR greater than 90, total cholesterol 127, HDL 50, LDL 52, and triglycerides 125.  Interestingly, his most recent set of transaminases was from June 2018.  At that time he had an ALT of 49, which was up from 41 in April 2018.  These have not been rechecked since.  His last echocardiogram was an exercise stress echo from 11/26/2018, where he exercised for 9 minutes and 22 seconds on a Dez protocol.  There were no  inducible ECG or echocardiographic wall motion abnormalities.  Resting LVEF was normal.        Assessment & Plan     1. CAD s/p SHANNON-D1 in 10/2017, CCS 0 angina  2. Hypertension  3. Hyperlipidemia  4. Mild transaminitis      Overall, Kevin continues to do extremely well.  I will not plan to make any changes to his medication regimen at this point.  The only thing that I do want to check is that his transaminases were slightly elevated and trending up back in 2018, and do not appear to have been checked since.  While overall the risk of statin induced hepatic injury is quite low, since his transaminases were elevated in the past we should recheck this and make sure that they are not worsening.  If it does appear that his numbers are in fact worsening, we will consider changing him from simvastatin to a different type of statin, as well as initiate a further hepatic work-up.      -Check lipid panel and CMP  -Continue aspirin 81 mg daily  -Continue simvastatin 40 mg daily for now  -Continue lisinopril and metoprolol      Follow-up: 1 year, or sooner as needed        Paulo Davis MD  Interventional Cardiology  April 12, 2021        Medications   Current Outpatient Medications   Medication     allopurinol (ZYLOPRIM) 300 MG tablet     aspirin 81 MG EC tablet     Ginger, Zingiber officinalis, (GINGER PO)     lisinopril (ZESTRIL) 10 MG tablet     metoprolol succinate ER (TOPROL-XL) 50 MG 24 hr tablet     simvastatin (ZOCOR) 40 MG tablet     Turmeric 500 MG CAPS     No current facility-administered medications for this visit.      Allergies   Allergies   Allergen Reactions     Penicillins          Physical Exam       BP: 124/78 Pulse: 77            Vital Signs with Ranges  Pulse:  [77] 77  BP: (124)/(78) 124/78  180 lbs 0 oz    Constitutional: Well-appearing, no acute distress  Respiratory: Normal respiratory effort, CTAB  Cardiovascular: RRR, no m/r/g.  JVP < 7 cm H2O.  There is no LE edema.  Normal carotid upstrokes,  no carotid bruits.

## 2021-04-18 ENCOUNTER — HEALTH MAINTENANCE LETTER (OUTPATIENT)
Age: 61
End: 2021-04-18

## 2021-05-25 DIAGNOSIS — I10 BENIGN ESSENTIAL HYPERTENSION: ICD-10-CM

## 2021-05-25 RX ORDER — METOPROLOL SUCCINATE 50 MG/1
50 TABLET, EXTENDED RELEASE ORAL 2 TIMES DAILY
Qty: 180 TABLET | Refills: 3 | Status: SHIPPED | OUTPATIENT
Start: 2021-05-25 | End: 2021-11-02

## 2021-05-25 RX ORDER — LISINOPRIL 10 MG/1
10 TABLET ORAL DAILY
Qty: 90 TABLET | Refills: 3 | Status: SHIPPED | OUTPATIENT
Start: 2021-05-25 | End: 2021-11-02

## 2021-06-10 DIAGNOSIS — I25.10 CORONARY ARTERY DISEASE INVOLVING NATIVE CORONARY ARTERY OF NATIVE HEART WITHOUT ANGINA PECTORIS: ICD-10-CM

## 2021-06-10 LAB
ALBUMIN SERPL-MCNC: 3.9 G/DL (ref 3.4–5)
ALP SERPL-CCNC: 66 U/L (ref 40–150)
ALT SERPL W P-5'-P-CCNC: 52 U/L (ref 0–70)
ANION GAP SERPL CALCULATED.3IONS-SCNC: 6 MMOL/L (ref 3–14)
AST SERPL W P-5'-P-CCNC: 38 U/L (ref 0–45)
BILIRUB SERPL-MCNC: 0.4 MG/DL (ref 0.2–1.3)
BUN SERPL-MCNC: 19 MG/DL (ref 7–30)
CALCIUM SERPL-MCNC: 8.9 MG/DL (ref 8.5–10.1)
CHLORIDE SERPL-SCNC: 103 MMOL/L (ref 94–109)
CHOLEST SERPL-MCNC: 110 MG/DL
CO2 SERPL-SCNC: 28 MMOL/L (ref 20–32)
CREAT SERPL-MCNC: 0.88 MG/DL (ref 0.66–1.25)
GFR SERPL CREATININE-BSD FRML MDRD: >90 ML/MIN/{1.73_M2}
GLUCOSE SERPL-MCNC: 96 MG/DL (ref 70–99)
HDLC SERPL-MCNC: 46 MG/DL
LDLC SERPL CALC-MCNC: 49 MG/DL
NONHDLC SERPL-MCNC: 64 MG/DL
POTASSIUM SERPL-SCNC: 4.5 MMOL/L (ref 3.4–5.3)
PROT SERPL-MCNC: 7.6 G/DL (ref 6.8–8.8)
SODIUM SERPL-SCNC: 137 MMOL/L (ref 133–144)
TRIGL SERPL-MCNC: 74 MG/DL

## 2021-06-10 PROCEDURE — 80061 LIPID PANEL: CPT | Performed by: INTERNAL MEDICINE

## 2021-06-10 PROCEDURE — 36415 COLL VENOUS BLD VENIPUNCTURE: CPT | Performed by: INTERNAL MEDICINE

## 2021-06-10 PROCEDURE — 80053 COMPREHEN METABOLIC PANEL: CPT | Performed by: INTERNAL MEDICINE

## 2021-06-23 ENCOUNTER — TRANSFERRED RECORDS (OUTPATIENT)
Dept: HEALTH INFORMATION MANAGEMENT | Facility: CLINIC | Age: 61
End: 2021-06-23

## 2021-10-02 ENCOUNTER — HEALTH MAINTENANCE LETTER (OUTPATIENT)
Age: 61
End: 2021-10-02

## 2021-10-11 DIAGNOSIS — E78.5 HYPERLIPIDEMIA LDL GOAL <160: ICD-10-CM

## 2021-10-12 RX ORDER — SIMVASTATIN 40 MG
40 TABLET ORAL AT BEDTIME
Qty: 30 TABLET | Refills: 0 | Status: SHIPPED | OUTPATIENT
Start: 2021-10-12 | End: 2021-11-02

## 2021-10-12 NOTE — TELEPHONE ENCOUNTER
Please call, pt needs to be seen.  Jody Blackburn MD  [facility]  [phone]

## 2021-10-12 NOTE — TELEPHONE ENCOUNTER
Routing refill request to provider for review/approval because:  Patient needs to be seen because it has been more than 1 year since last office visit.    Cheyanne Dolan RN

## 2021-11-02 ENCOUNTER — OFFICE VISIT (OUTPATIENT)
Dept: FAMILY MEDICINE | Facility: CLINIC | Age: 61
End: 2021-11-02
Payer: COMMERCIAL

## 2021-11-02 VITALS
BODY MASS INDEX: 24.11 KG/M2 | DIASTOLIC BLOOD PRESSURE: 80 MMHG | OXYGEN SATURATION: 100 % | HEIGHT: 72 IN | WEIGHT: 178 LBS | HEART RATE: 85 BPM | SYSTOLIC BLOOD PRESSURE: 135 MMHG | TEMPERATURE: 98 F | RESPIRATION RATE: 18 BRPM

## 2021-11-02 DIAGNOSIS — Z95.5 STATUS POST INSERTION OF DRUG ELUTING CORONARY ARTERY STENT: ICD-10-CM

## 2021-11-02 DIAGNOSIS — I25.10 CORONARY ARTERY DISEASE INVOLVING NATIVE CORONARY ARTERY OF NATIVE HEART WITHOUT ANGINA PECTORIS: ICD-10-CM

## 2021-11-02 DIAGNOSIS — I10 BENIGN ESSENTIAL HYPERTENSION: ICD-10-CM

## 2021-11-02 DIAGNOSIS — M1A.9XX0 CHRONIC GOUT WITHOUT TOPHUS, UNSPECIFIED CAUSE, UNSPECIFIED SITE: Primary | ICD-10-CM

## 2021-11-02 DIAGNOSIS — E78.5 HYPERLIPIDEMIA LDL GOAL <160: ICD-10-CM

## 2021-11-02 PROBLEM — R74.01 TRANSAMINITIS: Status: RESOLVED | Noted: 2021-04-12 | Resolved: 2021-11-02

## 2021-11-02 PROBLEM — E78.2 MIXED HYPERLIPIDEMIA: Status: RESOLVED | Noted: 2017-09-29 | Resolved: 2021-11-02

## 2021-11-02 PROCEDURE — 90472 IMMUNIZATION ADMIN EACH ADD: CPT | Performed by: FAMILY MEDICINE

## 2021-11-02 PROCEDURE — 90471 IMMUNIZATION ADMIN: CPT | Performed by: FAMILY MEDICINE

## 2021-11-02 PROCEDURE — 99214 OFFICE O/P EST MOD 30 MIN: CPT | Mod: 25 | Performed by: FAMILY MEDICINE

## 2021-11-02 PROCEDURE — 90750 HZV VACC RECOMBINANT IM: CPT | Performed by: FAMILY MEDICINE

## 2021-11-02 PROCEDURE — 90682 RIV4 VACC RECOMBINANT DNA IM: CPT | Performed by: FAMILY MEDICINE

## 2021-11-02 RX ORDER — ALLOPURINOL 300 MG/1
300 TABLET ORAL DAILY
Qty: 90 TABLET | Refills: 3 | Status: SHIPPED | OUTPATIENT
Start: 2021-11-02 | End: 2022-11-08

## 2021-11-02 RX ORDER — LISINOPRIL 10 MG/1
10 TABLET ORAL DAILY
Qty: 90 TABLET | Refills: 3 | Status: SHIPPED | OUTPATIENT
Start: 2021-11-02 | End: 2022-10-27

## 2021-11-02 RX ORDER — METOPROLOL SUCCINATE 50 MG/1
50 TABLET, EXTENDED RELEASE ORAL 2 TIMES DAILY
Qty: 180 TABLET | Refills: 3 | Status: SHIPPED | OUTPATIENT
Start: 2021-11-02 | End: 2022-10-27

## 2021-11-02 RX ORDER — SIMVASTATIN 40 MG
40 TABLET ORAL AT BEDTIME
Qty: 90 TABLET | Refills: 3 | Status: SHIPPED | OUTPATIENT
Start: 2021-11-02 | End: 2022-10-27

## 2021-11-02 ASSESSMENT — MIFFLIN-ST. JEOR: SCORE: 1650.4

## 2021-11-02 NOTE — PROGRESS NOTES
Assessment & Plan     Coronary artery disease involving native coronary artery of native heart without angina pectoris  Doing excellent, continue on ASA, statin, lisinopril and B blockers.    Status post insertion of drug eluting coronary artery stent      Hyperlipidemia LDL goal <160  Stable, continue on   - simvastatin (ZOCOR) 40 MG tablet; Take 1 tablet (40 mg) by mouth At Bedtime Office visit due    Benign essential hypertension  Controlled, continue on   - metoprolol succinate ER (TOPROL-XL) 50 MG 24 hr tablet; Take 1 tablet (50 mg) by mouth 2 times daily  - lisinopril (ZESTRIL) 10 MG tablet; Take 1 tablet (10 mg) by mouth daily    Chronic gout without tophus, unspecified cause, unspecified site  0 episodes, continue on   - allopurinol (ZYLOPRIM) 300 MG tablet; Take 1 tablet (300 mg) by mouth daily                 Return in about 1 year (around 11/2/2022) for Routine physical..    Jody Blackburn MD  [facility]    Mary Brown is a 61 year old who presents for the following health issues     History of Present Illness       Hyperlipidemia:  He presents for follow up of hyperlipidemia.  He is taking medication to lower cholesterol. He is not having myalgia or other side effects to statin medications.    He eats 2-3 servings of fruits and vegetables daily.He consumes 1 sweetened beverage(s) daily.He exercises with enough effort to increase his heart rate 20 to 29 minutes per day.  He exercises with enough effort to increase his heart rate 7 days per week.   He is taking medications regularly.       Vascular Disease Follow-up      How often do you take nitroglycerin? Never    Do you take an aspirin every day? Yes      Hypertension Follow-up      Do you check your blood pressure regularly outside of the clinic? No     Are you following a low salt diet? Yes    Are your blood pressures ever more than 140 on the top number (systolic) OR more   than 90 on the bottom number (diastolic), for  example 140/90?         Review of Systems   CONSTITUTIONAL: NEGATIVE for fever, chills, change in weight      Objective    There were no vitals taken for this visit.  There is no height or weight on file to calculate BMI.  Physical Exam   GENERAL: healthy, alert and no distress  NECK: no adenopathy, no asymmetry, masses, or scars and thyroid normal to palpation  RESP: lungs clear to auscultation - no rales, rhonchi or wheezes  CV: regular rate and rhythm, normal S1 S2, no S3 or S4, no murmur, click or rub, no peripheral edema and peripheral pulses strong  MS: no gross musculoskeletal defects noted, no edema

## 2021-11-16 ENCOUNTER — ALLIED HEALTH/NURSE VISIT (OUTPATIENT)
Dept: FAMILY MEDICINE | Facility: CLINIC | Age: 61
End: 2021-11-16
Payer: COMMERCIAL

## 2021-11-16 DIAGNOSIS — Z23 HIGH PRIORITY FOR 2019-NCOV VACCINE: Primary | ICD-10-CM

## 2021-11-16 PROCEDURE — 91306 COVID-19,PF,MODERNA (18+ YRS BOOSTER .25ML): CPT

## 2021-11-16 PROCEDURE — 99207 PR NO CHARGE LOS: CPT

## 2021-11-16 PROCEDURE — 0064A COVID-19,PF,MODERNA (18+ YRS BOOSTER .25ML): CPT

## 2022-05-14 ENCOUNTER — HEALTH MAINTENANCE LETTER (OUTPATIENT)
Age: 62
End: 2022-05-14

## 2022-06-29 ENCOUNTER — TRANSFERRED RECORDS (OUTPATIENT)
Dept: HEALTH INFORMATION MANAGEMENT | Facility: CLINIC | Age: 62
End: 2022-06-29

## 2022-09-03 ENCOUNTER — HEALTH MAINTENANCE LETTER (OUTPATIENT)
Age: 62
End: 2022-09-03

## 2022-09-06 ENCOUNTER — MYC MEDICAL ADVICE (OUTPATIENT)
Dept: FAMILY MEDICINE | Facility: CLINIC | Age: 62
End: 2022-09-06

## 2022-10-27 DIAGNOSIS — I10 BENIGN ESSENTIAL HYPERTENSION: ICD-10-CM

## 2022-10-27 DIAGNOSIS — E78.5 HYPERLIPIDEMIA LDL GOAL <160: ICD-10-CM

## 2022-10-27 RX ORDER — METOPROLOL SUCCINATE 50 MG/1
TABLET, EXTENDED RELEASE ORAL
Qty: 180 TABLET | Refills: 0 | Status: SHIPPED | OUTPATIENT
Start: 2022-10-27 | End: 2022-11-08

## 2022-10-27 RX ORDER — LISINOPRIL 10 MG/1
TABLET ORAL
Qty: 90 TABLET | Refills: 0 | Status: SHIPPED | OUTPATIENT
Start: 2022-10-27 | End: 2022-11-08

## 2022-10-27 RX ORDER — SIMVASTATIN 40 MG
40 TABLET ORAL AT BEDTIME
Qty: 90 TABLET | Refills: 0 | Status: SHIPPED | OUTPATIENT
Start: 2022-10-27 | End: 2022-11-08

## 2022-10-27 NOTE — TELEPHONE ENCOUNTER
Medication is being filled for 1 time refill only due to:  Patient needs to be seen because it has been more than one year since last visit.  Prescription approved per [facility] Refill Protocol.  Routed to  for scheduling  Whitney Chaudhry RN, BSN  [facility]

## 2022-11-08 ENCOUNTER — OFFICE VISIT (OUTPATIENT)
Dept: FAMILY MEDICINE | Facility: CLINIC | Age: 62
End: 2022-11-08
Payer: COMMERCIAL

## 2022-11-08 ENCOUNTER — ANCILLARY PROCEDURE (OUTPATIENT)
Dept: GENERAL RADIOLOGY | Facility: CLINIC | Age: 62
End: 2022-11-08
Attending: FAMILY MEDICINE
Payer: COMMERCIAL

## 2022-11-08 VITALS
HEIGHT: 72 IN | OXYGEN SATURATION: 99 % | SYSTOLIC BLOOD PRESSURE: 129 MMHG | BODY MASS INDEX: 24.49 KG/M2 | DIASTOLIC BLOOD PRESSURE: 80 MMHG | WEIGHT: 180.8 LBS | HEART RATE: 81 BPM

## 2022-11-08 DIAGNOSIS — I10 BENIGN ESSENTIAL HYPERTENSION: ICD-10-CM

## 2022-11-08 DIAGNOSIS — M1A.9XX0 CHRONIC GOUT WITHOUT TOPHUS, UNSPECIFIED CAUSE, UNSPECIFIED SITE: ICD-10-CM

## 2022-11-08 DIAGNOSIS — G89.29 CHRONIC BILATERAL LOW BACK PAIN WITHOUT SCIATICA: ICD-10-CM

## 2022-11-08 DIAGNOSIS — I25.10 CORONARY ARTERY DISEASE INVOLVING NATIVE CORONARY ARTERY OF NATIVE HEART WITHOUT ANGINA PECTORIS: ICD-10-CM

## 2022-11-08 DIAGNOSIS — Z00.00 ROUTINE HISTORY AND PHYSICAL EXAMINATION OF ADULT: Primary | ICD-10-CM

## 2022-11-08 DIAGNOSIS — M54.50 CHRONIC BILATERAL LOW BACK PAIN WITHOUT SCIATICA: ICD-10-CM

## 2022-11-08 DIAGNOSIS — E78.5 HYPERLIPIDEMIA LDL GOAL <160: ICD-10-CM

## 2022-11-08 PROBLEM — D12.4 BENIGN NEOPLASM OF DESCENDING COLON: Status: RESOLVED | Noted: 2022-07-01 | Resolved: 2022-11-08

## 2022-11-08 PROBLEM — D12.4 BENIGN NEOPLASM OF DESCENDING COLON: Status: ACTIVE | Noted: 2022-07-01

## 2022-11-08 PROBLEM — D12.2 BENIGN NEOPLASM OF ASCENDING COLON: Status: RESOLVED | Noted: 2021-04-07 | Resolved: 2022-11-08

## 2022-11-08 PROBLEM — D12.3 BENIGN NEOPLASM OF TRANSVERSE COLON: Status: ACTIVE | Noted: 2021-04-07

## 2022-11-08 PROBLEM — D12.2 BENIGN NEOPLASM OF ASCENDING COLON: Status: ACTIVE | Noted: 2021-04-07

## 2022-11-08 PROCEDURE — 72100 X-RAY EXAM L-S SPINE 2/3 VWS: CPT | Mod: TC | Performed by: RADIOLOGY

## 2022-11-08 PROCEDURE — 80061 LIPID PANEL: CPT | Performed by: FAMILY MEDICINE

## 2022-11-08 PROCEDURE — 99396 PREV VISIT EST AGE 40-64: CPT | Mod: 25 | Performed by: FAMILY MEDICINE

## 2022-11-08 PROCEDURE — 73502 X-RAY EXAM HIP UNI 2-3 VIEWS: CPT | Mod: TC | Performed by: RADIOLOGY

## 2022-11-08 PROCEDURE — 80048 BASIC METABOLIC PNL TOTAL CA: CPT | Performed by: FAMILY MEDICINE

## 2022-11-08 PROCEDURE — 99214 OFFICE O/P EST MOD 30 MIN: CPT | Mod: 25 | Performed by: FAMILY MEDICINE

## 2022-11-08 PROCEDURE — 84550 ASSAY OF BLOOD/URIC ACID: CPT | Performed by: FAMILY MEDICINE

## 2022-11-08 PROCEDURE — 90682 RIV4 VACC RECOMBINANT DNA IM: CPT | Performed by: FAMILY MEDICINE

## 2022-11-08 PROCEDURE — 36415 COLL VENOUS BLD VENIPUNCTURE: CPT | Performed by: FAMILY MEDICINE

## 2022-11-08 PROCEDURE — 90471 IMMUNIZATION ADMIN: CPT | Performed by: FAMILY MEDICINE

## 2022-11-08 RX ORDER — LISINOPRIL 10 MG/1
10 TABLET ORAL DAILY
Qty: 90 TABLET | Refills: 3 | Status: SHIPPED | OUTPATIENT
Start: 2022-11-08 | End: 2023-11-10

## 2022-11-08 RX ORDER — METOPROLOL SUCCINATE 50 MG/1
50 TABLET, EXTENDED RELEASE ORAL 2 TIMES DAILY
Qty: 180 TABLET | Refills: 3 | Status: SHIPPED | OUTPATIENT
Start: 2022-11-08 | End: 2023-11-10

## 2022-11-08 RX ORDER — ALLOPURINOL 300 MG/1
300 TABLET ORAL DAILY
Qty: 90 TABLET | Refills: 3 | Status: SHIPPED | OUTPATIENT
Start: 2022-11-08 | End: 2023-11-10

## 2022-11-08 RX ORDER — SIMVASTATIN 40 MG
40 TABLET ORAL AT BEDTIME
Qty: 90 TABLET | Refills: 3 | Status: SHIPPED | OUTPATIENT
Start: 2022-11-08 | End: 2023-11-10

## 2022-11-08 SDOH — HEALTH STABILITY: PHYSICAL HEALTH: ON AVERAGE, HOW MANY DAYS PER WEEK DO YOU ENGAGE IN MODERATE TO STRENUOUS EXERCISE (LIKE A BRISK WALK)?: 2 DAYS

## 2022-11-08 SDOH — ECONOMIC STABILITY: FOOD INSECURITY: WITHIN THE PAST 12 MONTHS, THE FOOD YOU BOUGHT JUST DIDN'T LAST AND YOU DIDN'T HAVE MONEY TO GET MORE.: NEVER TRUE

## 2022-11-08 SDOH — HEALTH STABILITY: PHYSICAL HEALTH: ON AVERAGE, HOW MANY MINUTES DO YOU ENGAGE IN EXERCISE AT THIS LEVEL?: 40 MIN

## 2022-11-08 SDOH — ECONOMIC STABILITY: TRANSPORTATION INSECURITY
IN THE PAST 12 MONTHS, HAS LACK OF TRANSPORTATION KEPT YOU FROM MEETINGS, WORK, OR FROM GETTING THINGS NEEDED FOR DAILY LIVING?: NO

## 2022-11-08 SDOH — ECONOMIC STABILITY: INCOME INSECURITY: IN THE LAST 12 MONTHS, WAS THERE A TIME WHEN YOU WERE NOT ABLE TO PAY THE MORTGAGE OR RENT ON TIME?: NO

## 2022-11-08 SDOH — ECONOMIC STABILITY: INCOME INSECURITY: HOW HARD IS IT FOR YOU TO PAY FOR THE VERY BASICS LIKE FOOD, HOUSING, MEDICAL CARE, AND HEATING?: NOT HARD AT ALL

## 2022-11-08 SDOH — ECONOMIC STABILITY: FOOD INSECURITY: WITHIN THE PAST 12 MONTHS, YOU WORRIED THAT YOUR FOOD WOULD RUN OUT BEFORE YOU GOT MONEY TO BUY MORE.: NEVER TRUE

## 2022-11-08 SDOH — ECONOMIC STABILITY: TRANSPORTATION INSECURITY
IN THE PAST 12 MONTHS, HAS THE LACK OF TRANSPORTATION KEPT YOU FROM MEDICAL APPOINTMENTS OR FROM GETTING MEDICATIONS?: NO

## 2022-11-08 ASSESSMENT — ENCOUNTER SYMPTOMS
NERVOUS/ANXIOUS: 0
NAUSEA: 0
HEADACHES: 0
COUGH: 0
EYE PAIN: 0
FEVER: 0
MYALGIAS: 0
ABDOMINAL PAIN: 0
DYSURIA: 0
WEAKNESS: 0
SHORTNESS OF BREATH: 0
SORE THROAT: 0
HEMATURIA: 0
FREQUENCY: 0
HEMATOCHEZIA: 0
CONSTIPATION: 0
DIARRHEA: 0
CHILLS: 0
ARTHRALGIAS: 1
JOINT SWELLING: 0
PARESTHESIAS: 0
PALPITATIONS: 0
HEARTBURN: 0
DIZZINESS: 0

## 2022-11-08 ASSESSMENT — LIFESTYLE VARIABLES
HOW OFTEN DO YOU HAVE A DRINK CONTAINING ALCOHOL: 2-3 TIMES A WEEK
HOW OFTEN DO YOU HAVE SIX OR MORE DRINKS ON ONE OCCASION: NEVER
AUDIT-C TOTAL SCORE: 3
SKIP TO QUESTIONS 9-10: 1
HOW MANY STANDARD DRINKS CONTAINING ALCOHOL DO YOU HAVE ON A TYPICAL DAY: 1 OR 2

## 2022-11-08 ASSESSMENT — SOCIAL DETERMINANTS OF HEALTH (SDOH)
HOW OFTEN DO YOU GET TOGETHER WITH FRIENDS OR RELATIVES?: TWICE A WEEK
HOW OFTEN DO YOU ATTEND CHURCH OR RELIGIOUS SERVICES?: MORE THAN 4 TIMES PER YEAR
IN A TYPICAL WEEK, HOW MANY TIMES DO YOU TALK ON THE PHONE WITH FAMILY, FRIENDS, OR NEIGHBORS?: MORE THAN THREE TIMES A WEEK
DO YOU BELONG TO ANY CLUBS OR ORGANIZATIONS SUCH AS CHURCH GROUPS UNIONS, FRATERNAL OR ATHLETIC GROUPS, OR SCHOOL GROUPS?: YES

## 2022-11-08 NOTE — PROGRESS NOTES
SUBJECTIVE:   CC: Kevin is an 62 year old who presents for preventative health visit.         Healthy Habits:     Getting at least 3 servings of Calcium per day:  Yes    Bi-annual eye exam:  Yes    Dental care twice a year:  Yes    Sleep apnea or symptoms of sleep apnea:  None    Diet:  Regular (no restrictions)    Frequency of exercise:  2-3 days/week    Duration of exercise:  15-30 minutes    Taking medications regularly:  Yes    Medication side effects:  None    PHQ-2 Total Score: 0    Additional concerns today:  Yes          Joint or Musculoskeletal Pain  Duration of complaint: 5 months   Description:   Location: left hip  Character: Sharp and Dull ache  Intensity: moderate, severe  Progression of Symptoms: tolerable  Accompanying Signs & Symptoms: Other symptoms: numbness in fingers  History: Previous similar pain: no    Precipitating factors: Trauma or overuse: YES- pt states might have fallen while walking dog  Alleviating factors: Improved by: ice and prednisone  Therapies Tried and outcome: prednisone, ice, tylenol   Pt has hx of chronic low fide pain due to his DISH syndrome, pt noticed that pain is in left lower back more this summer that got worse when he playing golf, that he stopped golfing, and he also feels pain when he is going down the street or when the dogs pulling him to walk.  The pain is vague, and it is hard to pinpoint.  Worse when twisting.    Hyperlipidemia Follow-Up      Are you regularly taking any medication or supplement to lower your cholesterol?   Yes- Simvastatin    Are you having muscle aches or other side effects that you think could be caused by your cholesterol lowering medication?  No    Hypertension Follow-up      Do you check your blood pressure regularly outside of the clinic? No     Are you following a low salt diet? No    Are your blood pressures ever more than 140 on the top number (systolic) OR more   than 90 on the bottom number (diastolic), for example 140/90?  No    Vascular Disease Follow-up      How often do you take nitroglycerin? Never    Do you take an aspirin every day? Yes          Today's PHQ-2 Score:   PHQ-2 ( 1999 [employer]) 11/8/2022   Q1: Little interest or pleasure in doing things 0   Q2: Feeling down, depressed or hopeless 0   PHQ-2 Score 0   PHQ-2 Total Score (12-17 Years)- Positive if 3 or more points; Administer PHQ-A if positive -   Q1: Little interest or pleasure in doing things Not at all   Q2: Feeling down, depressed or hopeless Not at all   PHQ-2 Score 0       Abuse: Current or Past(Physical, Sexual or Emotional)- No  Do you feel safe in your environment? Yes    Have you ever done Advance Care Planning? (For example, a Health Directive, POLST, or a discussion with a medical provider or your loved ones about your wishes): No, advance care planning information given to patient to review.  Patient declined advance care planning discussion at this time.    Social History     Tobacco Use     Smoking status: Never     Smokeless tobacco: Never   Substance Use Topics     Alcohol use: Yes     Alcohol/week: 0.0 standard drinks     Comment: 15 beers per week     If you drink alcohol do you typically have >3 drinks per day or >7 drinks per week? No    Alcohol Use 11/8/2022   Prescreen: >3 drinks/day or >7 drinks/week? No   Prescreen: >3 drinks/day or >7 drinks/week? -       Last PSA:   PSA   Date Value Ref Range Status   08/27/2020 1.86 0 - 4 ug/L Final     Comment:     Assay Method:  Chemiluminescence using Siemens Vista analyzer       Reviewed orders with patient. Reviewed health maintenance and updated orders accordingly - Yes  Labs reviewed in EPIC  BP Readings from Last 3 Encounters:   11/08/22 129/80   11/02/21 135/80   04/12/21 124/78    Wt Readings from Last 3 Encounters:   11/08/22 82 kg (180 lb 12.8 oz)   11/02/21 80.7 kg (178 lb)   04/12/21 81.6 kg (180 lb)                  Patient Active Problem List   Diagnosis     Hyperlipidemia LDL goal <100      Inflammatory arthritis     Campylobacter diarrhea     DISH (diffuse idiopathic skeletal hyperostosis)     Seborrheic keratosis     Benign essential hypertension     Coronary artery disease involving native coronary artery of native heart without angina pectoris     Status post insertion of drug eluting coronary artery stent     Benign neoplasm of ascending colon     Benign neoplasm of transverse colon     Past Surgical History:   Procedure Laterality Date     COLONOSCOPY       HC LEFT HEART CATHETERIZATION  10/10/2017    percutaneous coronary intervention first diagonal branch of LAD: 32 x 2.25 mm length everolimus eluting Promus premiere stent       Social History     Tobacco Use     Smoking status: Never     Smokeless tobacco: Never   Substance Use Topics     Alcohol use: Yes     Alcohol/week: 0.0 standard drinks     Comment: 15 beers per week     Family History   Problem Relation Age of Onset     Heart Disease Mother      Hyperlipidemia Mother      Cerebrovascular Disease Mother      Aneurysm Father      Other - See Comments Father         bipass in kidney area     Hypertension Brother      Diabetes Brother          Current Outpatient Medications   Medication Sig Dispense Refill     allopurinol (ZYLOPRIM) 300 MG tablet Take 1 tablet (300 mg) by mouth daily 90 tablet 3     lisinopril (ZESTRIL) 10 MG tablet Take 1 tablet (10 mg) by mouth daily 90 tablet 3     metoprolol succinate ER (TOPROL XL) 50 MG 24 hr tablet Take 1 tablet (50 mg) by mouth 2 times daily 180 tablet 3     simvastatin (ZOCOR) 40 MG tablet Take 1 tablet (40 mg) by mouth At Bedtime 90 tablet 3     aspirin 81 MG EC tablet Take 1 tablet (81 mg) by mouth daily Start tomorrow morning. 90 tablet 3     Turmeric 500 MG CAPS        Allergies   Allergen Reactions     Penicillins        Reviewed and updated as needed this visit by clinical staff   Tobacco  Allergies  Meds   Med Hx  Surg Hx  Fam Hx  Soc Hx        Reviewed and updated as needed this visit  by Provider                 Past Medical History:   Diagnosis Date     Campylobacter diarrhea 03/19/2012     Coronary artery disease involving native coronary artery of native heart without angina pectoris 09/29/2017     DISH (diffuse idiopathic skeletal hyperostosis) 09/02/2016     Hyperlipidemia LDL goal <130 04/25/2017     Hypertension      Inflammatory arthritis 03/14/2012     Joint swelling 09/12/2013     Seborrheic keratosis 09/02/2016      Past Surgical History:   Procedure Laterality Date     COLONOSCOPY       HC LEFT HEART CATHETERIZATION  10/10/2017    percutaneous coronary intervention first diagonal branch of LAD: 32 x 2.25 mm length everolimus eluting Promus premiere stent       Review of Systems   Constitutional: Negative for chills and fever.   HENT: Negative for congestion, ear pain, hearing loss and sore throat.    Eyes: Negative for pain and visual disturbance.   Respiratory: Negative for cough and shortness of breath.    Cardiovascular: Negative for chest pain, palpitations and peripheral edema.   Gastrointestinal: Negative for abdominal pain, constipation, diarrhea, heartburn, hematochezia and nausea.   Genitourinary: Negative for dysuria, frequency, genital sores, hematuria, impotence, penile discharge and urgency.   Musculoskeletal: Positive for arthralgias. Negative for joint swelling and myalgias.   Skin: Negative for rash.   Neurological: Negative for dizziness, weakness, headaches and paresthesias.   Psychiatric/Behavioral: Negative for mood changes. The patient is not nervous/anxious.          OBJECTIVE:   /80 (BP Location: Right arm, Patient Position: Sitting, Cuff Size: Adult Regular)   Pulse 81   Ht 1.829 m (6')   Wt 82 kg (180 lb 12.8 oz)   SpO2 99%   BMI 24.52 kg/m      Physical Exam  GENERAL: healthy, alert and no distress  EYES: Eyes grossly normal to inspection, PERRL and conjunctivae and sclerae normal  HENT: ear canals and TM's normal, nose and mouth without ulcers or  lesions  NECK: no adenopathy, no asymmetry, masses, or scars and thyroid normal to palpation  RESP: lungs clear to auscultation - no rales, rhonchi or wheezes  CV: regular rate and rhythm, normal S1 S2, no S3 or S4, no murmur, click or rub, no peripheral edema and peripheral pulses strong  ABDOMEN: soft, nontender, no hepatosplenomegaly, no masses and bowel sounds normal  MS: Patient appears to be in moderate pain, no antalgic gait noted.   Lumbosacral spine area reveals left lower back local tenderness or mass.    ROM of the LS spine showed limited to .extension and  flexion   Straight leg raise is negative at 60 degrees on bilateral.  L4 :toe walk nl patellar reflux nl medial foot sensation nl  L5: dorsiflexion of the big toe nl,mid foot sensation nl  S1: heel walk nl, Ray reflex nl, lateral sensation of the foot nl     Peripheral pulses are palpable.   SKIN: no suspicious lesions or rashes  NEURO: Normal strength and tone, mentation intact and speech normal  PSYCH: mentation appears normal, affect normal/bright        ASSESSMENT/PLAN:   (Z00.00) Routine history and physical examination of adult  (primary encounter diagnosis)  Comment: doing well. Except for his chronic pain that is getting worse.  Plan:        (I25.10) Coronary artery disease involving native coronary artery of native heart without angina pectoris  Comment: stable, continue on simvastatin, metoprolol and lisinopril, and ASA.  Plan: Basic metabolic panel  (Ca, Cl, CO2, Creat,         Gluc, K, Na, BUN), Lipid panel reflex to direct        LDL Fasting            (E78.5) Hyperlipidemia LDL goal <160  Comment: continue on   Plan: simvastatin (ZOCOR) 40 MG tablet            (I10) Benign essential hypertension  Comment: controlled, continue on   Plan: metoprolol succinate ER (TOPROL XL) 50 MG 24 hr        tablet, lisinopril (ZESTRIL) 10 MG tablet            (M1A.9XX0) Chronic gout without tophus, unspecified cause, unspecified site  Comment: no  evidence of recurrence, continue on   Plan: allopurinol (ZYLOPRIM) 300 MG tablet, Uric acid            (M54.50,  G89.29) Chronic bilateral low back pain without sciatica  Comment: assocaited with left hip pain, mostly related to DISH syndrome, worsening, will get   Plan: XR Lumbar Spine 2/3 Views, XR Hip Left 2-3         Views        Also follow up with Rheumatology in 3 weeks (appointment made already). May refer to back specialist down the road if needed.             COUNSELING:   Reviewed preventive health counseling, as reflected in patient instructions       Regular exercise       Healthy diet/nutrition    Estimated body mass index is 24.52 kg/m  as calculated from the following:    Height as of this encounter: 1.829 m (6').    Weight as of this encounter: 82 kg (180 lb 12.8 oz).         He reports that he has never smoked. He has never used smokeless tobacco.      Counseling Resources:  ATP IV Guidelines  Pooled Cohorts Equation Calculator  FRAX Risk Assessment  ICSI Preventive Guidelines  Dietary Guidelines for Americans, 2010  USDA's MyPlate  ASA Prophylaxis  Lung CA Screening    Jody Blackburn MD  [facility]

## 2022-11-09 LAB
ANION GAP SERPL CALCULATED.3IONS-SCNC: 5 MMOL/L (ref 3–14)
BUN SERPL-MCNC: 18 MG/DL (ref 7–30)
CALCIUM SERPL-MCNC: 9.5 MG/DL (ref 8.5–10.1)
CHLORIDE BLD-SCNC: 103 MMOL/L (ref 94–109)
CHOLEST SERPL-MCNC: 120 MG/DL
CO2 SERPL-SCNC: 30 MMOL/L (ref 20–32)
CREAT SERPL-MCNC: 0.78 MG/DL (ref 0.66–1.25)
FASTING STATUS PATIENT QL REPORTED: NO
GFR SERPL CREATININE-BSD FRML MDRD: >90 ML/MIN/1.73M2
GLUCOSE BLD-MCNC: 71 MG/DL (ref 70–99)
HDLC SERPL-MCNC: 36 MG/DL
LDLC SERPL CALC-MCNC: 34 MG/DL
NONHDLC SERPL-MCNC: 84 MG/DL
POTASSIUM BLD-SCNC: 4.5 MMOL/L (ref 3.4–5.3)
SODIUM SERPL-SCNC: 138 MMOL/L (ref 133–144)
TRIGL SERPL-MCNC: 250 MG/DL
URATE SERPL-MCNC: 5.1 MG/DL (ref 3.5–7.2)

## 2022-11-29 ENCOUNTER — TRANSFERRED RECORDS (OUTPATIENT)
Dept: HEALTH INFORMATION MANAGEMENT | Facility: CLINIC | Age: 62
End: 2022-11-29

## 2023-02-18 ENCOUNTER — NURSE TRIAGE (OUTPATIENT)
Dept: NURSING | Facility: CLINIC | Age: 63
End: 2023-02-18
Payer: COMMERCIAL

## 2023-02-18 NOTE — TELEPHONE ENCOUNTER
Triage Call:    Caller: Patient     Patient is wanting to get meds refilled.  Reviewed chart and all 3 medications were renewed by PCP on 11/8/22 with 3 refills.  Patient states pharmacy is reporting they don't have them.  Advised patient to ask about any duplicate profiles in their system, as we have a confirmed prescription with all 4 of them.      Protocol Recommended Disposition: Home care    Caller verbalized understanding of instructions and questions answered.      Jordana Riojas RN on 2/18/2023 at 9:48 AM        Reason for Disposition    Caller has medicine question only, adult not sick, AND triager answers question    Protocols used: MEDICATION QUESTION CALL-A-

## 2023-11-10 DIAGNOSIS — M1A.9XX0 CHRONIC GOUT WITHOUT TOPHUS, UNSPECIFIED CAUSE, UNSPECIFIED SITE: ICD-10-CM

## 2023-11-10 DIAGNOSIS — E78.5 HYPERLIPIDEMIA LDL GOAL <160: ICD-10-CM

## 2023-11-10 DIAGNOSIS — I10 BENIGN ESSENTIAL HYPERTENSION: ICD-10-CM

## 2023-11-10 RX ORDER — ALLOPURINOL 300 MG/1
300 TABLET ORAL DAILY
Qty: 90 TABLET | Refills: 0 | Status: SHIPPED | OUTPATIENT
Start: 2023-11-10 | End: 2024-01-23

## 2023-11-10 RX ORDER — LISINOPRIL 10 MG/1
10 TABLET ORAL DAILY
Qty: 90 TABLET | Refills: 0 | Status: SHIPPED | OUTPATIENT
Start: 2023-11-10 | End: 2024-01-23

## 2023-11-10 RX ORDER — METOPROLOL SUCCINATE 50 MG/1
50 TABLET, EXTENDED RELEASE ORAL 2 TIMES DAILY
Qty: 180 TABLET | Refills: 0 | Status: SHIPPED | OUTPATIENT
Start: 2023-11-10 | End: 2024-01-23

## 2023-11-10 RX ORDER — SIMVASTATIN 40 MG
40 TABLET ORAL AT BEDTIME
Qty: 90 TABLET | Refills: 0 | Status: SHIPPED | OUTPATIENT
Start: 2023-11-10 | End: 2024-01-23

## 2023-11-10 NOTE — TELEPHONE ENCOUNTER
Please call, pt needs to be seen.  Jody Blackburn MD  [facility]  [phone]

## 2023-11-10 NOTE — LETTER
November 13, 2023      Kevin Simeon  [address] [address] [address]      Dear Kevin,    We recently received a call from your pharmacy requesting a refill of your medication.    A review of your chart indicates that an appointment is required with your provider.  Please call the clinic to schedule your appointment.    We have authorized one refill of your medication to allow time for you to schedule.   If you have a history of diabetes or high cholesterol, please come in fasting for the appointment. Fasting entails nothing to eat or drink 8 hours prior to your appointment; with the exception on water. You may take your medication the day of the appointment.    Thank you,      Jody Blackburn MD

## 2023-12-09 ENCOUNTER — HEALTH MAINTENANCE LETTER (OUTPATIENT)
Age: 63
End: 2023-12-09

## 2024-01-18 ENCOUNTER — MYC REFILL (OUTPATIENT)
Dept: FAMILY MEDICINE | Facility: CLINIC | Age: 64
End: 2024-01-18
Payer: COMMERCIAL

## 2024-01-18 DIAGNOSIS — M1A.9XX0 CHRONIC GOUT WITHOUT TOPHUS, UNSPECIFIED CAUSE, UNSPECIFIED SITE: ICD-10-CM

## 2024-01-19 RX ORDER — ALLOPURINOL 300 MG/1
300 TABLET ORAL DAILY
Qty: 90 TABLET | Refills: 0 | OUTPATIENT
Start: 2024-01-19

## 2024-01-23 ENCOUNTER — OFFICE VISIT (OUTPATIENT)
Dept: FAMILY MEDICINE | Facility: CLINIC | Age: 64
End: 2024-01-23
Payer: COMMERCIAL

## 2024-01-23 VITALS
TEMPERATURE: 98.1 F | OXYGEN SATURATION: 97 % | DIASTOLIC BLOOD PRESSURE: 81 MMHG | HEIGHT: 72 IN | SYSTOLIC BLOOD PRESSURE: 132 MMHG | BODY MASS INDEX: 25.22 KG/M2 | HEART RATE: 95 BPM | WEIGHT: 186.2 LBS | RESPIRATION RATE: 12 BRPM

## 2024-01-23 DIAGNOSIS — I10 BENIGN ESSENTIAL HYPERTENSION: ICD-10-CM

## 2024-01-23 DIAGNOSIS — I25.10 CORONARY ARTERY DISEASE INVOLVING NATIVE CORONARY ARTERY OF NATIVE HEART WITHOUT ANGINA PECTORIS: Primary | ICD-10-CM

## 2024-01-23 DIAGNOSIS — E78.5 HYPERLIPIDEMIA LDL GOAL <160: ICD-10-CM

## 2024-01-23 DIAGNOSIS — M1A.9XX0 CHRONIC GOUT WITHOUT TOPHUS, UNSPECIFIED CAUSE, UNSPECIFIED SITE: ICD-10-CM

## 2024-01-23 DIAGNOSIS — M48.10 DISH (DIFFUSE IDIOPATHIC SKELETAL HYPEROSTOSIS): ICD-10-CM

## 2024-01-23 PROCEDURE — 80061 LIPID PANEL: CPT | Performed by: FAMILY MEDICINE

## 2024-01-23 PROCEDURE — 80048 BASIC METABOLIC PNL TOTAL CA: CPT | Performed by: FAMILY MEDICINE

## 2024-01-23 PROCEDURE — 84550 ASSAY OF BLOOD/URIC ACID: CPT | Performed by: FAMILY MEDICINE

## 2024-01-23 PROCEDURE — 90472 IMMUNIZATION ADMIN EACH ADD: CPT | Performed by: FAMILY MEDICINE

## 2024-01-23 PROCEDURE — 90715 TDAP VACCINE 7 YRS/> IM: CPT | Performed by: FAMILY MEDICINE

## 2024-01-23 PROCEDURE — 90471 IMMUNIZATION ADMIN: CPT | Performed by: FAMILY MEDICINE

## 2024-01-23 PROCEDURE — 99214 OFFICE O/P EST MOD 30 MIN: CPT | Mod: 25 | Performed by: FAMILY MEDICINE

## 2024-01-23 PROCEDURE — 90686 IIV4 VACC NO PRSV 0.5 ML IM: CPT | Performed by: FAMILY MEDICINE

## 2024-01-23 PROCEDURE — 36415 COLL VENOUS BLD VENIPUNCTURE: CPT | Performed by: FAMILY MEDICINE

## 2024-01-23 PROCEDURE — 90678 RSV VACC PREF BIVALENT IM: CPT | Performed by: FAMILY MEDICINE

## 2024-01-23 RX ORDER — METOPROLOL SUCCINATE 50 MG/1
50 TABLET, EXTENDED RELEASE ORAL 2 TIMES DAILY
Qty: 180 TABLET | Refills: 0 | Status: SHIPPED | OUTPATIENT
Start: 2024-01-23 | End: 2024-09-21

## 2024-01-23 RX ORDER — LISINOPRIL 10 MG/1
10 TABLET ORAL DAILY
Qty: 90 TABLET | Refills: 3 | Status: SHIPPED | OUTPATIENT
Start: 2024-01-23

## 2024-01-23 RX ORDER — SIMVASTATIN 40 MG
40 TABLET ORAL AT BEDTIME
Qty: 90 TABLET | Refills: 3 | Status: SHIPPED | OUTPATIENT
Start: 2024-01-23

## 2024-01-23 RX ORDER — ALLOPURINOL 300 MG/1
300 TABLET ORAL DAILY
Qty: 90 TABLET | Refills: 3 | Status: SHIPPED | OUTPATIENT
Start: 2024-01-23

## 2024-01-23 NOTE — PROGRESS NOTES
Assessment & Plan   Problem List Items Addressed This Visit       DISH (diffuse idiopathic skeletal hyperostosis)     Back pain has improved, pt has been running and golfing regularly.         Benign essential hypertension     Controlled with current medications, continue on lisinopril and metoprolol.          Relevant Medications    metoprolol succinate ER (TOPROL XL) 50 MG 24 hr tablet    lisinopril (ZESTRIL) 10 MG tablet    Other Relevant Orders    BASIC METABOLIC PANEL    Coronary artery disease involving native coronary artery of native heart without angina pectoris - Primary     Denies any symptoms, continue on ASA, metoprolol, simvastatin and lisinopril.           Relevant Orders    Lipid panel reflex to direct LDL Non-fasting    Chronic gout without tophus, unspecified cause, unspecified site     Last attack was more than 2 years ago, continue on current allopurinol dose, check uric acid.         Relevant Medications    allopurinol (ZYLOPRIM) 300 MG tablet    Other Relevant Orders    Uric acid     Other Visit Diagnoses       Hyperlipidemia LDL goal <160        Relevant Medications    simvastatin (ZOCOR) 40 MG tablet                    BMI  Estimated body mass index is 25.25 kg/m  as calculated from the following:    Height as of this encounter: 1.829 m (6').    Weight as of this encounter: 84.5 kg (186 lb 3.2 oz).             Mary Brown is a 63 year old, presenting for the following health issues:  Lipids, Hypertension, and Recheck Medication (Gout med (allopurinol))      1/23/2024     3:46 PM   Additional Questions   Roomed by Radha ONOFRE     History of Present Illness       Hyperlipidemia:  He presents for follow up of hyperlipidemia.   He is taking medication to lower cholesterol. He is not having myalgia or other side effects to statin medications.    Hypertension: He presents for follow up of hypertension.  He does not check blood pressure  regularly outside of the clinic. Outpatient blood  pressures have not been over 140/90. He does not follow a low salt diet.     He eats 2-3 servings of fruits and vegetables daily.He consumes 1 sweetened beverage(s) daily.He exercises with enough effort to increase his heart rate 20 to 29 minutes per day.  He exercises with enough effort to increase his heart rate 7 days per week.   He is taking medications regularly.         Hyperlipidemia Follow-Up    Are you regularly taking any medication or supplement to lower your cholesterol?   Yes- simvastatin  Are you having muscle aches or other side effects that you think could be caused by your cholesterol lowering medication?  No    Hypertension Follow-up    Do you check your blood pressure regularly outside of the clinic? No   Are you following a low salt diet? No  Are your blood pressures ever more than 140 on the top number (systolic) OR more   than 90 on the bottom number (diastolic), for example 140/90? No  Vascular Disease Follow-up    How often do you take nitroglycerin? Never  Do you take an aspirin every day? Yes    Gout: no episodes in the last year.        Objective    /81 (BP Location: Left arm, Patient Position: Chair, Cuff Size: Adult Regular)   Pulse 95   Temp 98.1  F (36.7  C) (Oral)   Resp 12   Ht 1.829 m (6')   Wt 84.5 kg (186 lb 3.2 oz)   SpO2 97%   BMI 25.25 kg/m    Body mass index is 25.25 kg/m .  Physical Exam   GENERAL: alert and no distress  RESP: lungs clear to auscultation - no rales, rhonchi or wheezes  CV: regular rate and rhythm, normal S1 S2, no S3 or S4, no murmur, click or rub, no peripheral edema  ABDOMEN: soft, nontender, no hepatosplenomegaly, no masses and bowel sounds normal  MS: no gross musculoskeletal defects noted, no edema            Signed Electronically by: Jody Blackburn MD

## 2024-01-24 LAB
ANION GAP SERPL CALCULATED.3IONS-SCNC: 10 MMOL/L (ref 7–15)
BUN SERPL-MCNC: 18.3 MG/DL (ref 8–23)
CALCIUM SERPL-MCNC: 9.3 MG/DL (ref 8.8–10.2)
CHLORIDE SERPL-SCNC: 103 MMOL/L (ref 98–107)
CHOLEST SERPL-MCNC: 122 MG/DL
CREAT SERPL-MCNC: 1.3 MG/DL (ref 0.67–1.17)
DEPRECATED HCO3 PLAS-SCNC: 27 MMOL/L (ref 22–29)
EGFRCR SERPLBLD CKD-EPI 2021: 62 ML/MIN/1.73M2
FASTING STATUS PATIENT QL REPORTED: NO
GLUCOSE SERPL-MCNC: 94 MG/DL (ref 70–99)
HDLC SERPL-MCNC: 30 MG/DL
LDLC SERPL CALC-MCNC: 41 MG/DL
NONHDLC SERPL-MCNC: 92 MG/DL
POTASSIUM SERPL-SCNC: 4.5 MMOL/L (ref 3.4–5.3)
SODIUM SERPL-SCNC: 140 MMOL/L (ref 135–145)
TRIGL SERPL-MCNC: 256 MG/DL
URATE SERPL-MCNC: 4.8 MG/DL (ref 3.4–7)

## 2024-01-25 ENCOUNTER — TELEPHONE (OUTPATIENT)
Dept: FAMILY MEDICINE | Facility: CLINIC | Age: 64
End: 2024-01-25
Payer: COMMERCIAL

## 2024-01-25 DIAGNOSIS — I10 BENIGN ESSENTIAL HYPERTENSION: Primary | ICD-10-CM

## 2024-01-25 NOTE — TELEPHONE ENCOUNTER
RN called and spoke with patient.  Relayed provider message. Patient was given an opportunity to ask questions, verbalized understanding of plan, and is agreeable.      Scheduled for 2/5/24 at 4:30pm at [facility].     Adali JACOB RN on 1/25/2024 at 12:18 PM

## 2024-02-05 ENCOUNTER — LAB (OUTPATIENT)
Dept: LAB | Facility: CLINIC | Age: 64
End: 2024-02-05
Payer: COMMERCIAL

## 2024-02-05 DIAGNOSIS — I10 BENIGN ESSENTIAL HYPERTENSION: ICD-10-CM

## 2024-02-05 PROCEDURE — 80048 BASIC METABOLIC PNL TOTAL CA: CPT

## 2024-02-05 PROCEDURE — 36415 COLL VENOUS BLD VENIPUNCTURE: CPT

## 2024-02-06 LAB
ANION GAP SERPL CALCULATED.3IONS-SCNC: 9 MMOL/L (ref 7–15)
BUN SERPL-MCNC: 14.8 MG/DL (ref 8–23)
CALCIUM SERPL-MCNC: 9.2 MG/DL (ref 8.8–10.2)
CHLORIDE SERPL-SCNC: 100 MMOL/L (ref 98–107)
CREAT SERPL-MCNC: 0.9 MG/DL (ref 0.67–1.17)
DEPRECATED HCO3 PLAS-SCNC: 28 MMOL/L (ref 22–29)
EGFRCR SERPLBLD CKD-EPI 2021: >90 ML/MIN/1.73M2
GLUCOSE SERPL-MCNC: 91 MG/DL (ref 70–99)
POTASSIUM SERPL-SCNC: 4.2 MMOL/L (ref 3.4–5.3)
SODIUM SERPL-SCNC: 137 MMOL/L (ref 135–145)

## 2024-09-21 ENCOUNTER — MYC REFILL (OUTPATIENT)
Dept: FAMILY MEDICINE | Facility: CLINIC | Age: 64
End: 2024-09-21
Payer: COMMERCIAL

## 2024-09-21 DIAGNOSIS — I10 BENIGN ESSENTIAL HYPERTENSION: ICD-10-CM

## 2024-09-23 RX ORDER — METOPROLOL SUCCINATE 50 MG/1
50 TABLET, EXTENDED RELEASE ORAL 2 TIMES DAILY
Qty: 180 TABLET | Refills: 0 | OUTPATIENT
Start: 2024-09-23

## 2024-09-23 RX ORDER — METOPROLOL SUCCINATE 50 MG/1
50 TABLET, EXTENDED RELEASE ORAL 2 TIMES DAILY
Qty: 180 TABLET | Refills: 0 | Status: SHIPPED | OUTPATIENT
Start: 2024-09-23

## 2024-12-18 DIAGNOSIS — I10 BENIGN ESSENTIAL HYPERTENSION: ICD-10-CM

## 2024-12-18 RX ORDER — METOPROLOL SUCCINATE 50 MG/1
50 TABLET, EXTENDED RELEASE ORAL 2 TIMES DAILY
Qty: 180 TABLET | Refills: 0 | Status: SHIPPED | OUTPATIENT
Start: 2024-12-18

## 2024-12-18 NOTE — TELEPHONE ENCOUNTER
Dr. Blackburn   Metoprolol was ordered to take 1 tablet BID by you on 9/23/2024   Please approve pended medication or change to the dose that you would like patient to take     Thank you   Taryn Dukes, Registered Nurse  [facility]

## 2024-12-18 NOTE — TELEPHONE ENCOUNTER
I wonder why does the patient takes it every day.  Also can we please schedule him for a follow up physical in 2 months or so.

## 2025-01-11 ENCOUNTER — HEALTH MAINTENANCE LETTER (OUTPATIENT)
Age: 65
End: 2025-01-11

## 2025-01-18 DIAGNOSIS — M1A.9XX0 CHRONIC GOUT WITHOUT TOPHUS, UNSPECIFIED CAUSE, UNSPECIFIED SITE: ICD-10-CM

## 2025-01-20 RX ORDER — ALLOPURINOL 300 MG/1
300 TABLET ORAL DAILY
Qty: 90 TABLET | Refills: 0 | Status: SHIPPED | OUTPATIENT
Start: 2025-01-20

## 2025-01-20 NOTE — TELEPHONE ENCOUNTER
Please call, pt needs to be seen.  Jody Blackburn MD  [facility]  [phone]

## 2025-01-20 NOTE — TELEPHONE ENCOUNTER
LVM for patient to call the clinic at [phone] Medication has been refilled but due for a visit.  Helen Bravo, TC

## 2025-02-11 DIAGNOSIS — I10 BENIGN ESSENTIAL HYPERTENSION: ICD-10-CM

## 2025-02-11 RX ORDER — LISINOPRIL 10 MG/1
10 TABLET ORAL DAILY
Qty: 90 TABLET | Refills: 3 | OUTPATIENT
Start: 2025-02-11

## 2025-02-26 ENCOUNTER — TELEPHONE (OUTPATIENT)
Dept: FAMILY MEDICINE | Facility: CLINIC | Age: 65
End: 2025-02-26
Payer: COMMERCIAL

## 2025-02-26 NOTE — TELEPHONE ENCOUNTER
Incoming call from patient. Wondering about refill on Allopurinol. I let him know that a year worth of supply was sent in January to [facility] in [place]. He will call pharmacy to organize .    Jonna Holguin RN

## 2025-03-24 SDOH — HEALTH STABILITY: PHYSICAL HEALTH: ON AVERAGE, HOW MANY MINUTES DO YOU ENGAGE IN EXERCISE AT THIS LEVEL?: 40 MIN

## 2025-03-24 SDOH — HEALTH STABILITY: PHYSICAL HEALTH: ON AVERAGE, HOW MANY DAYS PER WEEK DO YOU ENGAGE IN MODERATE TO STRENUOUS EXERCISE (LIKE A BRISK WALK)?: 2 DAYS

## 2025-03-24 ASSESSMENT — SOCIAL DETERMINANTS OF HEALTH (SDOH): HOW OFTEN DO YOU GET TOGETHER WITH FRIENDS OR RELATIVES?: ONCE A WEEK

## 2025-03-25 ENCOUNTER — E-CONSULT (OUTPATIENT)
Dept: CARDIOLOGY | Facility: CLINIC | Age: 65
End: 2025-03-25

## 2025-03-25 ENCOUNTER — TELEPHONE (OUTPATIENT)
Dept: FAMILY MEDICINE | Facility: CLINIC | Age: 65
End: 2025-03-25

## 2025-03-25 ENCOUNTER — OFFICE VISIT (OUTPATIENT)
Dept: FAMILY MEDICINE | Facility: CLINIC | Age: 65
End: 2025-03-25
Payer: COMMERCIAL

## 2025-03-25 VITALS
RESPIRATION RATE: 12 BRPM | HEIGHT: 72 IN | DIASTOLIC BLOOD PRESSURE: 78 MMHG | BODY MASS INDEX: 24 KG/M2 | HEART RATE: 76 BPM | SYSTOLIC BLOOD PRESSURE: 127 MMHG | WEIGHT: 177.2 LBS | TEMPERATURE: 98.2 F | OXYGEN SATURATION: 99 %

## 2025-03-25 DIAGNOSIS — M1A.9XX0 CHRONIC GOUT WITHOUT TOPHUS, UNSPECIFIED CAUSE, UNSPECIFIED SITE: ICD-10-CM

## 2025-03-25 DIAGNOSIS — M48.10 DISH (DIFFUSE IDIOPATHIC SKELETAL HYPEROSTOSIS): ICD-10-CM

## 2025-03-25 DIAGNOSIS — Z00.00 ROUTINE GENERAL MEDICAL EXAMINATION AT A HEALTH CARE FACILITY: Primary | ICD-10-CM

## 2025-03-25 DIAGNOSIS — I25.10 CORONARY ARTERY DISEASE INVOLVING NATIVE CORONARY ARTERY OF NATIVE HEART WITHOUT ANGINA PECTORIS: ICD-10-CM

## 2025-03-25 DIAGNOSIS — Z12.5 SCREENING FOR PROSTATE CANCER: ICD-10-CM

## 2025-03-25 DIAGNOSIS — I10 PRIMARY HYPERTENSION: ICD-10-CM

## 2025-03-25 DIAGNOSIS — D12.3 BENIGN NEOPLASM OF TRANSVERSE COLON: ICD-10-CM

## 2025-03-25 DIAGNOSIS — I25.10 CORONARY ARTERY DISEASE INVOLVING NATIVE CORONARY ARTERY OF NATIVE HEART WITHOUT ANGINA PECTORIS: Primary | ICD-10-CM

## 2025-03-25 PROBLEM — Z95.5 STATUS POST INSERTION OF DRUG ELUTING CORONARY ARTERY STENT: Status: RESOLVED | Noted: 2017-10-10 | Resolved: 2025-03-25

## 2025-03-25 PROBLEM — D12.2 BENIGN NEOPLASM OF ASCENDING COLON: Status: RESOLVED | Noted: 2021-04-07 | Resolved: 2025-03-25

## 2025-03-25 LAB
ANION GAP SERPL CALCULATED.3IONS-SCNC: 11 MMOL/L (ref 7–15)
BUN SERPL-MCNC: 17 MG/DL (ref 8–23)
CALCIUM SERPL-MCNC: 9.7 MG/DL (ref 8.8–10.4)
CHLORIDE SERPL-SCNC: 100 MMOL/L (ref 98–107)
CHOLEST SERPL-MCNC: 114 MG/DL
CREAT SERPL-MCNC: 0.94 MG/DL (ref 0.67–1.17)
EGFRCR SERPLBLD CKD-EPI 2021: >90 ML/MIN/1.73M2
FASTING STATUS PATIENT QL REPORTED: YES
FASTING STATUS PATIENT QL REPORTED: YES
GLUCOSE SERPL-MCNC: 101 MG/DL (ref 70–99)
HCO3 SERPL-SCNC: 28 MMOL/L (ref 22–29)
HDLC SERPL-MCNC: 34 MG/DL
LDLC SERPL CALC-MCNC: 50 MG/DL
NONHDLC SERPL-MCNC: 80 MG/DL
POTASSIUM SERPL-SCNC: 4.4 MMOL/L (ref 3.4–5.3)
PSA SERPL DL<=0.01 NG/ML-MCNC: 2.49 NG/ML (ref 0–4.5)
SODIUM SERPL-SCNC: 139 MMOL/L (ref 135–145)
TRIGL SERPL-MCNC: 150 MG/DL
URATE SERPL-MCNC: 4.5 MG/DL (ref 3.4–7)

## 2025-03-25 PROCEDURE — 3078F DIAST BP <80 MM HG: CPT | Performed by: FAMILY MEDICINE

## 2025-03-25 PROCEDURE — 80048 BASIC METABOLIC PNL TOTAL CA: CPT | Performed by: FAMILY MEDICINE

## 2025-03-25 PROCEDURE — G0103 PSA SCREENING: HCPCS | Performed by: FAMILY MEDICINE

## 2025-03-25 PROCEDURE — 99214 OFFICE O/P EST MOD 30 MIN: CPT | Mod: 25 | Performed by: FAMILY MEDICINE

## 2025-03-25 PROCEDURE — G2211 COMPLEX E/M VISIT ADD ON: HCPCS | Performed by: FAMILY MEDICINE

## 2025-03-25 PROCEDURE — 90471 IMMUNIZATION ADMIN: CPT | Performed by: FAMILY MEDICINE

## 2025-03-25 PROCEDURE — 99207 E-CONSULT TO CARDIOLOGY (ADULT OUTPT PROVIDER TO SPECIALIST WRITTEN QUESTION & RESPONSE): CPT | Performed by: FAMILY MEDICINE

## 2025-03-25 PROCEDURE — 3074F SYST BP LT 130 MM HG: CPT | Performed by: FAMILY MEDICINE

## 2025-03-25 PROCEDURE — 90677 PCV20 VACCINE IM: CPT | Performed by: FAMILY MEDICINE

## 2025-03-25 PROCEDURE — 36415 COLL VENOUS BLD VENIPUNCTURE: CPT | Performed by: FAMILY MEDICINE

## 2025-03-25 PROCEDURE — 80061 LIPID PANEL: CPT | Performed by: FAMILY MEDICINE

## 2025-03-25 PROCEDURE — 99396 PREV VISIT EST AGE 40-64: CPT | Mod: 25 | Performed by: FAMILY MEDICINE

## 2025-03-25 PROCEDURE — 84550 ASSAY OF BLOOD/URIC ACID: CPT | Performed by: FAMILY MEDICINE

## 2025-03-25 NOTE — PROGRESS NOTES
3/25/2025     E-Consult has been accepted.    Interprofessional consultation requested by:  Jody Blackburn MD      Clinical Question/Purpose: MY CLINICAL QUESTION IS: pt has hx of CAD, s/p stent placement of the DIagonal in 2017. Pt has been asymptomatic, I wonder, is it ok to stop Metoprolol at this time if his blood pressure is controlled without it?    Patient assessment and information reviewed:     Recommendations: I would recommend reassessing left ventricular systolic function with an echocardiogram given that it has been several years since this has been performed.  If LV function is within normal limits and he has no evidence of angina with normal blood pressures it would be appropriate to taper him off metoprolol.  I would reduce his dose to 50 mg daily for 2 weeks, then 25 mg daily for a week before discontinuation.    I would also recommend he be seen in cardiology follow-up given his history of coronary artery disease.  Thank you.      The recommendations provided in this E-Consult are based on a review of clinical data pertinent to the clinical question presented, without a review of the patient's complete medical record or, the benefit of a comprehensive in-person or virtual patient evaluation. This consultation should not replace the clinical judgement and evaluation of the provider ordering this E-Consult. Any new clinical issues, or changes in patient status since the filing of this E-Consult will need to be taken into account when assessing these recommendations. Please contact me if you have further questions.    My total time spent reviewing clinical information and formulating assessment was 10 minutes.        Isabel Guerra MD

## 2025-03-25 NOTE — TELEPHONE ENCOUNTER
Cardiology do recommend Echo of the heart, if that's normal, then we can cut down on metoprolol to 50 mg daily for 2 weeks, then 25 mg daily (he can take half a pill) for 2 weeks then stop.

## 2025-03-25 NOTE — ASSESSMENT & PLAN NOTE
Denies any symptoms, continue on ASA, metoprolol 50 mg daily, simvastatin 40 mg and lisinopril 10 mg.  Will consuult with cardiology E visit about stopping metoprolol.     Last seen: 10/19  RTC: 8 weeks   Cancel: none   No-show: 12/21  Next appt: 2/8    Incoming refill from patient via phone      Disp Refills Start End AARTI   methylphenidate (RITALIN) 20 MG tablet 90 tablet 0 10/19/2021  No   Sig: Take 2 tabs in am and one tab at noon.   Sent to pharmacy as: Methylphenidate HCl 20 MG Oral Tablet (RITALIN)   Class: E-Prescribe   Earliest Fill Date: 10/19/2021   Notes to Pharmacy: Do not fill until Nov 20, 2021   Order: [id number]   E-Prescribing Status: Receipt confirmed by pharmacy (10/19/2021  2:06 PM CDT)   Per  last refilled: 12/22 #90, 11/2 #90, 9/24 #90     Disp Refills Start End AARTI   DULoxetine (CYMBALTA) 60 MG capsule 60 capsule 3 9/27/2021  No   Sig - Route: Take 2 capsules (120 mg) by mouth daily - Oral   Sent to pharmacy as: DULoxetine HCl 60 MG Oral Capsule Delayed Release Particles (CYMBALTA)   Class: E-Prescribe   Order: [id number]   E-Prescribing Status: Receipt confirmed by pharmacy (9/27/2021 11:05 PM CDT     Will route to provider for approval

## 2025-03-25 NOTE — ASSESSMENT & PLAN NOTE
Last attack was more than 6 years ago, continue on current allopurinol 300 mg dose, check uric acid.

## 2025-03-25 NOTE — PATIENT INSTRUCTIONS
Patient Education   Preventive Care Advice   This is general advice given by our system to help you stay healthy. However, your care team may have specific advice just for you. Please talk to your care team about your preventive care needs.  Nutrition  Eat 5 or more servings of fruits and vegetables each day.  Try wheat bread, brown rice and whole grain pasta (instead of white bread, rice, and pasta).  Get enough calcium and vitamin D. Check the label on foods and aim for 100% of the RDA (recommended daily allowance).  Lifestyle  Exercise at least 150 minutes each week  (30 minutes a day, 5 days a week).  Do muscle strengthening activities 2 days a week. These help control your weight and prevent disease.  No smoking.  Wear sunscreen to prevent skin cancer.  Have a dental exam and cleaning every 6 months.  Yearly exams  See your health care team every year to talk about:  Any changes in your health.  Any medicines your care team has prescribed.  Preventive care, family planning, and ways to prevent chronic diseases.  Shots (vaccines)   HPV shots (up to age 26), if you've never had them before.  Hepatitis B shots (up to age 59), if you've never had them before.  COVID-19 shot: Get this shot when it's due.  Flu shot: Get a flu shot every year.  Tetanus shot: Get a tetanus shot every 10 years.  Pneumococcal, hepatitis A, and RSV shots: Ask your care team if you need these based on your risk.  Shingles shot (for age 50 and up)  General health tests  Diabetes screening:  Starting at age 35, Get screened for diabetes at least every 3 years.  If you are younger than age 35, ask your care team if you should be screened for diabetes.  Cholesterol test: At age 39, start having a cholesterol test every 5 years, or more often if advised.  Bone density scan (DEXA): At age 50, ask your care team if you should have this scan for osteoporosis (brittle bones).  Hepatitis C: Get tested at least once in your life.  STIs (sexually  transmitted infections)  Before age 24: Ask your care team if you should be screened for STIs.  After age 24: Get screened for STIs if you're at risk. You are at risk for STIs (including HIV) if:  You are sexually active with more than one person.  You don't use condoms every time.  You or a partner was diagnosed with a sexually transmitted infection.  If you are at risk for HIV, ask about PrEP medicine to prevent HIV.  Get tested for HIV at least once in your life, whether you are at risk for HIV or not.  Cancer screening tests  Cervical cancer screening: If you have a cervix, begin getting regular cervical cancer screening tests starting at age 21.  Breast cancer scan (mammogram): If you've ever had breasts, begin having regular mammograms starting at age 40. This is a scan to check for breast cancer.  Colon cancer screening: It is important to start screening for colon cancer at age 45.  Have a colonoscopy test every 10 years (or more often if you're at risk) Or, ask your provider about stool tests like a FIT test every year or Cologuard test every 3 years.  To learn more about your testing options, visit:   .  For help making a decision, visit:   https://bit.ly/xr91578.  Prostate cancer screening test: If you have a prostate, ask your care team if a prostate cancer screening test (PSA) at age 55 is right for you.  Lung cancer screening: If you are a current or former smoker ages 50 to 80, ask your care team if ongoing lung cancer screenings are right for you.  For informational purposes only. Not to replace the advice of your health care provider. Copyright   2023 [employer]. All rights reserved. Clinically reviewed by the [facility]. [employer] 260420 - REV 01/24.

## 2025-03-25 NOTE — PROGRESS NOTES
Preventive Care Visit  [facility]  Jody Blackburn MD, Family Medicine  Mar 25, 2025      Assessment & Plan     Routine general medical examination at a health care facility:  - General health appears stable with no significant concerns.  - Schedule follow-up appointment in one year. Conduct routine labs including cholesterol, basic profile, uric acid, and prostate test.    Coronary artery disease involving native coronary artery of native heart without angina pectoris:  - Stable post-stent placement in 2017. Blood pressure well-controlled.  - Conduct an e-consult with a cardiologist to evaluate the possibility of discontinuing metoprolol. If approved, reduce metoprolol to 50 mg for two weeks, then discontinue. Continue lisinopril and simvastatin.  - Prescription: metoprolol 50 mg, lisinopril, simvastatin.    Primary hypertension:  - Blood pressure well-controlled with current medication regimen.  - Continue current medications: lisinopril and metoprolol. Adjust metoprolol dosage based on cardiologist's recommendation.  - Prescription: lisinopril, metoprolol.    DISH (diffuse idiopathic skeletal hyperostosis):  - Occasional back ache, particularly with activities like golfing.  - Consider physical therapy to strengthen back and improve flexibility. Use topical diclofenac gel (Voltaren) as needed for pain relief.  - Prescription: diclofenac gel (Voltaren).    Benign neoplasm of transverse colon:  - History of polyps with recommendation for colonoscopy every three years.  - Schedule colonoscopy as due. Maintain current surveillance interval.    Chronic gout without tophus, unspecified cause, unspecified site:  - Gout well-controlled with allopurinol. No recent flare-ups.  - Continue allopurinol. Monitor kidney function regularly.  - Prescription: allopurinol.           Mary Brown is a 64 year old, presenting for the following:  Physical (Fasting)        3/25/2025     8:08 AM   Additional  Questions   Roomed by Mallory   Accompanied by Self          HPI  History of Present Illness-Kevin Simeon is a 64-year-old male who reports urinating twice a night, which he considers a minor concern related to aging. He is not interested in medication but is curious about supplements. The doctor advises lifestyle changes such as urinating before bed and avoiding certain drinks after 2 PM. He had a stent placed in 2017 and has been doing well since. He is currently taking aspirin, simvastatin 40 mg, lisinopril, and metoprolol. The doctor discusses the possibility of reducing or discontinuing metoprolol after consulting with a cardiologist. Kevin describes himself as very active at work and walks on weekends. He works as an  and is involved in physically demanding projects. He reports an improved diet with more fruits and vegetables and smaller meals and has reduced beer consumption to a couple of times a week. He has a history of gout, managed with allopurinol, and has been gout-free for a long time. He is concerned about the potential for flare-ups if he stops taking allopurinol. Kevin has experienced digestive issues with ibuprofen, leading to its discontinuation. He previously took prednisone for back pain and has a history of chiropractic treatment for back pain, which provided some relief. He is considering physical therapy. He received a pneumonia shot previously and is due for a colonoscopy every 3 years due to previous findings of larger polyps.           Advance Care Planning  Patient does not have a Health Care Directive:       3/24/2025   General Health   How would you rate your overall physical health? Good   Feel stress (tense, anxious, or unable to sleep) Not at all         3/24/2025   Nutrition   Three or more servings of calcium each day? Yes   Diet: Regular (no restrictions)   How many servings of fruit and vegetables per day? (!) 2-3   How many sweetened beverages each day? 0-1         3/24/2025    Exercise   Days per week of moderate/strenous exercise 2 days   Average minutes spent exercising at this level 40 min   (!) EXERCISE CONCERN      3/24/2025   Social Factors   Frequency of gathering with friends or relatives Once a week   Worry food won't last until get money to buy more No   Food not last or not have enough money for food? No   Do you have housing? (Housing is defined as stable permanent housing and does not include staying ouside in a car, in a tent, in an abandoned building, in an overnight shelter, or couch-surfing.) Yes   Are you worried about losing your housing? No   Lack of transportation? No   Unable to get utilities (heat,electricity)? No         3/24/2025   Fall Risk   Fallen 2 or more times in the past year? No   Trouble with walking or balance? No          3/24/2025   Dental   Dentist two times every year? Yes                 3/24/2025   Substance Use   Alcohol more than 3/day or more than 7/wk No   Do you use any other substances recreationally? No     Social History     Tobacco Use    Smoking status: Never    Smokeless tobacco: Never   Vaping Use    Vaping status: Never Used   Substance Use Topics    Alcohol use: Yes     Alcohol/week: 0.0 standard drinks of alcohol     Comment: 15 beers per week    Drug use: No           3/24/2025   STI Screening   New sexual partner(s) since last STI/HIV test? No   Last PSA:   PSA   Date Value Ref Range Status   08/27/2020 1.86 0 - 4 ug/L Final     Comment:     Assay Method:  Chemiluminescence using Siemens Vista analyzer     ASCVD Risk   The ASCVD Risk score (Sayda LITTLE, et al., 2019) failed to calculate for the following reasons:    The valid total cholesterol range is 130 to 320 mg/dL           Reviewed and updated as needed this visit by Provider                    Past Medical History:   Diagnosis Date    Benign neoplasm of ascending colon 04/07/2021    Benign neoplasm of transverse colon 04/07/2021    Campylobacter diarrhea 03/19/2012     Coronary artery disease involving native coronary artery of native heart without angina pectoris 09/29/2017    DISH (diffuse idiopathic skeletal hyperostosis) 09/02/2016    Hyperlipidemia LDL goal <130 04/25/2017    Hypertension     Inflammatory arthritis 03/14/2012    Joint swelling 09/12/2013    Seborrheic keratosis 09/02/2016    Status post insertion of drug eluting coronary artery stent 10/10/2017    Diagonal vessel SHANNON.       Past Surgical History:   Procedure Laterality Date    COLONOSCOPY      HC LEFT HEART CATHETERIZATION  10/10/2017    percutaneous coronary intervention first diagonal branch of LAD: 32 x 2.25 mm length everolimus eluting Promus premiere stent         Review of Systems  Constitutional, HEENT, cardiovascular, pulmonary, GI, , musculoskeletal, neuro, skin, endocrine and psych systems are negative, except as otherwise noted.     Objective    Exam  /78 (BP Location: Left arm, Patient Position: Sitting, Cuff Size: Adult Large)   Pulse 76   Temp 98.2  F (36.8  C) (Oral)   Resp 12   Ht 1.829 m (6')   Wt 80.4 kg (177 lb 3.2 oz)   SpO2 99%   BMI 24.03 kg/m     Estimated body mass index is 24.03 kg/m  as calculated from the following:    Height as of this encounter: 1.829 m (6').    Weight as of this encounter: 80.4 kg (177 lb 3.2 oz).    Physical Exam  GENERAL: alert and no distress  EYES: Eyes grossly normal to inspection, PERRL and conjunctivae and sclerae normal  HENT: ear canals and TM's normal, nose and mouth without ulcers or lesions  NECK: no adenopathy, no asymmetry, masses, or scars  RESP: lungs clear to auscultation - no rales, rhonchi or wheezes  CV: regular rate and rhythm, normal S1 S2, no S3 or S4, no murmur, click or rub, no peripheral edema  ABDOMEN: soft, nontender, no hepatosplenomegaly, no masses and bowel sounds normal  MS: no gross musculoskeletal defects noted, no edema  SKIN: no suspicious lesions or rashes  NEURO: Normal strength and tone, mentation intact  and speech normal  PSYCH: mentation appears normal, affect normal/bright        Signed Electronically by: Jody Blcakburn MD

## 2025-03-25 NOTE — ASSESSMENT & PLAN NOTE
Controlled with current medications, continue on lisinopril 10 mg daliy and metoprolol 100 mg daily.

## 2025-03-26 NOTE — TELEPHONE ENCOUNTER
Attempt x2 - Left message requesting a call back to [phone] and ask to speak to a nurse.     Aida MONTES DE OCA RN  [facility]

## 2025-03-26 NOTE — TELEPHONE ENCOUNTER
Incoming call from patient     Reviewed message below from Dr. Blackburn     Patient has scheduled his Echo 4/1/2025    RN advised after we receive the results of the Echo, he will be notified with recommendations which may include decreasing medication as mentioned in message below from Dr Blackburn     Patient states understanding of this plan     Taryn Dukes, Registered Nurse  [facility]

## 2025-04-10 ENCOUNTER — HOSPITAL ENCOUNTER (OUTPATIENT)
Dept: CARDIOLOGY | Facility: CLINIC | Age: 65
Discharge: HOME | End: 2025-04-10
Attending: FAMILY MEDICINE
Payer: COMMERCIAL

## 2025-04-10 DIAGNOSIS — I25.10 CORONARY ARTERY DISEASE INVOLVING NATIVE CORONARY ARTERY OF NATIVE HEART WITHOUT ANGINA PECTORIS: ICD-10-CM

## 2025-04-10 LAB — LVEF ECHO: NORMAL

## 2025-04-10 PROCEDURE — 93306 TTE W/DOPPLER COMPLETE: CPT
